# Patient Record
Sex: FEMALE | Race: WHITE | NOT HISPANIC OR LATINO | Employment: OTHER | RURAL
[De-identification: names, ages, dates, MRNs, and addresses within clinical notes are randomized per-mention and may not be internally consistent; named-entity substitution may affect disease eponyms.]

---

## 2020-07-31 ENCOUNTER — HISTORICAL (OUTPATIENT)
Dept: ADMINISTRATIVE | Facility: HOSPITAL | Age: 75
End: 2020-07-31

## 2021-05-13 ENCOUNTER — HOSPITAL ENCOUNTER (OUTPATIENT)
Dept: RADIOLOGY | Facility: HOSPITAL | Age: 76
Discharge: HOME | End: 2021-05-13
Attending: ORTHOPAEDIC SURGERY
Payer: MEDICARE

## 2021-05-13 DIAGNOSIS — M25.561 ACUTE PAIN OF BOTH KNEES: ICD-10-CM

## 2021-05-13 DIAGNOSIS — M25.562 ACUTE PAIN OF BOTH KNEES: ICD-10-CM

## 2021-05-13 PROCEDURE — 73564 X-RAY EXAM KNEE 4 OR MORE: CPT | Mod: TC,50

## 2021-05-20 ENCOUNTER — CLINICAL SUPPORT (OUTPATIENT)
Dept: CARDIOLOGY | Facility: CLINIC | Age: 76
End: 2021-05-20
Payer: MEDICARE

## 2021-05-20 ENCOUNTER — HOSPITAL ENCOUNTER (OUTPATIENT)
Dept: RADIOLOGY | Facility: HOSPITAL | Age: 76
Discharge: HOME | End: 2021-05-20
Attending: ORTHOPAEDIC SURGERY
Payer: MEDICARE

## 2021-05-20 DIAGNOSIS — Z01.810 PRE-OPERATIVE CARDIOVASCULAR EXAMINATION: ICD-10-CM

## 2021-05-20 DIAGNOSIS — Z01.811 PRE-OPERATIVE RESPIRATORY EXAMINATION: ICD-10-CM

## 2021-05-20 PROCEDURE — 71046 X-RAY EXAM CHEST 2 VIEWS: CPT | Mod: 26,,, | Performed by: RADIOLOGY

## 2021-05-20 PROCEDURE — 71046 X-RAY EXAM CHEST 2 VIEWS: CPT | Mod: TC

## 2021-05-20 PROCEDURE — 71046 XR CHEST PA AND LATERAL: ICD-10-PCS | Mod: 26,,, | Performed by: RADIOLOGY

## 2021-05-20 PROCEDURE — 99212 OFFICE O/P EST SF 10 MIN: CPT | Mod: PBBFAC,25

## 2021-05-20 PROCEDURE — 93005 ELECTROCARDIOGRAM TRACING: CPT | Mod: PBBFAC | Performed by: STUDENT IN AN ORGANIZED HEALTH CARE EDUCATION/TRAINING PROGRAM

## 2021-05-20 PROCEDURE — 93010 EKG 12-LEAD: ICD-10-PCS | Mod: S$PBB,,, | Performed by: STUDENT IN AN ORGANIZED HEALTH CARE EDUCATION/TRAINING PROGRAM

## 2021-05-20 PROCEDURE — 93010 ELECTROCARDIOGRAM REPORT: CPT | Mod: S$PBB,,, | Performed by: STUDENT IN AN ORGANIZED HEALTH CARE EDUCATION/TRAINING PROGRAM

## 2021-05-24 ENCOUNTER — ANESTHESIA EVENT (OUTPATIENT)
Dept: SURGERY | Facility: HOSPITAL | Age: 76
End: 2021-05-24
Payer: MEDICARE

## 2021-05-24 ENCOUNTER — ANESTHESIA (OUTPATIENT)
Dept: SURGERY | Facility: HOSPITAL | Age: 76
End: 2021-05-24
Payer: MEDICARE

## 2021-05-24 ENCOUNTER — HOSPITAL ENCOUNTER (OUTPATIENT)
Facility: HOSPITAL | Age: 76
Discharge: HOME | End: 2021-05-24
Attending: ORTHOPAEDIC SURGERY | Admitting: ORTHOPAEDIC SURGERY
Payer: MEDICARE

## 2021-05-24 VITALS
WEIGHT: 188 LBS | HEART RATE: 75 BPM | HEIGHT: 65 IN | RESPIRATION RATE: 16 BRPM | OXYGEN SATURATION: 94 % | TEMPERATURE: 98 F | BODY MASS INDEX: 31.32 KG/M2 | SYSTOLIC BLOOD PRESSURE: 107 MMHG | DIASTOLIC BLOOD PRESSURE: 65 MMHG

## 2021-05-24 DIAGNOSIS — S83.249A ACUTE MEDIAL MENISCAL TEAR: Primary | ICD-10-CM

## 2021-05-24 PROCEDURE — D9220A PRA ANESTHESIA: Mod: ANES,,, | Performed by: ANESTHESIOLOGY

## 2021-05-24 PROCEDURE — 25000003 PHARM REV CODE 250: Performed by: ANESTHESIOLOGY

## 2021-05-24 PROCEDURE — 37000009 HC ANESTHESIA EA ADD 15 MINS: Performed by: ORTHOPAEDIC SURGERY

## 2021-05-24 PROCEDURE — 71000016 HC POSTOP RECOV ADDL HR: Performed by: ORTHOPAEDIC SURGERY

## 2021-05-24 PROCEDURE — 27000260 *HC AIRWAY ORAL: Performed by: ANESTHESIOLOGY

## 2021-05-24 PROCEDURE — 27201423 OPTIME MED/SURG SUP & DEVICES STERILE SUPPLY: Performed by: ORTHOPAEDIC SURGERY

## 2021-05-24 PROCEDURE — 97161 PT EVAL LOW COMPLEX 20 MIN: CPT

## 2021-05-24 PROCEDURE — 71000033 HC RECOVERY, INTIAL HOUR: Performed by: ORTHOPAEDIC SURGERY

## 2021-05-24 PROCEDURE — 71000015 HC POSTOP RECOV 1ST HR: Performed by: ORTHOPAEDIC SURGERY

## 2021-05-24 PROCEDURE — 27000177 HC AIRWAY, LARYNGEAL MASK: Performed by: ANESTHESIOLOGY

## 2021-05-24 PROCEDURE — 63600175 PHARM REV CODE 636 W HCPCS: Performed by: NURSE ANESTHETIST, CERTIFIED REGISTERED

## 2021-05-24 PROCEDURE — 37000008 HC ANESTHESIA 1ST 15 MINUTES: Performed by: ORTHOPAEDIC SURGERY

## 2021-05-24 PROCEDURE — 36000710: Performed by: ORTHOPAEDIC SURGERY

## 2021-05-24 PROCEDURE — 25000003 PHARM REV CODE 250: Performed by: NURSE ANESTHETIST, CERTIFIED REGISTERED

## 2021-05-24 PROCEDURE — 25000003 PHARM REV CODE 250: Performed by: ORTHOPAEDIC SURGERY

## 2021-05-24 PROCEDURE — 27000510 HC BLANKET BAIR HUGGER ANY SIZE: Performed by: ANESTHESIOLOGY

## 2021-05-24 PROCEDURE — 27000655: Performed by: ANESTHESIOLOGY

## 2021-05-24 PROCEDURE — D9220A PRA ANESTHESIA: ICD-10-PCS | Mod: CRNA,,, | Performed by: NURSE ANESTHETIST, CERTIFIED REGISTERED

## 2021-05-24 PROCEDURE — 27100168 OPTIME MED/SURG SUP & DEVICES NON-STERILE SUPPLY: Performed by: ORTHOPAEDIC SURGERY

## 2021-05-24 PROCEDURE — D9220A PRA ANESTHESIA: ICD-10-PCS | Mod: ANES,,, | Performed by: ANESTHESIOLOGY

## 2021-05-24 PROCEDURE — 36000711: Performed by: ORTHOPAEDIC SURGERY

## 2021-05-24 PROCEDURE — D9220A PRA ANESTHESIA: Mod: CRNA,,, | Performed by: NURSE ANESTHETIST, CERTIFIED REGISTERED

## 2021-05-24 PROCEDURE — 27000716 HC OXISENSOR PROBE, ANY SIZE: Performed by: ANESTHESIOLOGY

## 2021-05-24 RX ORDER — LIDOCAINE HYDROCHLORIDE 10 MG/ML
1 INJECTION, SOLUTION EPIDURAL; INFILTRATION; INTRACAUDAL; PERINEURAL ONCE
Status: CANCELLED | OUTPATIENT
Start: 2021-05-24 | End: 2021-05-24

## 2021-05-24 RX ORDER — ONDANSETRON 2 MG/ML
4 INJECTION INTRAMUSCULAR; INTRAVENOUS DAILY PRN
Status: DISCONTINUED | OUTPATIENT
Start: 2021-05-24 | End: 2021-05-24 | Stop reason: HOSPADM

## 2021-05-24 RX ORDER — PROPOFOL 10 MG/ML
VIAL (ML) INTRAVENOUS
Status: DISCONTINUED | OUTPATIENT
Start: 2021-05-24 | End: 2021-05-24

## 2021-05-24 RX ORDER — SODIUM CHLORIDE, SODIUM LACTATE, POTASSIUM CHLORIDE, CALCIUM CHLORIDE 600; 310; 30; 20 MG/100ML; MG/100ML; MG/100ML; MG/100ML
INJECTION, SOLUTION INTRAVENOUS CONTINUOUS
Status: CANCELLED | OUTPATIENT
Start: 2021-05-24

## 2021-05-24 RX ORDER — ONDANSETRON 2 MG/ML
INJECTION INTRAMUSCULAR; INTRAVENOUS
Status: DISCONTINUED | OUTPATIENT
Start: 2021-05-24 | End: 2021-05-24

## 2021-05-24 RX ORDER — MIDAZOLAM HYDROCHLORIDE 1 MG/ML
INJECTION INTRAMUSCULAR; INTRAVENOUS
Status: DISCONTINUED | OUTPATIENT
Start: 2021-05-24 | End: 2021-05-24

## 2021-05-24 RX ORDER — LIDOCAINE HYDROCHLORIDE 20 MG/ML
INJECTION INTRAVENOUS
Status: DISCONTINUED | OUTPATIENT
Start: 2021-05-24 | End: 2021-05-24

## 2021-05-24 RX ORDER — CLINDAMYCIN PHOSPHATE 900 MG/50ML
900 INJECTION, SOLUTION INTRAVENOUS
Status: DISCONTINUED | OUTPATIENT
Start: 2021-05-24 | End: 2021-05-24 | Stop reason: HOSPADM

## 2021-05-24 RX ORDER — EPHEDRINE SULFATE 50 MG/ML
INJECTION, SOLUTION INTRAVENOUS
Status: DISCONTINUED | OUTPATIENT
Start: 2021-05-24 | End: 2021-05-24

## 2021-05-24 RX ORDER — MORPHINE SULFATE 10 MG/ML
4 INJECTION INTRAMUSCULAR; INTRAVENOUS; SUBCUTANEOUS EVERY 5 MIN PRN
Status: DISCONTINUED | OUTPATIENT
Start: 2021-05-24 | End: 2021-05-24 | Stop reason: HOSPADM

## 2021-05-24 RX ORDER — PROPRANOLOL HYDROCHLORIDE 40 MG/1
40 TABLET ORAL DAILY
COMMUNITY
End: 2022-04-11 | Stop reason: DRUGHIGH

## 2021-05-24 RX ORDER — SODIUM CHLORIDE 9 MG/ML
INJECTION, SOLUTION INTRAVENOUS CONTINUOUS
Status: DISCONTINUED | OUTPATIENT
Start: 2021-05-24 | End: 2021-05-24 | Stop reason: HOSPADM

## 2021-05-24 RX ORDER — CLINDAMYCIN PHOSPHATE 900 MG/50ML
INJECTION, SOLUTION INTRAVENOUS
Status: DISCONTINUED | OUTPATIENT
Start: 2021-05-24 | End: 2021-05-24

## 2021-05-24 RX ORDER — FLUOXETINE HYDROCHLORIDE 20 MG/1
20 CAPSULE ORAL DAILY
COMMUNITY
End: 2023-10-10

## 2021-05-24 RX ORDER — FENTANYL CITRATE 50 UG/ML
INJECTION, SOLUTION INTRAMUSCULAR; INTRAVENOUS
Status: DISCONTINUED | OUTPATIENT
Start: 2021-05-24 | End: 2021-05-24

## 2021-05-24 RX ORDER — DEXAMETHASONE SODIUM PHOSPHATE 100 MG/10ML
INJECTION INTRAMUSCULAR; INTRAVENOUS
Status: DISCONTINUED | OUTPATIENT
Start: 2021-05-24 | End: 2021-05-24

## 2021-05-24 RX ORDER — OXYCODONE HYDROCHLORIDE 5 MG/1
5 TABLET ORAL
Status: DISCONTINUED | OUTPATIENT
Start: 2021-05-24 | End: 2021-05-24 | Stop reason: HOSPADM

## 2021-05-24 RX ORDER — HYDROCODONE BITARTRATE AND ACETAMINOPHEN 7.5; 325 MG/1; MG/1
1 TABLET ORAL EVERY 6 HOURS PRN
Status: COMPLETED | OUTPATIENT
Start: 2021-05-24 | End: 2021-05-24

## 2021-05-24 RX ORDER — TRIAMTERENE/HYDROCHLOROTHIAZID 37.5-25 MG
1 TABLET ORAL DAILY
COMMUNITY

## 2021-05-24 RX ORDER — HYDROMORPHONE HYDROCHLORIDE 2 MG/ML
0.5 INJECTION, SOLUTION INTRAMUSCULAR; INTRAVENOUS; SUBCUTANEOUS EVERY 5 MIN PRN
Status: DISCONTINUED | OUTPATIENT
Start: 2021-05-24 | End: 2021-05-24 | Stop reason: HOSPADM

## 2021-05-24 RX ORDER — DIPHENHYDRAMINE HYDROCHLORIDE 50 MG/ML
25 INJECTION INTRAMUSCULAR; INTRAVENOUS EVERY 6 HOURS PRN
Status: DISCONTINUED | OUTPATIENT
Start: 2021-05-24 | End: 2021-05-24 | Stop reason: HOSPADM

## 2021-05-24 RX ORDER — BUPIVACAINE HYDROCHLORIDE 5 MG/ML
INJECTION, SOLUTION EPIDURAL; INTRACAUDAL
Status: DISCONTINUED | OUTPATIENT
Start: 2021-05-24 | End: 2021-05-24 | Stop reason: HOSPADM

## 2021-05-24 RX ORDER — HYDROCODONE BITARTRATE AND ACETAMINOPHEN 5; 325 MG/1; MG/1
1 TABLET ORAL EVERY 4 HOURS PRN
Qty: 20 TABLET | Refills: 0 | Status: SHIPPED | OUTPATIENT
Start: 2021-05-24 | End: 2022-06-29 | Stop reason: ALTCHOICE

## 2021-05-24 RX ORDER — MEPERIDINE HYDROCHLORIDE 25 MG/ML
25 INJECTION INTRAMUSCULAR; INTRAVENOUS; SUBCUTANEOUS EVERY 10 MIN PRN
Status: DISCONTINUED | OUTPATIENT
Start: 2021-05-24 | End: 2021-05-24 | Stop reason: HOSPADM

## 2021-05-24 RX ORDER — ONDANSETRON 2 MG/ML
4 INJECTION INTRAMUSCULAR; INTRAVENOUS EVERY 8 HOURS PRN
Status: DISCONTINUED | OUTPATIENT
Start: 2021-05-24 | End: 2021-05-24 | Stop reason: HOSPADM

## 2021-05-24 RX ADMIN — CLINDAMYCIN IN 5 PERCENT DEXTROSE 900 MG: 18 INJECTION, SOLUTION INTRAVENOUS at 11:05

## 2021-05-24 RX ADMIN — MIDAZOLAM HYDROCHLORIDE 2 MG: 1 INJECTION, SOLUTION INTRAMUSCULAR; INTRAVENOUS at 10:05

## 2021-05-24 RX ADMIN — PROPOFOL 100 MG: 10 INJECTION, EMULSION INTRAVENOUS at 10:05

## 2021-05-24 RX ADMIN — PROPOFOL 50 MG: 10 INJECTION, EMULSION INTRAVENOUS at 11:05

## 2021-05-24 RX ADMIN — LIDOCAINE HYDROCHLORIDE 75 MG: 20 INJECTION, SOLUTION INTRAVENOUS at 10:05

## 2021-05-24 RX ADMIN — SODIUM CHLORIDE: 9 INJECTION, SOLUTION INTRAVENOUS at 08:05

## 2021-05-24 RX ADMIN — HYDROCODONE BITARTRATE AND ACETAMINOPHEN 1 TABLET: 7.5; 325 TABLET ORAL at 01:05

## 2021-05-24 RX ADMIN — ONDANSETRON 4 MG: 2 INJECTION INTRAMUSCULAR; INTRAVENOUS at 11:05

## 2021-05-24 RX ADMIN — FENTANYL CITRATE 50 MCG: 50 INJECTION INTRAMUSCULAR; INTRAVENOUS at 11:05

## 2021-05-24 RX ADMIN — DEXAMETHASONE SODIUM PHOSPHATE 8 MG: 10 INJECTION INTRAMUSCULAR; INTRAVENOUS at 11:05

## 2021-05-24 RX ADMIN — EPHEDRINE SULFATE 25 MG: 50 INJECTION INTRAVENOUS at 11:05

## 2021-06-08 PROBLEM — Z98.890 S/P RIGHT KNEE ARTHROSCOPY: Status: ACTIVE | Noted: 2021-06-08

## 2021-06-08 PROBLEM — M17.0 ARTHRITIS OF BOTH KNEES: Status: ACTIVE | Noted: 2021-06-08

## 2021-06-08 PROBLEM — S83.249A ACUTE MEDIAL MENISCAL TEAR: Status: RESOLVED | Noted: 2021-05-24 | Resolved: 2021-06-08

## 2021-06-14 ENCOUNTER — HOSPITAL ENCOUNTER (EMERGENCY)
Facility: HOSPITAL | Age: 76
Discharge: HOME | End: 2021-06-14
Attending: EMERGENCY MEDICINE
Payer: MEDICARE

## 2021-06-14 VITALS
SYSTOLIC BLOOD PRESSURE: 133 MMHG | WEIGHT: 190 LBS | HEIGHT: 65 IN | DIASTOLIC BLOOD PRESSURE: 78 MMHG | OXYGEN SATURATION: 96 % | TEMPERATURE: 99 F | HEART RATE: 81 BPM | RESPIRATION RATE: 20 BRPM | BODY MASS INDEX: 31.65 KG/M2

## 2021-06-14 DIAGNOSIS — Z98.890 S/P RIGHT KNEE ARTHROSCOPY: ICD-10-CM

## 2021-06-14 DIAGNOSIS — M54.9 BACK PAIN, UNSPECIFIED BACK LOCATION, UNSPECIFIED BACK PAIN LATERALITY, UNSPECIFIED CHRONICITY: Primary | ICD-10-CM

## 2021-06-14 DIAGNOSIS — M17.0 ARTHRITIS OF BOTH KNEES: ICD-10-CM

## 2021-06-14 PROCEDURE — 96372 THER/PROPH/DIAG INJ SC/IM: CPT

## 2021-06-14 PROCEDURE — 63600175 PHARM REV CODE 636 W HCPCS: Performed by: NURSE PRACTITIONER

## 2021-06-14 PROCEDURE — 99283 EMERGENCY DEPT VISIT LOW MDM: CPT | Mod: ,,, | Performed by: NURSE PRACTITIONER

## 2021-06-14 PROCEDURE — 99284 EMERGENCY DEPT VISIT MOD MDM: CPT | Mod: 25

## 2021-06-14 PROCEDURE — 99283 PR EMERGENCY DEPT VISIT,LEVEL III: ICD-10-PCS | Mod: ,,, | Performed by: NURSE PRACTITIONER

## 2021-06-14 RX ORDER — ORPHENADRINE CITRATE 30 MG/ML
60 INJECTION INTRAMUSCULAR; INTRAVENOUS
Status: COMPLETED | OUTPATIENT
Start: 2021-06-14 | End: 2021-06-14

## 2021-06-14 RX ORDER — PREDNISONE 20 MG/1
60 TABLET ORAL DAILY
Qty: 10 TABLET | Refills: 0 | Status: SHIPPED | OUTPATIENT
Start: 2021-06-14 | End: 2021-06-17

## 2021-06-14 RX ORDER — CYCLOBENZAPRINE HCL 10 MG
10 TABLET ORAL 3 TIMES DAILY PRN
Qty: 15 TABLET | Refills: 0 | Status: SHIPPED | OUTPATIENT
Start: 2021-06-14 | End: 2021-06-19

## 2021-06-14 RX ORDER — NAPROXEN 375 MG/1
375 TABLET ORAL 2 TIMES DAILY WITH MEALS
Qty: 14 TABLET | Refills: 0 | Status: SHIPPED | OUTPATIENT
Start: 2021-06-14 | End: 2021-06-22 | Stop reason: SDUPTHER

## 2021-06-14 RX ADMIN — ORPHENADRINE CITRATE 60 MG: 60 INJECTION INTRAMUSCULAR; INTRAVENOUS at 09:06

## 2021-06-22 ENCOUNTER — OFFICE VISIT (OUTPATIENT)
Dept: SPINE | Facility: CLINIC | Age: 76
End: 2021-06-22
Payer: MEDICARE

## 2021-06-22 ENCOUNTER — HOSPITAL ENCOUNTER (OUTPATIENT)
Dept: RADIOLOGY | Facility: HOSPITAL | Age: 76
Discharge: HOME | End: 2021-06-22
Attending: NURSE PRACTITIONER
Payer: MEDICARE

## 2021-06-22 VITALS — BODY MASS INDEX: 30.99 KG/M2 | HEIGHT: 65 IN | WEIGHT: 186 LBS

## 2021-06-22 DIAGNOSIS — M54.9 DORSALGIA, UNSPECIFIED: ICD-10-CM

## 2021-06-22 DIAGNOSIS — M54.9 BACK PAIN, UNSPECIFIED BACK LOCATION, UNSPECIFIED BACK PAIN LATERALITY, UNSPECIFIED CHRONICITY: ICD-10-CM

## 2021-06-22 PROCEDURE — 72110 XR LUMBAR SPINE 5 VIEW WITH FLEX AND EXT: ICD-10-PCS | Mod: 26,,, | Performed by: RADIOLOGY

## 2021-06-22 PROCEDURE — 99204 PR OFFICE/OUTPT VISIT, NEW, LEVL IV, 45-59 MIN: ICD-10-PCS | Mod: S$PBB,,, | Performed by: NURSE PRACTITIONER

## 2021-06-22 PROCEDURE — 99204 OFFICE O/P NEW MOD 45 MIN: CPT | Mod: S$PBB,,, | Performed by: NURSE PRACTITIONER

## 2021-06-22 PROCEDURE — 72110 X-RAY EXAM L-2 SPINE 4/>VWS: CPT | Mod: TC

## 2021-06-22 PROCEDURE — 99213 OFFICE O/P EST LOW 20 MIN: CPT | Mod: PBBFAC,25 | Performed by: NURSE PRACTITIONER

## 2021-06-22 PROCEDURE — 72110 X-RAY EXAM L-2 SPINE 4/>VWS: CPT | Mod: 26,,, | Performed by: RADIOLOGY

## 2021-06-22 RX ORDER — CYCLOBENZAPRINE HCL 5 MG
5 TABLET ORAL 3 TIMES DAILY PRN
Qty: 45 TABLET | Refills: 2 | Status: SHIPPED | OUTPATIENT
Start: 2021-06-22 | End: 2021-07-02

## 2021-06-22 RX ORDER — NAPROXEN 375 MG/1
375 TABLET ORAL 2 TIMES DAILY WITH MEALS
Qty: 14 TABLET | Refills: 0 | Status: SHIPPED | OUTPATIENT
Start: 2021-06-22 | End: 2021-07-06

## 2021-08-09 ENCOUNTER — HOSPITAL ENCOUNTER (OUTPATIENT)
Dept: RADIOLOGY | Facility: HOSPITAL | Age: 76
Discharge: HOME | End: 2021-08-09
Payer: MEDICARE

## 2021-08-09 DIAGNOSIS — Z12.31 VISIT FOR SCREENING MAMMOGRAM: ICD-10-CM

## 2021-08-09 PROCEDURE — 77067 SCR MAMMO BI INCL CAD: CPT | Mod: TC

## 2021-08-19 DIAGNOSIS — Z86.010 HISTORY OF COLON POLYPS: Primary | ICD-10-CM

## 2021-09-30 DIAGNOSIS — Z01.818 PRE-OP TESTING: ICD-10-CM

## 2021-10-04 ENCOUNTER — ANESTHESIA EVENT (OUTPATIENT)
Dept: GASTROENTEROLOGY | Facility: HOSPITAL | Age: 76
End: 2021-10-04
Payer: MEDICARE

## 2021-10-04 ENCOUNTER — HOSPITAL ENCOUNTER (OUTPATIENT)
Dept: GASTROENTEROLOGY | Facility: HOSPITAL | Age: 76
Discharge: HOME | End: 2021-10-04
Attending: INTERNAL MEDICINE
Payer: MEDICARE

## 2021-10-04 ENCOUNTER — ANESTHESIA (OUTPATIENT)
Dept: GASTROENTEROLOGY | Facility: HOSPITAL | Age: 76
End: 2021-10-04
Payer: MEDICARE

## 2021-10-04 VITALS
SYSTOLIC BLOOD PRESSURE: 126 MMHG | HEIGHT: 65 IN | OXYGEN SATURATION: 95 % | HEART RATE: 65 BPM | DIASTOLIC BLOOD PRESSURE: 64 MMHG | BODY MASS INDEX: 30.66 KG/M2 | WEIGHT: 184 LBS | TEMPERATURE: 98 F | RESPIRATION RATE: 15 BRPM

## 2021-10-04 DIAGNOSIS — K57.30 DIVERTICULA, COLON: ICD-10-CM

## 2021-10-04 DIAGNOSIS — Z12.11 SCREENING FOR COLON CANCER: ICD-10-CM

## 2021-10-04 DIAGNOSIS — Z86.010 HISTORY OF COLON POLYPS: ICD-10-CM

## 2021-10-04 PROBLEM — Z86.0100 HISTORY OF COLON POLYPS: Status: ACTIVE | Noted: 2021-10-04

## 2021-10-04 PROCEDURE — 25000003 PHARM REV CODE 250: Performed by: NURSE ANESTHETIST, CERTIFIED REGISTERED

## 2021-10-04 PROCEDURE — D9220A PRA ANESTHESIA: Mod: ,,, | Performed by: NURSE ANESTHETIST, CERTIFIED REGISTERED

## 2021-10-04 PROCEDURE — 37000008 HC ANESTHESIA 1ST 15 MINUTES

## 2021-10-04 PROCEDURE — G0105 COLORECTAL SCRN; HI RISK IND: HCPCS

## 2021-10-04 PROCEDURE — 63600175 PHARM REV CODE 636 W HCPCS: Performed by: NURSE ANESTHETIST, CERTIFIED REGISTERED

## 2021-10-04 PROCEDURE — 37000009 HC ANESTHESIA EA ADD 15 MINS

## 2021-10-04 PROCEDURE — 27201423 OPTIME MED/SURG SUP & DEVICES STERILE SUPPLY

## 2021-10-04 PROCEDURE — D9220A PRA ANESTHESIA: ICD-10-PCS | Mod: ,,, | Performed by: NURSE ANESTHETIST, CERTIFIED REGISTERED

## 2021-10-04 RX ORDER — PHENYLEPHRINE HYDROCHLORIDE 10 MG/ML
INJECTION INTRAVENOUS
Status: DISCONTINUED | OUTPATIENT
Start: 2021-10-04 | End: 2021-10-04

## 2021-10-04 RX ORDER — SODIUM CHLORIDE 0.9 % (FLUSH) 0.9 %
10 SYRINGE (ML) INJECTION
Status: DISCONTINUED | OUTPATIENT
Start: 2021-10-04 | End: 2021-10-05 | Stop reason: HOSPADM

## 2021-10-04 RX ORDER — LIDOCAINE HYDROCHLORIDE 20 MG/ML
INJECTION, SOLUTION EPIDURAL; INFILTRATION; INTRACAUDAL; PERINEURAL
Status: DISCONTINUED | OUTPATIENT
Start: 2021-10-04 | End: 2021-10-04

## 2021-10-04 RX ORDER — PROPOFOL 10 MG/ML
VIAL (ML) INTRAVENOUS
Status: DISCONTINUED | OUTPATIENT
Start: 2021-10-04 | End: 2021-10-04

## 2021-10-04 RX ADMIN — PROPOFOL 50 MG: 10 INJECTION, EMULSION INTRAVENOUS at 09:10

## 2021-10-04 RX ADMIN — PHENYLEPHRINE HYDROCHLORIDE 200 MCG: 10 INJECTION INTRAVENOUS at 09:10

## 2021-10-04 RX ADMIN — LIDOCAINE HYDROCHLORIDE 100 MG: 20 INJECTION, SOLUTION INTRAVENOUS at 09:10

## 2021-10-04 RX ADMIN — PROPOFOL 100 MG: 10 INJECTION, EMULSION INTRAVENOUS at 09:10

## 2021-10-04 RX ADMIN — SODIUM CHLORIDE: 9 INJECTION, SOLUTION INTRAVENOUS at 09:10

## 2021-12-07 ENCOUNTER — TELEPHONE (OUTPATIENT)
Dept: GASTROENTEROLOGY | Facility: CLINIC | Age: 76
End: 2021-12-07
Payer: MEDICARE

## 2021-12-08 ENCOUNTER — TELEPHONE (OUTPATIENT)
Dept: GASTROENTEROLOGY | Facility: CLINIC | Age: 76
End: 2021-12-08
Payer: MEDICARE

## 2021-12-21 ENCOUNTER — OFFICE VISIT (OUTPATIENT)
Dept: GASTROENTEROLOGY | Facility: CLINIC | Age: 76
End: 2021-12-21
Payer: MEDICARE

## 2021-12-21 VITALS
DIASTOLIC BLOOD PRESSURE: 84 MMHG | OXYGEN SATURATION: 96 % | HEIGHT: 65 IN | WEIGHT: 189 LBS | HEART RATE: 62 BPM | BODY MASS INDEX: 31.49 KG/M2 | SYSTOLIC BLOOD PRESSURE: 131 MMHG

## 2021-12-21 DIAGNOSIS — R19.7 DIARRHEA, UNSPECIFIED TYPE: Primary | ICD-10-CM

## 2021-12-21 DIAGNOSIS — R10.31 RIGHT LOWER QUADRANT PAIN: ICD-10-CM

## 2021-12-21 DIAGNOSIS — K57.30 DIVERTICULA, COLON: ICD-10-CM

## 2021-12-21 PROCEDURE — 99214 PR OFFICE/OUTPT VISIT, EST, LEVL IV, 30-39 MIN: ICD-10-PCS | Mod: ,,, | Performed by: NURSE PRACTITIONER

## 2021-12-21 PROCEDURE — 99214 OFFICE O/P EST MOD 30 MIN: CPT | Mod: ,,, | Performed by: NURSE PRACTITIONER

## 2021-12-28 ENCOUNTER — TELEPHONE (OUTPATIENT)
Dept: GASTROENTEROLOGY | Facility: CLINIC | Age: 76
End: 2021-12-28
Payer: MEDICARE

## 2021-12-28 LAB
C COLI+JEJ+UPSA DNA STL QL NAA+NON-PROBE: POSITIVE
E COLI SXT1 STL QL IA: NEGATIVE
E COLI SXT2 STL QL IA: NEGATIVE
FATTY ACIDS: NORMAL /HPF
FECAL LEUKOCYTES: NORMAL /HPF
GIARDIA ANTIGEN: NEGATIVE
NEUTRAL FATS: NORMAL /HPF
NOROVIRUS GI+II RNA STL QL NAA+NON-PROBE: NEGATIVE
OCCULT BLOOD: NEGATIVE
RVA RNA STL QL NAA+NON-PROBE: NEGATIVE
S ENT+BONG DNA STL QL NAA+NON-PROBE: NEGATIVE
SHIGELLA SPECIES NAT: NEGATIVE
V CHOL+PARA+VUL DNA STL QL NAA+NON-PROBE: NEGATIVE
Y ENTEROCOL DNA STL QL NAA+NON-PROBE: NEGATIVE

## 2021-12-28 PROCEDURE — 82272 OCCULT BLD FECES 1-3 TESTS: CPT | Mod: ,,, | Performed by: CLINICAL MEDICAL LABORATORY

## 2021-12-28 PROCEDURE — 89055 LEUKOCYTE ASSESSMENT FECAL: CPT | Mod: ,,, | Performed by: CLINICAL MEDICAL LABORATORY

## 2021-12-28 PROCEDURE — 89055 FECAL LEUKOCYTES: ICD-10-PCS | Mod: ,,, | Performed by: CLINICAL MEDICAL LABORATORY

## 2021-12-28 PROCEDURE — 87329 GIARDIA AG IA: CPT | Mod: 59,,, | Performed by: CLINICAL MEDICAL LABORATORY

## 2021-12-28 PROCEDURE — 82272 CHG BLOOD OCCULT,BY PEROXID, FECES, 1-3 SIMULT,  2N CA SCREEN: ICD-10-PCS | Mod: ,,, | Performed by: CLINICAL MEDICAL LABORATORY

## 2021-12-28 PROCEDURE — 87506 IADNA-DNA/RNA PROBE TQ 6-11: CPT | Mod: ,,, | Performed by: CLINICAL MEDICAL LABORATORY

## 2021-12-28 PROCEDURE — 82705 FATS/LIPIDS FECES QUAL: CPT | Mod: ,,, | Performed by: CLINICAL MEDICAL LABORATORY

## 2021-12-28 PROCEDURE — 87329 GIARDIA ANTIGEN: ICD-10-PCS | Mod: 59,,, | Performed by: CLINICAL MEDICAL LABORATORY

## 2021-12-28 PROCEDURE — 82705 FECAL FAT, QUALITATIVE: ICD-10-PCS | Mod: ,,, | Performed by: CLINICAL MEDICAL LABORATORY

## 2021-12-28 PROCEDURE — 82271 OCCULT BLOOD OTHER SOURCES: CPT | Mod: ,,, | Performed by: CLINICAL MEDICAL LABORATORY

## 2021-12-28 PROCEDURE — 87506 ENTERIC PATHOGEN PANEL: ICD-10-PCS | Mod: ,,, | Performed by: CLINICAL MEDICAL LABORATORY

## 2021-12-28 PROCEDURE — 82271 OCCULT BLOOD X 1, STOOL: ICD-10-PCS | Mod: ,,, | Performed by: CLINICAL MEDICAL LABORATORY

## 2022-01-03 ENCOUNTER — TELEPHONE (OUTPATIENT)
Dept: GASTROENTEROLOGY | Facility: CLINIC | Age: 77
End: 2022-01-03
Payer: MEDICARE

## 2022-01-03 ENCOUNTER — HOSPITAL ENCOUNTER (OUTPATIENT)
Dept: RADIOLOGY | Facility: HOSPITAL | Age: 77
Discharge: HOME | End: 2022-01-03
Attending: NURSE PRACTITIONER
Payer: MEDICARE

## 2022-01-03 DIAGNOSIS — R10.31 RIGHT LOWER QUADRANT PAIN: ICD-10-CM

## 2022-01-03 PROCEDURE — 25500020 PHARM REV CODE 255: Performed by: NURSE PRACTITIONER

## 2022-01-03 PROCEDURE — 74177 CT ABD & PELVIS W/CONTRAST: CPT | Mod: TC

## 2022-01-03 PROCEDURE — 74177 CT ABD & PELVIS W/CONTRAST: CPT | Mod: 26,,, | Performed by: STUDENT IN AN ORGANIZED HEALTH CARE EDUCATION/TRAINING PROGRAM

## 2022-01-03 PROCEDURE — 74177 CT ABDOMEN PELVIS WITH CONTRAST: ICD-10-PCS | Mod: 26,,, | Performed by: STUDENT IN AN ORGANIZED HEALTH CARE EDUCATION/TRAINING PROGRAM

## 2022-01-03 RX ADMIN — IOPAMIDOL 100 ML: 755 INJECTION, SOLUTION INTRAVENOUS at 08:01

## 2022-01-03 NOTE — TELEPHONE ENCOUNTER
Called CT scan results. Patient verbalized understanding.      ----- Message from ZANA Redmond sent at 1/3/2022  4:02 PM CST -----  No acute process. Does have fatty liver.

## 2022-01-18 ENCOUNTER — OFFICE VISIT (OUTPATIENT)
Dept: GASTROENTEROLOGY | Facility: CLINIC | Age: 77
End: 2022-01-18
Payer: MEDICARE

## 2022-01-18 VITALS
RESPIRATION RATE: 12 BRPM | WEIGHT: 191.63 LBS | OXYGEN SATURATION: 95 % | HEART RATE: 66 BPM | BODY MASS INDEX: 31.93 KG/M2 | DIASTOLIC BLOOD PRESSURE: 81 MMHG | HEIGHT: 65 IN | SYSTOLIC BLOOD PRESSURE: 120 MMHG

## 2022-01-18 DIAGNOSIS — R13.10 DYSPHAGIA, UNSPECIFIED TYPE: ICD-10-CM

## 2022-01-18 DIAGNOSIS — Z11.59 SCREENING FOR VIRAL DISEASE: ICD-10-CM

## 2022-01-18 DIAGNOSIS — K76.0 FATTY LIVER: ICD-10-CM

## 2022-01-18 DIAGNOSIS — R10.13 EPIGASTRIC PAIN: Primary | ICD-10-CM

## 2022-01-18 DIAGNOSIS — A09 DIARRHEA OF INFECTIOUS ORIGIN: ICD-10-CM

## 2022-01-18 PROCEDURE — 99214 PR OFFICE/OUTPT VISIT, EST, LEVL IV, 30-39 MIN: ICD-10-PCS | Mod: ,,, | Performed by: NURSE PRACTITIONER

## 2022-01-18 PROCEDURE — 99214 OFFICE O/P EST MOD 30 MIN: CPT | Mod: ,,, | Performed by: NURSE PRACTITIONER

## 2022-01-18 RX ORDER — LANSOPRAZOLE 30 MG/1
30 CAPSULE, DELAYED RELEASE ORAL DAILY
Qty: 30 CAPSULE | Refills: 2 | Status: SHIPPED | OUTPATIENT
Start: 2022-01-18 | End: 2022-04-13 | Stop reason: SDUPTHER

## 2022-01-18 RX ORDER — ALENDRONATE SODIUM 70 MG/1
70 TABLET ORAL
COMMUNITY

## 2022-01-18 NOTE — PROGRESS NOTES
Kailee Malone is a 76 y.o. female here for No chief complaint on file.        PCP: Kenny Valenzuela III  Referring Provider: No referring provider defined for this encounter.     HPI:  Presents for follow up diarrhea. Stool studies were completed and she did have campylobacter that resolved without treatment. Diarrhea is resolved. Today she is reports epigastric discomfort worse after eating. No nausea or vomiting. She describes a gnawing or empty discomfort within 1-2 hours after eating. She does have dysphagia. Worse with solids. No hematochezia or melena. She is also taking Naproxen and Fosamax. CT abdomen and pelvis 12/21/21 no acute process. Colonoscopy 10/4/21, diverticulosis. She does have fatty liver. Also is reporting vaginal odor. No pelvic exam or gyn evaluation in many years. No dysuria.        ROS:  Review of Systems   Constitutional: Negative for appetite change, fatigue, fever and unexpected weight change.   HENT: Positive for trouble swallowing.    Respiratory: Negative for shortness of breath and wheezing.    Cardiovascular: Negative for chest pain.   Gastrointestinal: Positive for abdominal pain. Negative for blood in stool, change in bowel habit, constipation, diarrhea (resolved), nausea, vomiting, reflux and change in bowel habit. Anal bleeding: epigastric.   Genitourinary: Negative for dysuria, frequency, urgency, vaginal discharge (vaginal odor) and vaginal pain.   Musculoskeletal: Negative for back pain, gait problem and joint swelling.   Integumentary:  Negative for pallor and rash.   Allergic/Immunologic: Negative for food allergies.   Neurological: Negative for dizziness, weakness and light-headedness.   Hematological: Does not bruise/bleed easily.   Psychiatric/Behavioral: The patient is not nervous/anxious.           PMHX:  has a past medical history of Diverticula, colon (10/4/2021), History of colon polyps (10/4/2021), Hypertension, and Screening for colon cancer  "(10/4/2021).    PSHX:  has a past surgical history that includes RT WRIST; Hysterectomy; Breast surgery; Arthroscopy of knee (Right, 5/24/2021); Knee arthroscopy w/ meniscectomy (Right, 5/24/2021); and Breast biopsy.    PFHX: family history includes Breast cancer in her maternal aunt and mother.    PSlHX:  reports that she has never smoked. She has never used smokeless tobacco. She reports previous alcohol use. She reports previous drug use.        Review of patient's allergies indicates:   Allergen Reactions    Betadine prepstick [povidone-iodine-ethyl alcohol]     Penicillins        Medication List with Changes/Refills   Current Medications    ALENDRONATE (FOSAMAX) 70 MG TABLET    Take 70 mg by mouth every 7 days.    FLUOXETINE 20 MG CAPSULE    Take 20 mg by mouth once daily.    HYDROCODONE-ACETAMINOPHEN (NORCO) 5-325 MG PER TABLET    Take 1 tablet by mouth every 4 (four) hours as needed for Pain.    KETOCONAZOLE (NIZORAL) 2 % CREAM    APPLY TO AFFECTED AREAS TWICE DAILY UNTIL CLEAR    NAPROXEN (NAPROSYN) 500 MG TABLET    Take 1 tablet (500 mg total) by mouth 2 (two) times daily with meals.    PROPRANOLOL (INDERAL) 40 MG TABLET    Take 40 mg by mouth once daily.    TRIAMTERENE-HYDROCHLOROTHIAZIDE 37.5-25 MG (MAXZIDE-25) 37.5-25 MG PER TABLET    Take 1 tablet by mouth once daily.        Objective Findings:  Vital Signs:  /81   Pulse 66   Resp 12   Ht 5' 5" (1.651 m)   Wt 86.9 kg (191 lb 9.6 oz)   SpO2 95%   BMI 31.88 kg/m²  Body mass index is 31.88 kg/m².    Physical Exam:  Physical Exam  Vitals and nursing note reviewed.   Constitutional:       General: She is not in acute distress.     Appearance: Normal appearance.   HENT:      Mouth/Throat:      Mouth: Mucous membranes are moist.   Eyes:      General: No scleral icterus.  Cardiovascular:      Rate and Rhythm: Normal rate and regular rhythm.      Heart sounds: No murmur heard.      Pulmonary:      Breath sounds: No wheezing, rhonchi or rales. "   Abdominal:      General: Bowel sounds are normal. There is no distension.      Palpations: Abdomen is soft. There is no mass.      Tenderness: There is no abdominal tenderness. There is no guarding or rebound.      Hernia: No hernia is present.   Musculoskeletal:      Right lower leg: No edema.      Left lower leg: No edema.   Skin:     General: Skin is warm and dry.      Coloration: Skin is not jaundiced or pale.      Findings: No bruising or rash.   Neurological:      Mental Status: She is alert and oriented to person, place, and time.   Psychiatric:         Mood and Affect: Mood normal.          Labs:  Lab Results   Component Value Date    WBC 11.67 (H) 12/21/2021    HGB 12.3 12/21/2021    HCT 37.9 (L) 12/21/2021    MCV 93.1 12/21/2021    RDW 13.2 12/21/2021     12/21/2021    LYMPH 14.6 (L) 12/21/2021    LYMPH 1.70 12/21/2021    MONO 10.2 (H) 12/21/2021    EOS 0.22 12/21/2021    BASO 0.05 12/21/2021     Lab Results   Component Value Date     (L) 12/21/2021    K 4.4 12/21/2021     12/21/2021    CO2 27 12/21/2021    GLU 83 12/21/2021    BUN 19 (H) 12/21/2021    CREATININE 0.82 12/21/2021    CALCIUM 9.0 12/21/2021    PROT 7.5 12/21/2021    ALBUMIN 3.9 12/21/2021    BILITOT 0.4 12/21/2021    ALKPHOS 30 (L) 12/21/2021    AST 20 12/21/2021    ALT 33 12/21/2021         Imaging: CT Abdomen Pelvis With Contrast    Result Date: 1/3/2022  EXAMINATION: CT ABDOMEN PELVIS WITH CONTRAST CLINICAL HISTORY: RLQ abdominal pain (Age >= 14y); COMPARISON: None. TECHNIQUE: CT ABDOMEN PELVIS WITH CONTRAST FINDINGS: Lower lobes: Clear. Cardiac: No effusion. Abdomen: Hepatobiliary/gallbladder: Diffuse hypoattenuation of the liver Spleen: Normal Pancreas: Normal Adrenal/Genitourinary system: Normal Bowel and Mesentery: There is no evidence for bowel obstruction. Peritoneum: Normal. Retroperitoneum: No enlarged lymph nodes. Vasculature: Normal. Lymph nodes: No enlarged lymph nodes. Abdominal wall: Normal. Osseous  structures: Normal.     1. No evidence to suggest acute pathology within the abdomen or pelvis. Probable diffuse hepatic steatosis Electronically signed by: Jake Laureano Date:    01/03/2022 Time:    09:22        Assessment:  Kailee Malone is a 76 y.o. female here with:  1. Epigastric pain    2. Dysphagia, unspecified type    3. Fatty liver    4. Screening for viral disease    5. Diarrhea of infectious origin          Recommendations:  1.  Prevacid 30 mg daily (Unable to use Prilosec or Protonix due to betadine allergy  2. Stop Naproxen if possible. Do not take other NSAID's  3. Do not lay down after taking Fosamax  4. Avoid spicy, and greasy foods. Do not lay down within 3 hours of eating  5. Schedule EGD/Dilation  6. Liver ultrasound in 6 months with  CBC, CMP  7. Keep appointment with ARDEN Davalos as scheduled for vaginal complaint    Follow up in about 6 months (around 7/18/2022).      Order summary:  Orders Placed This Encounter    US Abdomen Limited    COVID-19 Routine Screening    EGD w Dilation       Thank you for allowing me to participate in the care of Kailee Malone.      ABDIRAHMAN Mercado

## 2022-01-18 NOTE — PATIENT INSTRUCTIONS
Scheduled for U/S Abdomen on Jan 27th at 9 am. NPO after midnight. Pt verbalized good understanding.

## 2022-01-21 ENCOUNTER — OFFICE VISIT (OUTPATIENT)
Dept: OBSTETRICS AND GYNECOLOGY | Facility: CLINIC | Age: 77
End: 2022-01-21
Payer: MEDICARE

## 2022-01-21 VITALS
HEIGHT: 65 IN | HEART RATE: 68 BPM | WEIGHT: 189.63 LBS | SYSTOLIC BLOOD PRESSURE: 135 MMHG | DIASTOLIC BLOOD PRESSURE: 77 MMHG | TEMPERATURE: 96 F | OXYGEN SATURATION: 99 % | BODY MASS INDEX: 31.59 KG/M2 | RESPIRATION RATE: 17 BRPM

## 2022-01-21 DIAGNOSIS — N95.2 VAGINAL ATROPHY: ICD-10-CM

## 2022-01-21 DIAGNOSIS — R30.0 DYSURIA: Primary | ICD-10-CM

## 2022-01-21 DIAGNOSIS — R82.90 ABNORMAL URINE: ICD-10-CM

## 2022-01-21 DIAGNOSIS — Z78.0 POSTMENOPAUSAL: ICD-10-CM

## 2022-01-21 LAB
BILIRUB SERPL-MCNC: ABNORMAL MG/DL
BLOOD URINE, POC: ABNORMAL
COLOR, POC UA: YELLOW
GLUCOSE UR QL STRIP: ABNORMAL
KETONES UR QL STRIP: ABNORMAL
LEUKOCYTE ESTERASE URINE, POC: ABNORMAL
NITRITE, POC UA: ABNORMAL
PH, POC UA: 7
PROTEIN, POC: ABNORMAL
SPECIFIC GRAVITY, POC UA: 1.02
UROBILINOGEN, POC UA: 0.2

## 2022-01-21 PROCEDURE — 87086 URINE CULTURE/COLONY COUNT: CPT | Mod: ,,, | Performed by: CLINICAL MEDICAL LABORATORY

## 2022-01-21 PROCEDURE — 81003 URINALYSIS AUTO W/O SCOPE: CPT | Mod: RHCUB | Performed by: ADVANCED PRACTICE MIDWIFE

## 2022-01-21 PROCEDURE — 99203 OFFICE O/P NEW LOW 30 MIN: CPT | Mod: ,,, | Performed by: ADVANCED PRACTICE MIDWIFE

## 2022-01-21 PROCEDURE — 87186 CULTURE, URINE: ICD-10-PCS | Mod: ,,, | Performed by: CLINICAL MEDICAL LABORATORY

## 2022-01-21 PROCEDURE — 87077 CULTURE, URINE: ICD-10-PCS | Mod: ,,, | Performed by: CLINICAL MEDICAL LABORATORY

## 2022-01-21 PROCEDURE — 87086 CULTURE, URINE: ICD-10-PCS | Mod: ,,, | Performed by: CLINICAL MEDICAL LABORATORY

## 2022-01-21 PROCEDURE — 99203 PR OFFICE/OUTPT VISIT, NEW, LEVL III, 30-44 MIN: ICD-10-PCS | Mod: ,,, | Performed by: ADVANCED PRACTICE MIDWIFE

## 2022-01-21 PROCEDURE — 87077 CULTURE AEROBIC IDENTIFY: CPT | Mod: ,,, | Performed by: CLINICAL MEDICAL LABORATORY

## 2022-01-21 PROCEDURE — 87186 SC STD MICRODIL/AGAR DIL: CPT | Mod: ,,, | Performed by: CLINICAL MEDICAL LABORATORY

## 2022-01-21 RX ORDER — ESTRADIOL 0.1 MG/G
1 CREAM VAGINAL DAILY
Qty: 42.5 G | Refills: 1 | Status: SHIPPED | OUTPATIENT
Start: 2022-01-21 | End: 2022-11-15 | Stop reason: SDUPTHER

## 2022-01-21 RX ORDER — SULFAMETHOXAZOLE AND TRIMETHOPRIM 400; 80 MG/1; MG/1
1 TABLET ORAL DAILY
Qty: 10 TABLET | Refills: 0 | Status: SHIPPED | OUTPATIENT
Start: 2022-01-21 | End: 2022-01-31

## 2022-01-21 NOTE — PATIENT INSTRUCTIONS
Patient Education       Urinary Tract Infection Discharge Instructions, Adult   About this topic   A urinary tract infection is a UTI. It is caused by germs getting into the urinary tract. The urinary tract is made up of the kidneys, ureters, bladder, and urethra. The urethra is a tube at the bottom of the bladder. Urine flows out of this tube. The germs enter the urethra and then spread in the bladder. The ureters are small tubes that join the bladder and the kidneys. Most UTIs are infections in either your bladder or your kidneys. Bladder infections are more common and may also be called cystitis. Kidney infections are more serious and may also be called pyelonephritis.         What care is needed at home?   · Ask your doctor what you need to do when you go home. Make sure you ask questions if you do not understand what the doctor says. This way you will know what you need to do.  · Take your drugs as ordered by your doctor.  · Unless your doctor has told you otherwise, drink at least 8 to 10 glasses of water or water-based drinks each day. Do not include drinks with caffeine, like coffee or tea.  · Do not hold back your urine. Go to the bathroom every 2 to 3 hours.  What follow-up care is needed?   Your doctor may ask you to make visits to the office to check on your progress. Be sure to keep these visits.  What drugs may be needed?   The doctor may order drugs to:  · Fight an infection  · Help with pain  · Numb your bladder  · Help you pass your urine more easily  Be sure to talk to your doctor about all of your drugs if you are pregnant.  Will physical activity be limited?   Physical activities will not be limited. You may have to pass urine more often.  What changes to diet are needed?   · Do not drink beer, wine, and mixed drinks (alcohol) or caffeine. These can bother the bladder.  · Talk to your doctor about drinking cranberry juice.  What can be done to prevent this health problem?   · Gently cleanse your  genital area each day. Wipe from front to back to keep germs from going in your body.  · If your penis is uncircumcised, retract the foreskin and gently clean around the head of the penis each day.  · Consider other methods of birth control instead of a spermicide.  · Empty your bladder after having sex.  · Empty your bladder before going to sleep.  When do I need to call the doctor?   · Signs of infection. These include a fever of 100.4°F (38°C) or higher, chills, back pain, nausea, throwing up, or bloody urine.  · Signs come back after treatment ends  · You notice more blood in your urine.  · Your signs get worse or do not improve within 24 hours of starting treatment.  · You are not able to urinate for more than 8 hours.  · Your signs come back after treatment has stopped.  Teach Back: Helping You Understand   The Teach Back Method helps you understand the information we are giving you. After you talk with the staff, tell them in your own words what you learned. This helps to make sure the staff has described each thing clearly. It also helps to explain things that may have been confusing. Before going home, make sure you can do these things:  · I can tell you about my condition.  · I can tell you how to prevent this problem from coming back.  · I can tell you what I will do if my signs do not get better after 24 hours of treatment or come back after I have finished treatment.  Where can I learn more?   American Academy of [employer]icians  https://familydoctor.org/condition/urinary-tract-infections/   NHS Choices  https://www.nhs.uk/conditions/urinary-tract-infections-utis/   Last Reviewed Date   2021-06-03  Consumer Information Use and Disclaimer   This information is not specific medical advice and does not replace information you receive from your health care provider. This is only a brief summary of general information. It does NOT include all information about conditions, illnesses, injuries, tests,  procedures, treatments, therapies, discharge instructions or life-style choices that may apply to you. You must talk with your health care provider for complete information about your health and treatment options. This information should not be used to decide whether or not to accept your health care providers advice, instructions or recommendations. Only your health care provider has the knowledge and training to provide advice that is right for you.  Copyright   Copyright © 2021 Up[employer], Inc. and its affiliates and/or licensors. All rights reserved.  Patient Education       Urinary Tract Infection Discharge Instructions, Adult   About this topic   A urinary tract infection is a UTI. It is caused by germs getting into the urinary tract. The urinary tract is made up of the kidneys, ureters, bladder, and urethra. The urethra is a tube at the bottom of the bladder. Urine flows out of this tube. The germs enter the urethra and then spread in the bladder. The ureters are small tubes that join the bladder and the kidneys. Most UTIs are infections in either your bladder or your kidneys. Bladder infections are more common and may also be called cystitis. Kidney infections are more serious and may also be called pyelonephritis.         What care is needed at home?   · Ask your doctor what you need to do when you go home. Make sure you ask questions if you do not understand what the doctor says. This way you will know what you need to do.  · Take your drugs as ordered by your doctor.  · Unless your doctor has told you otherwise, drink at least 8 to 10 glasses of water or water-based drinks each day. Do not include drinks with caffeine, like coffee or tea.  · Do not hold back your urine. Go to the bathroom every 2 to 3 hours.  What follow-up care is needed?   Your doctor may ask you to make visits to the office to check on your progress. Be sure to keep these visits.  What drugs may be needed?   The doctor may order drugs  to:  · Fight an infection  · Help with pain  · Numb your bladder  · Help you pass your urine more easily  Be sure to talk to your doctor about all of your drugs if you are pregnant.  Will physical activity be limited?   Physical activities will not be limited. You may have to pass urine more often.  What changes to diet are needed?   · Do not drink beer, wine, and mixed drinks (alcohol) or caffeine. These can bother the bladder.  · Talk to your doctor about drinking cranberry juice.  What can be done to prevent this health problem?   · Gently cleanse your genital area each day. Wipe from front to back to keep germs from going in your body.  · If your penis is uncircumcised, retract the foreskin and gently clean around the head of the penis each day.  · Consider other methods of birth control instead of a spermicide.  · Empty your bladder after having sex.  · Empty your bladder before going to sleep.  When do I need to call the doctor?   · Signs of infection. These include a fever of 100.4°F (38°C) or higher, chills, back pain, nausea, throwing up, or bloody urine.  · Signs come back after treatment ends  · You notice more blood in your urine.  · Your signs get worse or do not improve within 24 hours of starting treatment.  · You are not able to urinate for more than 8 hours.  · Your signs come back after treatment has stopped.  Teach Back: Helping You Understand   The Teach Back Method helps you understand the information we are giving you. After you talk with the staff, tell them in your own words what you learned. This helps to make sure the staff has described each thing clearly. It also helps to explain things that may have been confusing. Before going home, make sure you can do these things:  · I can tell you about my condition.  · I can tell you how to prevent this problem from coming back.  · I can tell you what I will do if my signs do not get better after 24 hours of treatment or come back after I have  finished treatment.  Where can I learn more?   American Academy of Family Physicians  https://familydoctor.org/condition/urinary-tract-infections/   NHS Choices  https://www.nhs.uk/conditions/urinary-tract-infections-utis/   Last Reviewed Date   2021-06-03  Consumer Information Use and Disclaimer   This information is not specific medical advice and does not replace information you receive from your health care provider. This is only a brief summary of general information. It does NOT include all information about conditions, illnesses, injuries, tests, procedures, treatments, therapies, discharge instructions or life-style choices that may apply to you. You must talk with your health care provider for complete information about your health and treatment options. This information should not be used to decide whether or not to accept your health care providers advice, instructions or recommendations. Only your health care provider has the knowledge and training to provide advice that is right for you.  Copyright   Copyright © 2021 UpToDate, Inc. and its affiliates and/or licensors. All rights reserved.

## 2022-01-21 NOTE — PROGRESS NOTES
Subjective:       Patient ID: Kailee Malone is a 76 y.o. female.    Chief Complaint: Burn while urination (Odor to urine)    HPI  Ms Malone c/o frequent urination at night, odor to urine and some burning with urination.  She denies incontinence.   She does have some vaginal dryness.  She is not sexually active.  Review of Systems   Constitutional: Negative.    HENT: Negative.    Respiratory: Negative.    Cardiovascular: Negative.    Gastrointestinal: Negative.    Genitourinary: Positive for dysuria, nocturia and vaginal dryness. Negative for frequency.   Neurological: Negative.    Psychiatric/Behavioral: Negative.          Objective:      Physical Exam  Constitutional:       Appearance: She is normal weight.   HENT:      Head: Normocephalic.      Nose: Nose normal.   Eyes:      Extraocular Movements: Extraocular movements intact.   Cardiovascular:      Rate and Rhythm: Normal rate.   Pulmonary:      Effort: Pulmonary effort is normal.   Abdominal:      General: Abdomen is flat.      Palpations: Abdomen is soft.      Tenderness: There is no right CVA tenderness or left CVA tenderness.   Skin:     General: Skin is warm and dry.   Neurological:      Mental Status: She is alert and oriented to person, place, and time.   Psychiatric:         Mood and Affect: Mood normal.         Behavior: Behavior normal.         Assessment:       Problem List Items Addressed This Visit    None     Visit Diagnoses     Dysuria    -  Primary    Relevant Medications    sulfamethoxazole-trimethoprim 400-80mg (BACTRIM,SEPTRA) 400-80 mg per tablet    Other Relevant Orders    POCT URINALYSIS W/O SCOPE (Completed)    Abnormal urine        Relevant Orders    Urine culture    Vaginal atrophy        Postmenopausal        Relevant Medications    estradioL (ESTRACE) 0.01 % (0.1 mg/gram) vaginal cream          Plan:     Rx for Bactrium DS    Estrace vaginal cream    Increase water to 8 cups per day    F/u in 2 month on vaginal atrophy .

## 2022-01-23 LAB — UA COMPLETE W REFLEX CULTURE PNL UR: ABNORMAL

## 2022-01-25 ENCOUNTER — TELEPHONE (OUTPATIENT)
Dept: OBSTETRICS AND GYNECOLOGY | Facility: CLINIC | Age: 77
End: 2022-01-25
Payer: MEDICARE

## 2022-01-25 NOTE — TELEPHONE ENCOUNTER
----- Message from Danni Espinoza CNM sent at 1/24/2022 11:45 AM CST -----  Review with pt.as urine culture showed Klebsiella pneumoniae and the Bactrim DS she was prescribed will treat this.

## 2022-02-01 ENCOUNTER — HOSPITAL ENCOUNTER (OUTPATIENT)
Dept: RADIOLOGY | Facility: HOSPITAL | Age: 77
Discharge: HOME | End: 2022-02-01
Attending: NURSE PRACTITIONER
Payer: MEDICARE

## 2022-02-01 ENCOUNTER — TELEPHONE (OUTPATIENT)
Dept: GASTROENTEROLOGY | Facility: CLINIC | Age: 77
End: 2022-02-01
Payer: MEDICARE

## 2022-02-01 DIAGNOSIS — K76.0 FATTY LIVER: ICD-10-CM

## 2022-02-01 DIAGNOSIS — K76.0 FATTY LIVER: Primary | ICD-10-CM

## 2022-02-01 PROCEDURE — 76705 ECHO EXAM OF ABDOMEN: CPT | Mod: TC

## 2022-02-01 PROCEDURE — 76705 US ABDOMEN LIMITED: ICD-10-PCS | Mod: 26,,,

## 2022-02-01 PROCEDURE — 76705 ECHO EXAM OF ABDOMEN: CPT | Mod: 26,,,

## 2022-02-01 NOTE — TELEPHONE ENCOUNTER
Called ultrasound results and recommendations. Scheduled repeat ultrasound and office visit in 6 months. Reminded patient of EGD on 02/03/2022. Patient verbalized good understanding.      ----- Message from ZANA Redmond sent at 2/1/2022  1:10 PM CST -----  Fatty liver. This was needed in 6 months

## 2022-02-01 NOTE — TELEPHONE ENCOUNTER
Called ultrasound results. Instructed that we will see her back in 6 months with another ultrasound. Patient verbalized good understanding.    ----- Message from ZANA Redmond sent at 2/1/2022  1:10 PM CST -----  Fatty liver. This was needed in 6 months

## 2022-02-03 ENCOUNTER — HOSPITAL ENCOUNTER (OUTPATIENT)
Dept: GASTROENTEROLOGY | Facility: HOSPITAL | Age: 77
Discharge: HOME | End: 2022-02-03
Attending: NURSE PRACTITIONER
Payer: MEDICARE

## 2022-02-03 ENCOUNTER — ANESTHESIA EVENT (OUTPATIENT)
Dept: GASTROENTEROLOGY | Facility: HOSPITAL | Age: 77
End: 2022-02-03
Payer: MEDICARE

## 2022-02-03 ENCOUNTER — ANESTHESIA (OUTPATIENT)
Dept: GASTROENTEROLOGY | Facility: HOSPITAL | Age: 77
End: 2022-02-03
Payer: MEDICARE

## 2022-02-03 VITALS
HEART RATE: 78 BPM | HEIGHT: 65 IN | RESPIRATION RATE: 18 BRPM | WEIGHT: 185 LBS | SYSTOLIC BLOOD PRESSURE: 108 MMHG | DIASTOLIC BLOOD PRESSURE: 72 MMHG | OXYGEN SATURATION: 97 % | BODY MASS INDEX: 30.82 KG/M2 | TEMPERATURE: 97 F

## 2022-02-03 DIAGNOSIS — R10.13 EPIGASTRIC PAIN: ICD-10-CM

## 2022-02-03 DIAGNOSIS — R13.19 ESOPHAGEAL DYSPHAGIA: ICD-10-CM

## 2022-02-03 DIAGNOSIS — K44.9 HH (HIATUS HERNIA): ICD-10-CM

## 2022-02-03 DIAGNOSIS — R13.10 DYSPHAGIA, UNSPECIFIED TYPE: ICD-10-CM

## 2022-02-03 DIAGNOSIS — K29.00 ACUTE SUPERFICIAL GASTRITIS WITHOUT HEMORRHAGE: ICD-10-CM

## 2022-02-03 DIAGNOSIS — K25.3 ACUTE GASTRIC ULCER WITHOUT HEMORRHAGE OR PERFORATION: ICD-10-CM

## 2022-02-03 PROCEDURE — D9220A PRA ANESTHESIA: ICD-10-PCS | Mod: ,,, | Performed by: NURSE ANESTHETIST, CERTIFIED REGISTERED

## 2022-02-03 PROCEDURE — 27201423 OPTIME MED/SURG SUP & DEVICES STERILE SUPPLY

## 2022-02-03 PROCEDURE — 43239 EGD BIOPSY SINGLE/MULTIPLE: CPT

## 2022-02-03 PROCEDURE — 88341 IMHCHEM/IMCYTCHM EA ADD ANTB: CPT | Mod: 26,,, | Performed by: PATHOLOGY

## 2022-02-03 PROCEDURE — 63600175 PHARM REV CODE 636 W HCPCS: Performed by: NURSE ANESTHETIST, CERTIFIED REGISTERED

## 2022-02-03 PROCEDURE — 27000284 HC CANNULA NASAL

## 2022-02-03 PROCEDURE — 37000008 HC ANESTHESIA 1ST 15 MINUTES

## 2022-02-03 PROCEDURE — 37000009 HC ANESTHESIA EA ADD 15 MINS

## 2022-02-03 PROCEDURE — 88305 TISSUE EXAM BY PATHOLOGIST: CPT | Mod: SUR | Performed by: INTERNAL MEDICINE

## 2022-02-03 PROCEDURE — 88305 TISSUE EXAM BY PATHOLOGIST: CPT | Mod: 26,,, | Performed by: PATHOLOGY

## 2022-02-03 PROCEDURE — 88342 SURGICAL PATHOLOGY: ICD-10-PCS | Mod: 26,,, | Performed by: PATHOLOGY

## 2022-02-03 PROCEDURE — 88305 SURGICAL PATHOLOGY: ICD-10-PCS | Mod: 26,XU,, | Performed by: PATHOLOGY

## 2022-02-03 PROCEDURE — 25000003 PHARM REV CODE 250: Performed by: NURSE ANESTHETIST, CERTIFIED REGISTERED

## 2022-02-03 PROCEDURE — 88341 SURGICAL PATHOLOGY: ICD-10-PCS | Mod: 26,,, | Performed by: PATHOLOGY

## 2022-02-03 PROCEDURE — C1889 IMPLANT/INSERT DEVICE, NOC: HCPCS

## 2022-02-03 PROCEDURE — 25000003 PHARM REV CODE 250

## 2022-02-03 PROCEDURE — D9220A PRA ANESTHESIA: Mod: ,,, | Performed by: NURSE ANESTHETIST, CERTIFIED REGISTERED

## 2022-02-03 PROCEDURE — 88342 IMHCHEM/IMCYTCHM 1ST ANTB: CPT | Mod: 26,,, | Performed by: PATHOLOGY

## 2022-02-03 PROCEDURE — 43450 DILATE ESOPHAGUS 1/MULT PASS: CPT

## 2022-02-03 RX ORDER — PROPOFOL 10 MG/ML
VIAL (ML) INTRAVENOUS
Status: DISCONTINUED | OUTPATIENT
Start: 2022-02-03 | End: 2022-02-03

## 2022-02-03 RX ORDER — SODIUM CHLORIDE 9 MG/ML
INJECTION, SOLUTION INTRAVENOUS CONTINUOUS PRN
Status: DISCONTINUED | OUTPATIENT
Start: 2022-02-03 | End: 2022-02-03

## 2022-02-03 RX ORDER — SODIUM CHLORIDE 0.9 % (FLUSH) 0.9 %
10 SYRINGE (ML) INJECTION
Status: DISCONTINUED | OUTPATIENT
Start: 2022-02-03 | End: 2022-02-04 | Stop reason: HOSPADM

## 2022-02-03 RX ORDER — LIDOCAINE HYDROCHLORIDE 20 MG/ML
INJECTION, SOLUTION EPIDURAL; INFILTRATION; INTRACAUDAL; PERINEURAL
Status: DISCONTINUED | OUTPATIENT
Start: 2022-02-03 | End: 2022-02-03

## 2022-02-03 RX ADMIN — PROPOFOL 50 MG: 10 INJECTION, EMULSION INTRAVENOUS at 12:02

## 2022-02-03 RX ADMIN — PROPOFOL 100 MG: 10 INJECTION, EMULSION INTRAVENOUS at 12:02

## 2022-02-03 RX ADMIN — SODIUM CHLORIDE: 9 INJECTION, SOLUTION INTRAVENOUS at 12:02

## 2022-02-03 RX ADMIN — LIDOCAINE HYDROCHLORIDE 50 MG: 20 INJECTION, SOLUTION EPIDURAL; INFILTRATION; INTRACAUDAL; PERINEURAL at 12:02

## 2022-02-03 NOTE — H&P
[facility] - Endoscopy  Gastroenterology  H&P    Patient Name: Kailee Malone  MRN: [id number]  Admission Date: 2/3/2022  Code Status: Full Code    Attending Provider: Javier Carmona MD  Primary Care Physician: Kenny Valenzuela III, MD  Principal Problem:<principal problem not specified>    Subjective:     History of Present Illness: Pt c/o strangling and esophageal dysphagia; for egd with dilation.    Past Medical History:   Diagnosis Date    Diverticula, colon 10/4/2021    History of colon polyps 10/4/2021    Hypertension     Screening for colon cancer 10/4/2021       Past Surgical History:   Procedure Laterality Date    ARTHROSCOPY OF KNEE Right 5/24/2021    Procedure: ARTHROSCOPY, KNEE;  Surgeon: Kenny Valenzuela III, MD;  Location: [facility];  Service: Orthopedics;  Laterality: Right;    BREAST BIOPSY      BREAST SURGERY      lumpectomy    HYSTERECTOMY      KNEE ARTHROSCOPY W/ MENISCECTOMY Right 5/24/2021    Procedure: ARTHROSCOPY, KNEE, WITH MENISCECTOMY;  Surgeon: Kenny Valenzuela III, MD;  Location: [facility];  Service: Orthopedics;  Laterality: Right;  medial menisectomy    RT WRIST         Review of patient's allergies indicates:   Allergen Reactions    Betadine prepstick [povidone-iodine-ethyl alcohol]     Penicillins      Family History     Problem Relation (Age of Onset)    Breast cancer Mother, Maternal Aunt        Tobacco Use    Smoking status: Never Smoker    Smokeless tobacco: Never Used   Substance and Sexual Activity    Alcohol use: Not Currently    Drug use: Not Currently    Sexual activity: Not Currently     Review of Systems   Respiratory: Negative.    Cardiovascular: Negative.    Gastrointestinal: Negative.      Objective:     Vital Signs (Most Recent):  Temp: 97.7 °F (36.5 °C) (02/03/22 1203)  Pulse: 78 (02/03/22 1203)  Resp: 16 (02/03/22 1203)  BP: 126/64 (02/03/22 1203)  SpO2: 95 % (02/03/22 1203) Vital Signs (24h Range):  Temp:  [97.7 °F (36.5 °C)] 97.7 °F  (36.5 °C)  Pulse:  [78] 78  Resp:  [16] 16  SpO2:  [95 %] 95 %  BP: (126)/(64) 126/64     Weight: 83.9 kg (185 lb) (02/03/22 1203)  Body mass index is 30.79 kg/m².    No intake or output data in the 24 hours ending 02/03/22 1248    Lines/Drains/Airways     Peripheral Intravenous Line                 Peripheral IV - Single Lumen 02/03/22 1203 22 G Posterior;Right Hand <1 day                Physical Exam  Vitals reviewed.   Constitutional:       General: She is not in acute distress.     Appearance: Normal appearance. She is well-developed. She is not ill-appearing.   HENT:      Head: Normocephalic and atraumatic.      Nose: Nose normal.   Eyes:      Pupils: Pupils are equal, round, and reactive to light.   Cardiovascular:      Rate and Rhythm: Normal rate and regular rhythm.   Pulmonary:      Effort: Pulmonary effort is normal.      Breath sounds: Normal breath sounds. No wheezing.   Abdominal:      General: Abdomen is flat. Bowel sounds are normal. There is no distension.      Palpations: Abdomen is soft.      Tenderness: There is no abdominal tenderness. There is no guarding.   Skin:     General: Skin is warm and dry.      Coloration: Skin is not jaundiced.   Neurological:      Mental Status: She is alert.   Psychiatric:         Attention and Perception: Attention normal.         Mood and Affect: Affect normal.         Speech: Speech normal.         Behavior: Behavior is cooperative.      Comments: Pt was calm while speaking.         Significant Labs:  CBC: No results for input(s): WBC, HGB, HCT, PLT in the last 48 hours.  CMP: No results for input(s): GLU, CALCIUM, ALBUMIN, PROT, NA, K, CO2, CL, BUN, CREATININE, ALKPHOS, ALT, AST, BILITOT in the last 48 hours.    Significant Imaging:  Imaging results within the past 24 hours have been reviewed.    Assessment/Plan:     There are no hospital problems to display for this patient.        Imp: esophageal dysphagia  Plan: egd with dilation    Javier Carmona,  MD  Gastroenterology  [facility] - Endoscopy

## 2022-02-03 NOTE — ANESTHESIA POSTPROCEDURE EVALUATION
Anesthesia Post Evaluation    Patient: Kailee Malone    Procedure(s) Performed: * No procedures listed *    Final Anesthesia Type: general      Patient location during evaluation: GI PACU  Patient participation: Yes- Able to Participate  Level of consciousness: awake and alert  Post-procedure vital signs: reviewed and stable  Pain management: adequate  Airway patency: patent    PONV status at discharge: No PONV  Anesthetic complications: no      Cardiovascular status: blood pressure returned to baseline and hemodynamically stable  Respiratory status: spontaneous ventilation  Hydration status: euvolemic  Follow-up not needed.  Comments: Pt voices appreciation for care          Vitals Value Taken Time   /75 02/03/22 1316   Temp 97.3 02/03/22 1321   Pulse 85 02/03/22 1320   Resp 19 02/03/22 1320   SpO2 96 % 02/03/22 1320   Vitals shown include unvalidated device data.      No case tracking events are documented in the log.      Pain/Bri Score: Bri Score: 10 (2/3/2022  1:08 PM)

## 2022-02-03 NOTE — TRANSFER OF CARE
"Anesthesia Transfer of Care Note    Patient: Kailee Malone    Procedure(s) Performed: * No procedures listed *    Patient location: GI    Anesthesia Type: general    Transport from OR: Continuous SpO2 monitoring in transport. Continuous ECG monitoring in transport. Transported from OR on 2-3 L/min O2 by NC with adequate spontaneous ventilation    Post pain: adequate analgesia    Post assessment: no apparent anesthetic complications    Post vital signs: stable    Level of consciousness: sedated and responds to stimulation    Nausea/Vomiting: no nausea/vomiting    Complications: none    Transfer of care protocol was followedComments: Good SV continue, NAD, VSS, RTRN      Last vitals:   Visit Vitals  BP (!) 102/50   Pulse 83   Temp 36.3 °C (97.4 °F)   Resp 16   Ht 5' 5" (1.651 m)   Wt 83.9 kg (185 lb)   SpO2 96%   Breastfeeding No   BMI 30.79 kg/m²     "

## 2022-02-03 NOTE — DISCHARGE INSTRUCTIONS
Procedure Date  2/3/22     Impression  Overall Impression: Mucosa of the esophagus is normal. Distal esophageal biopsies were obtained and the esophagus was dilated with a 48F Macedo. The stomach has gastritis and a 3cm hiatus hernia. A non-bleeding pre-pyloric ulcer was noted and biopsies were obtained from the ulcer, stomach. The duodenum had normal mucosa.     Recommendation  Await pathology results; avoid nsaids; ppi 1/AM; repeat dilation prn.     No driving today, no operating heavy machinery, no signing any legal documents until tomorrow.    Drink lots of fluids, resume regular diet.  Take your normal medications.

## 2022-02-03 NOTE — ANESTHESIA PREPROCEDURE EVALUATION
02/03/2022  Kailee Malone is a 76 y.o., female.    Anesthesia Evaluation    I have reviewed the Patient Summary Reports.    I have reviewed the Nursing Notes. I have reviewed the NPO Status.   I have reviewed the Medications.     Review of Systems  Anesthesia Hx:  No problems with previous Anesthesia    Social:  Non-Smoker, No Alcohol Use    Hematology/Oncology:  Hematology Normal   Oncology Normal     EENT/Dental:EENT/Dental Normal   Cardiovascular:   Hypertension    Pulmonary:  Pulmonary Normal    Renal/:  Renal/ Normal     Hepatic/GI:   Liver Disease,    Musculoskeletal:   Arthritis     Neurological:  Neurology Normal    Endocrine:  Endocrine Normal    Dermatological:  Skin Normal    Psych:  Psychiatric Normal           Physical Exam  General:  Well nourished, Obesity    Airway/Jaw/Neck:  Airway Findings: Mouth Opening: Normal Tongue: Normal  General Airway Assessment: Adult  Mallampati: II  TM Distance: Normal, at least 6 cm  Jaw/Neck Findings:     Eyes/Ears/Nose:  Eyes/Ears/Nose Findings:    Dental:  Dental Findings: In tact   Chest/Lungs:  Chest/Lungs Findings: Clear to auscultation, Normal Respiratory Rate     Heart/Vascular:  Heart Findings: Rate: Normal  Rhythm: Regular Rhythm  Sounds: Normal     Abdomen:  Abdomen Findings:  Normal, Soft, Nontender     Musculoskeletal:  Musculoskeletal Findings:     Mental Status:  Mental Status Findings:  Cooperative, Alert and Oriented         Anesthesia Plan  Type of Anesthesia, risks & benefits discussed:  Anesthesia Type:  general    Patient's Preference:   Plan Factors:          Intra-op Monitoring Plan: standard ASA monitors  Intra-op Monitoring Plan Comments:   Post Op Pain Control Plan: multimodal analgesia  Post Op Pain Control Plan Comments:     Induction:   IV  Beta Blocker:  Patient is on a Beta-Blocker and has received one dose within the past  24 hours (No further documentation required).       Informed Consent: Patient understands risks and agrees with Anesthesia plan.  Questions answered. Anesthesia consent signed with patient.  ASA Score: 2     Day of Surgery Review of History & Physical: I have interviewed and examined the patient. I have reviewed the patient's H&P dated:  There are no significant changes.          Ready For Surgery From Anesthesia Perspective.

## 2022-02-04 PROBLEM — K22.70 BARRETT'S ESOPHAGUS WITHOUT DYSPLASIA: Status: ACTIVE | Noted: 2022-02-04

## 2022-02-04 LAB
DHEA SERPL-MCNC: NORMAL
ESTROGEN SERPL-MCNC: NORMAL PG/ML
LAB AP GROSS DESCRIPTION: NORMAL
LAB AP LABORATORY NOTES: NORMAL
T3RU NFR SERPL: NORMAL %

## 2022-02-10 ENCOUNTER — TELEPHONE (OUTPATIENT)
Dept: GASTROENTEROLOGY | Facility: CLINIC | Age: 77
End: 2022-02-10
Payer: MEDICARE

## 2022-02-10 NOTE — TELEPHONE ENCOUNTER
Called EGD path results and recommendations. Patient states she has some wheezing and has been evaluated by her PCP and has x-ray and everything was clear. Patient is concerned it is caused by reflux. Scheduled patient follow up appointment on 02/22/2022 at 1030 am. Patient verbalized good understanding.      ----- Message from Javier Carmona MD sent at 2/4/2022  3:32 PM CST -----  Place pt on list for egd in 3 years. Biopsies show gastritis with ulcer, reflux esophagitis with Kirkpatrick esophagus.

## 2022-02-22 ENCOUNTER — OFFICE VISIT (OUTPATIENT)
Dept: GASTROENTEROLOGY | Facility: CLINIC | Age: 77
End: 2022-02-22
Payer: MEDICARE

## 2022-02-22 VITALS
HEART RATE: 71 BPM | SYSTOLIC BLOOD PRESSURE: 113 MMHG | WEIGHT: 184 LBS | DIASTOLIC BLOOD PRESSURE: 75 MMHG | HEIGHT: 65 IN | OXYGEN SATURATION: 96 % | BODY MASS INDEX: 30.66 KG/M2

## 2022-02-22 DIAGNOSIS — R25.1 TREMORS OF NERVOUS SYSTEM: ICD-10-CM

## 2022-02-22 DIAGNOSIS — K76.0 FATTY LIVER: ICD-10-CM

## 2022-02-22 DIAGNOSIS — K22.70 BARRETT'S ESOPHAGUS WITHOUT DYSPLASIA: Primary | ICD-10-CM

## 2022-02-22 DIAGNOSIS — R10.13 EPIGASTRIC PAIN: ICD-10-CM

## 2022-02-22 PROCEDURE — 99214 PR OFFICE/OUTPT VISIT, EST, LEVL IV, 30-39 MIN: ICD-10-PCS | Mod: ,,, | Performed by: NURSE PRACTITIONER

## 2022-02-22 PROCEDURE — 99214 OFFICE O/P EST MOD 30 MIN: CPT | Mod: ,,, | Performed by: NURSE PRACTITIONER

## 2022-02-22 NOTE — PROGRESS NOTES
Kailee Malone is a 76 y.o. female here for Follow-up        PCP: Caden Bradford  Referring Provider: No referring provider defined for this encounter.     HPI:  Presents for follow up after EGD 2/3/22. She did have a non-bleeding pre prepyloric ulcer. Also, positive for Kirkpatrick's esophagus. Expressed importance in follow up with repeat EDG in 3 years. States that she has stopped Naproxen. Is taking Prevacid. Reflux is reported as better. Occasional dysphagia. Advised to sit up for 30 minutes after taking Fosamax. Does have fatty liver. Liver ultrasound 2/1/22 reviewed. She did see ARDEN Davalos and was treated for UTI. Symptoms are now resolved. Reports frequent tremors. States that tremors have increased and at times she is not able to hold a glass to drink. She is in the process of changing PCM's. She is requesting a referral to neurology.        ROS:  Review of Systems   Constitutional: Negative for appetite change, fatigue, fever and unexpected weight change.   HENT: Negative for trouble swallowing.    Respiratory: Negative for shortness of breath and wheezing.    Cardiovascular: Negative for chest pain and palpitations.   Gastrointestinal: Positive for abdominal pain and reflux. Negative for blood in stool, change in bowel habit, constipation, diarrhea, nausea, vomiting, fecal incontinence and change in bowel habit.   Genitourinary: Negative for dysuria and frequency.   Musculoskeletal: Negative for back pain, gait problem and joint swelling.   Integumentary:  Negative for pallor.   Allergic/Immunologic: Negative for food allergies and immunocompromised state.   Neurological: Positive for tremors. Negative for dizziness, weakness and light-headedness.   Hematological: Does not bruise/bleed easily.   Psychiatric/Behavioral: The patient is nervous/anxious.           PMHX:  has a past medical history of Acute gastric ulcer without hemorrhage or perforation (2/3/2022), Acute superficial gastritis without  "hemorrhage (2/3/2022), Kirkpatrick's esophagus without dysplasia (2/4/2022), Diverticula, colon (10/4/2021), HH (hiatus hernia) (2/3/2022), History of colon polyps (10/4/2021), Hypertension, and Screening for colon cancer (10/4/2021).    PSHX:  has a past surgical history that includes RT WRIST; Hysterectomy; Breast surgery; Arthroscopy of knee (Right, 5/24/2021); Knee arthroscopy w/ meniscectomy (Right, 5/24/2021); and Breast biopsy.    PFHX: family history includes Breast cancer in her maternal aunt and mother.    PSlHX:  reports that she has never smoked. She has never used smokeless tobacco. She reports that she does not drink alcohol and does not use drugs.        Review of patient's allergies indicates:   Allergen Reactions    Betadine prepstick [povidone-iodine-ethyl alcohol]     Penicillins        Medication List with Changes/Refills   Current Medications    ALENDRONATE (FOSAMAX) 70 MG TABLET    Take 70 mg by mouth every 7 days.    ESTRADIOL (ESTRACE) 0.01 % (0.1 MG/GRAM) VAGINAL CREAM    Place 1 g vaginally once daily.    FLUOXETINE 20 MG CAPSULE    Take 20 mg by mouth once daily.    HYDROCODONE-ACETAMINOPHEN (NORCO) 5-325 MG PER TABLET    Take 1 tablet by mouth every 4 (four) hours as needed for Pain.    KETOCONAZOLE (NIZORAL) 2 % CREAM    APPLY TO AFFECTED AREAS TWICE DAILY UNTIL CLEAR    LANSOPRAZOLE (PREVACID) 30 MG CAPSULE    Take 1 capsule (30 mg total) by mouth once daily.    PROPRANOLOL (INDERAL) 40 MG TABLET    Take 40 mg by mouth once daily.    TRIAMTERENE-HYDROCHLOROTHIAZIDE 37.5-25 MG (MAXZIDE-25) 37.5-25 MG PER TABLET    Take 1 tablet by mouth once daily.        Objective Findings:  Vital Signs:  /75   Pulse 71   Ht 5' 5" (1.651 m)   Wt 83.5 kg (184 lb)   SpO2 96%   BMI 30.62 kg/m²  Body mass index is 30.62 kg/m².    Physical Exam:  Physical Exam  Vitals and nursing note reviewed.   Constitutional:       General: She is not in acute distress.     Appearance: Normal appearance.   HENT: "      Mouth/Throat:      Mouth: Mucous membranes are moist.   Eyes:      General: No scleral icterus.  Cardiovascular:      Rate and Rhythm: Normal rate and regular rhythm.      Heart sounds: No murmur heard.  Pulmonary:      Breath sounds: No wheezing, rhonchi or rales.   Abdominal:      General: Bowel sounds are normal. There is no distension.      Palpations: Abdomen is soft. There is no mass.      Tenderness: There is no abdominal tenderness. There is no guarding or rebound.      Hernia: No hernia is present.   Musculoskeletal:      Right lower leg: No edema.      Left lower leg: No edema.   Skin:     General: Skin is warm and dry.      Coloration: Skin is not jaundiced or pale.      Findings: No bruising or rash.   Neurological:      Mental Status: She is alert and oriented to person, place, and time.   Psychiatric:         Mood and Affect: Mood normal.          Labs:  Lab Results   Component Value Date    WBC 11.67 (H) 12/21/2021    HGB 12.3 12/21/2021    HCT 37.9 (L) 12/21/2021    MCV 93.1 12/21/2021    RDW 13.2 12/21/2021     12/21/2021    LYMPH 14.6 (L) 12/21/2021    LYMPH 1.70 12/21/2021    MONO 10.2 (H) 12/21/2021    EOS 0.22 12/21/2021    BASO 0.05 12/21/2021     Lab Results   Component Value Date     (L) 12/21/2021    K 4.4 12/21/2021     12/21/2021    CO2 27 12/21/2021    GLU 83 12/21/2021    BUN 19 (H) 12/21/2021    CREATININE 0.82 12/21/2021    CALCIUM 9.0 12/21/2021    PROT 7.5 12/21/2021    ALBUMIN 3.9 12/21/2021    BILITOT 0.4 12/21/2021    ALKPHOS 30 (L) 12/21/2021    AST 20 12/21/2021    ALT 33 12/21/2021         Imaging: EGD w Dilation    Result Date: 2/3/2022  Procedure Date 2/3/22 Impression Overall      Mucosa of the esophagus is normal. Distal esophageal biopsies were obtained and the esophagus was dilated with a 48F Macedo. The stomach has gastritis and a 3cm hiatus hernia. A non-bleeding pre-pyloric ulcer was noted and biopsies were obtained from the ulcer, stomach. The  duodenum had normal mucosa. Recommendation Await pathology results; avoid nsaids; ppi 1/AM; repeat dilation prn. Indication Epigastric pain, Dysphagia, unspecified type Providers Attending: Javier Carmona MD Fellow:  Other Staff: Lucy Hoover, BHARAT, Aric Brandon RN, ST Caro, Joni Edmondson, CRNA Medications Moderate sedation administered by anesthesia staff - See anesthesia record. Preprocedure A history and physical has been performed, and patient medication allergies have been reviewed. The patient's tolerance of previous anesthesia has been reviewed. The risks and benefits of the procedure and the sedation options and risks were discussed with the patient. All questions were answered and informed consent obtained. ASA Score: ASA 2 - Patient with mild systemic disease with no functional limitations Mallampati Airway Score: II (hard and soft palate, upper portion of tonsils anduvula visible) Details of the Procedure The patient underwent monitored anesthesia care, which was administered by an anesthesia professional. The patient's blood pressure, heart rate, level of consciousness, oxygen, respirations, ECG and ETCO2 were monitored throughout the procedure. The scope was introduced through the mouth and advanced to the third part of the duodenum. Retroflexion was performed in the cardia. Prior to the procedure, the patient's H. Pylori status was unknown. The patient's estimated blood loss was minimal (<5 mL). The procedure was not difficult. The patient tolerated the procedure well. There were no apparent complications. Scope: Gastroscope Scope Serial: [id number] Events Procedure Events Event Event Time Procedure Events Event Event Time SCOPE IN 2/3/2022 12:52 PM SCOPE OUT 2/3/2022 12:56 PM Findings Erythematous mucosa in the fundus of the stomach and antrum; performed cold forceps biopsy Small ulcer in the antrum with clean base (Don III); performed cold forceps biopsy 3 cm hiatal  hernia Dilated in the esophagus with Macedo dilator     US Abdomen Limited    Result Date: 2/1/2022  EXAMINATION: US ABDOMEN LIMITED CLINICAL HISTORY: Fatty (change of) liver, not elsewhere classified FINDINGS: Liver is 19.1 cm in length with increased echogenicity and mild attenuation of the ultrasound beam No gallstones or biliary ductal dilatation seen No right renal hydronephrosis identified Most of the pancreas aorta and IVC obscured by bowel gas     1. Midline retroperitoneal structures are obscured 2. Fatty infiltration of the liver Ultrasound images captured and stored Electronically signed by: Cherelle Enamorado Date:    02/01/2022 Time:    12:46        Assessment:  Kailee Malone is a 76 y.o. female here with:  1. Kirkpatrick's esophagus without dysplasia    2. Epigastric pain    3. Fatty liver    4. Tremors of nervous system          Recommendations:  1. Continue Prevacid. Avoid NSAID's. Sit up for 30 minutes after taking Fosamax. Avoid spicy foods. No citric acids or tomato based foods  2. Liver ultrasound is already scheduled for 6 months  3. Refer to neurology, patient request due to tremors    No follow-ups on file.      Order summary:  Orders Placed This Encounter    Ambulatory referral/consult to Neurology       Thank you for allowing me to participate in the care of Kailee Malone.      ABDIRAHMAN Mercado

## 2022-03-21 ENCOUNTER — OFFICE VISIT (OUTPATIENT)
Dept: OBSTETRICS AND GYNECOLOGY | Facility: CLINIC | Age: 77
End: 2022-03-21
Payer: MEDICARE

## 2022-03-21 VITALS
TEMPERATURE: 97 F | HEART RATE: 71 BPM | WEIGHT: 189.19 LBS | BODY MASS INDEX: 31.52 KG/M2 | SYSTOLIC BLOOD PRESSURE: 113 MMHG | HEIGHT: 65 IN | OXYGEN SATURATION: 96 % | DIASTOLIC BLOOD PRESSURE: 63 MMHG

## 2022-03-21 DIAGNOSIS — Z87.440 H/O URINARY INFECTION: ICD-10-CM

## 2022-03-21 DIAGNOSIS — N95.2 VAGINAL ATROPHY: ICD-10-CM

## 2022-03-21 DIAGNOSIS — Z09 FOLLOW UP: Primary | ICD-10-CM

## 2022-03-21 LAB
BILIRUB SERPL-MCNC: NORMAL MG/DL
BLOOD URINE, POC: NORMAL
COLOR, POC UA: YELLOW
GLUCOSE UR QL STRIP: NORMAL
KETONES UR QL STRIP: NORMAL
LEUKOCYTE ESTERASE URINE, POC: NORMAL
NITRITE, POC UA: NORMAL
PH, POC UA: 6.5
PROTEIN, POC: NORMAL
SPECIFIC GRAVITY, POC UA: 1.02
UROBILINOGEN, POC UA: 0.2

## 2022-03-21 PROCEDURE — 99212 PR OFFICE/OUTPT VISIT, EST, LEVL II, 10-19 MIN: ICD-10-PCS | Mod: ,,, | Performed by: ADVANCED PRACTICE MIDWIFE

## 2022-03-21 PROCEDURE — 99212 OFFICE O/P EST SF 10 MIN: CPT | Mod: ,,, | Performed by: ADVANCED PRACTICE MIDWIFE

## 2022-03-21 PROCEDURE — 81003 URINALYSIS AUTO W/O SCOPE: CPT | Mod: RHCUB | Performed by: ADVANCED PRACTICE MIDWIFE

## 2022-03-21 NOTE — PROGRESS NOTES
"Patient ID:  Kailee Malone is a 77 y.o. female      Chief Complaint:   Chief Complaint   Patient presents with    Follow up        HPI:  Mrs. Dugan is in for follow up medication surveillance and ho UTI. She was started on Estrace cream for vag atrophy 2 month ago due to vag dryness and irritation. She reports using twice weekly as prescribed. Has not noticed much improvement but desires to continue. UTI s/s noted at previous appt resolved. Desires recheck of urine. Recently dx with stomach ulcer and Fatty liver disease.   LMP: Postmenopausal  Sexually active:  no      Past Medical History:   Diagnosis Date    Acute gastric ulcer without hemorrhage or perforation 2/3/2022    Acute superficial gastritis without hemorrhage 2/3/2022    Kirkpatrick's esophagus without dysplasia 2/4/2022    Diverticula, colon 10/4/2021    HH (hiatus hernia) 2/3/2022    History of colon polyps 10/4/2021    Hypertension     Screening for colon cancer 10/4/2021     Past Surgical History:   Procedure Laterality Date    ARTHROSCOPY OF KNEE Right 5/24/2021    Procedure: ARTHROSCOPY, KNEE;  Surgeon: Kenny Valenzuela III, MD;  Location: [facility];  Service: Orthopedics;  Laterality: Right;    BREAST BIOPSY      BREAST SURGERY      lumpectomy    HYSTERECTOMY      KNEE ARTHROSCOPY W/ MENISCECTOMY Right 5/24/2021    Procedure: ARTHROSCOPY, KNEE, WITH MENISCECTOMY;  Surgeon: Kenny Valenzuela III, MD;  Location: [facility];  Service: Orthopedics;  Laterality: Right;  medial menisectomy    RT WRIST         OB History    No obstetric history on file.         /63   Pulse 71   Temp 97.1 °F (36.2 °C)   Ht 5' 5" (1.651 m)   Wt 85.8 kg (189 lb 3.2 oz)   SpO2 96%   BMI 31.48 kg/m²   Wt Readings from Last 3 Encounters:   03/21/22 85.8 kg (189 lb 3.2 oz)   02/22/22 83.5 kg (184 lb)   02/03/22 83.9 kg (185 lb)          ROS:  Review of Systems   Constitutional: Negative.    Eyes: Negative.    Respiratory: Negative.  "   Cardiovascular: Negative.    Gastrointestinal: Negative.    Genitourinary: Negative.  Positive for vaginal dryness.   Musculoskeletal: Negative.    Neurological: Negative.    Psychiatric/Behavioral: Negative.           PHYSICAL EXAM:  Physical Exam  Constitutional:       Appearance: Normal appearance.   Eyes:      Extraocular Movements: Extraocular movements intact.   Cardiovascular:      Rate and Rhythm: Normal rate.      Pulses: Normal pulses.   Pulmonary:      Effort: Pulmonary effort is normal.   Abdominal:      Palpations: Abdomen is soft.   Musculoskeletal:         General: Normal range of motion.      Cervical back: Normal range of motion.   Skin:     General: Skin is warm and dry.   Neurological:      Mental Status: She is alert and oriented to person, place, and time.   Psychiatric:         Mood and Affect: Mood normal.         Behavior: Behavior normal.         Thought Content: Thought content normal.         Judgment: Judgment normal.          Assessment:  Follow up    H/O urinary infection  -     POCT URINALYSIS W/O SCOPE    Vaginal atrophy          ICD-10-CM ICD-9-CM    1. Follow up  Z09 V67.9    2. H/O urinary infection  Z87.440 V13.02 POCT URINALYSIS W/O SCOPE   3. Vaginal atrophy  N95.2 627.3        Plan:  U/A collected, will call with results  Encouraged continued use of vag cream for vag atrophy    Follow up if symptoms worsen or fail to improve.

## 2022-03-23 ENCOUNTER — TELEPHONE (OUTPATIENT)
Dept: OBSTETRICS AND GYNECOLOGY | Facility: CLINIC | Age: 77
End: 2022-03-23
Payer: MEDICARE

## 2022-03-23 NOTE — TELEPHONE ENCOUNTER
----- Message from Zabrina Samuel CNM sent at 3/23/2022  4:55 PM CDT -----  Advise her urinalysis was negative. Continue using estrace cream twice weekly. Follow up as needed.

## 2022-03-23 NOTE — PROGRESS NOTES
Advise her urinalysis was negative. Continue using estrace cream twice weekly. Follow up as needed.

## 2022-04-06 NOTE — PROGRESS NOTES
Subjective:       Patient ID: Kailee Malone is a 77 y.o. female.    Chief Complaint:  No chief complaint on file.      History of Present Illness  This pleasant 77 year old right handed  female presents to the clinic with  as a new patient referral from [facility] for tremors. Patient states tremors started 20 years ago and have become worse in the last 4 to 5 months. She states she can drink from a cup, eat with a fork but sometimes does better with a spoon, she can sometimes use a screw , she can put on her make up and reports decreased hand . She states her hand witting is horrible. She denies diabetes but has anxiety and depression that is treated with prozac. She states she was tested for ROMINA two years ago and test results were negative. She states tremors are worse at rest and better with activity. She denies social embarrassment or functional disability from the tremors. PMH includes tremors, cervix cancer, HTN, RLS, GERDs, and Kirkpatrick's Esophagus. Family history includes father with tremors, DM, CHF and cancer. She denies smoking or drinking alcohol. She states she had a total hysterectomy 25 years ago. She is currently on Propranolol 40 mg daily and is tolerating this medication without side effects. Discussed the plan in detail with Neurologist Dr. MOY Domínguez, the patient and  and they are in agreement with the plan. All their questions were answered at today's visit.      Review of Systems  Review of Systems   Constitutional: Negative for activity change, diaphoresis, fever and unexpected weight change.   HENT: Negative for congestion, ear pain, facial swelling, hearing loss, tinnitus, trouble swallowing and voice change.    Eyes: Negative for photophobia, pain and visual disturbance.   Respiratory: Negative for chest tightness, shortness of breath and wheezing.    Cardiovascular: Negative for chest pain, palpitations and leg swelling.   Gastrointestinal: Negative for  constipation, diarrhea, nausea and vomiting.   Genitourinary: Negative for difficulty urinating.   Musculoskeletal: Negative for back pain, gait problem, neck pain and neck stiffness.   Skin: Negative for color change, pallor, rash and wound.   Neurological: Positive for tremors. Negative for dizziness, seizures, syncope, facial asymmetry, speech difficulty, weakness, light-headedness, numbness and headaches.   Psychiatric/Behavioral: Negative for agitation, behavioral problems, confusion, decreased concentration and hallucinations. The patient is not nervous/anxious and is not hyperactive.        Objective:      Neurologic Exam     Mental Status   Oriented to person, place, and time.   Oriented to person.   Oriented to place.   Oriented to time.   Attention: normal. Concentration: normal.   Speech: speech is normal   Level of consciousness: alert  Knowledge: good.     Cranial Nerves     CN II   Visual fields full to confrontation.     CN III, IV, VI   Pupils are equal, round, and reactive to light.  Extraocular motions are normal.   Right pupil: Size: 3 mm. Shape: regular. Reactivity: brisk.   Left pupil: Size: 3 mm. Shape: regular. Reactivity: brisk.   CN III: no CN III palsy  CN VI: no CN VI palsy    CN V   Facial sensation intact.     CN VII   Facial expression full, symmetric.     CN VIII   CN VIII normal.   Hearing: intact    CN IX, X   CN IX normal.   CN X normal.     CN XI   CN XI normal.     CN XII   CN XII normal.     Motor Exam   Muscle bulk: normal  Overall muscle tone: normal  Right arm pronator drift: absent  Left arm pronator drift: absent    Strength   Right deltoid: 5/5  Left deltoid: 5/5  Right biceps: 5/5  Left biceps: 5/5  Right triceps: 5/5  Left triceps: 5/5  Right quadriceps: 5/5  Left quadriceps: 5/5  Right hamstrin/5  Left hamstrin/5    Sensory Exam   Light touch normal.   Vibration normal.   Proprioception normal.   Pinprick normal.     Gait, Coordination, and Reflexes      Gait  Gait: normal    Coordination   Romberg: positive  Finger to nose coordination: normal    Tremor   Resting tremor: present  Intention tremor: absent  Action tremor: left arm and right arm    Reflexes   Right brachioradialis: 2+  Left brachioradialis: 2+  Right biceps: 2+  Left biceps: 2+  Right triceps: 2+  Left triceps: 2+  Right patellar: 2+  Left patellar: 2+  Right achilles: 2+  Left achilles: 2+  Right : 4+  Left : 4+  Right Leung: absent  Left Leung: absent      Physical Exam  Constitutional:       General: She is not in acute distress.  HENT:      Head: Normocephalic.      Nose: Nose normal.      Mouth/Throat:      Mouth: Mucous membranes are moist.   Eyes:      Extraocular Movements: Extraocular movements intact and EOM normal.      Pupils: Pupils are equal, round, and reactive to light.   Cardiovascular:      Rate and Rhythm: Normal rate and regular rhythm.      Heart sounds: Normal heart sounds. No murmur heard.  Pulmonary:      Effort: Pulmonary effort is normal. No respiratory distress.      Breath sounds: Normal breath sounds. No wheezing, rhonchi or rales.   Musculoskeletal:         General: No swelling, tenderness, deformity or signs of injury. Normal range of motion.      Cervical back: Normal range of motion. No rigidity or tenderness.      Right lower leg: No edema.      Left lower leg: No edema.   Skin:     General: Skin is warm and dry.      Capillary Refill: Capillary refill takes less than 2 seconds.      Coloration: Skin is not jaundiced or pale.      Findings: No bruising, erythema, lesion or rash.   Neurological:      Mental Status: She is alert and oriented to person, place, and time.      Coordination: Romberg Test abnormal. Finger-Nose-Finger Test normal.      Gait: Gait is intact.      Deep Tendon Reflexes:      Reflex Scores:       Tricep reflexes are 2+ on the right side and 2+ on the left side.       Bicep reflexes are 2+ on the right side and 2+ on the left  side.       Brachioradialis reflexes are 2+ on the right side and 2+ on the left side.       Patellar reflexes are 2+ on the right side and 2+ on the left side.       Achilles reflexes are 2+ on the right side and 2+ on the left side.  Psychiatric:         Mood and Affect: Mood normal.         Speech: Speech normal.         Behavior: Behavior normal.           Assessment:     Problem List Items Addressed This Visit        Neuro    Tremors of nervous system - Primary    Relevant Medications    propranoloL (INDERAL) 60 MG tablet      Other Visit Diagnoses     On long term drug therapy        Relevant Orders    CBC Auto Differential (Completed)    Comprehensive Metabolic Panel    Vitamin B12 & Folate    TSH    T4, Free    Vitamin D            Plan:       1. MRI of the brain with and without contrast to evaluate tremors  2. Labs: cbc, cmp, b12, folate, tsh, free t , vit d  3. Renew and increase propranolol 60 mg one tablet daily, monitor heart rate and blood pressure before taking   4. Consider ROLAND scan in the future if needed  5. Follow up with Neurology in 3 months or sooner if needed

## 2022-04-11 ENCOUNTER — OFFICE VISIT (OUTPATIENT)
Dept: NEUROLOGY | Facility: CLINIC | Age: 77
End: 2022-04-11
Payer: MEDICARE

## 2022-04-11 VITALS
SYSTOLIC BLOOD PRESSURE: 118 MMHG | HEART RATE: 69 BPM | BODY MASS INDEX: 31.32 KG/M2 | OXYGEN SATURATION: 94 % | WEIGHT: 188 LBS | DIASTOLIC BLOOD PRESSURE: 70 MMHG | HEIGHT: 65 IN

## 2022-04-11 DIAGNOSIS — Z79.899 ON LONG TERM DRUG THERAPY: ICD-10-CM

## 2022-04-11 DIAGNOSIS — R25.1 TREMOR: Primary | ICD-10-CM

## 2022-04-11 PROBLEM — G47.61 PERIODIC LIMB MOVEMENT DISORDER: Status: ACTIVE | Noted: 2022-04-11

## 2022-04-11 PROBLEM — G47.30 SLEEP APNEA: Status: ACTIVE | Noted: 2022-04-11

## 2022-04-11 PROBLEM — F32.A DEPRESSION: Status: ACTIVE | Noted: 2022-04-11

## 2022-04-11 PROBLEM — E83.10 DISORDER OF IRON METABOLISM: Status: ACTIVE | Noted: 2022-04-11

## 2022-04-11 PROBLEM — E66.3 OVERWEIGHT: Status: ACTIVE | Noted: 2022-04-11

## 2022-04-11 PROBLEM — M81.0 OSTEOPOROSIS: Status: ACTIVE | Noted: 2022-04-11

## 2022-04-11 PROBLEM — I10 HYPERTENSION: Status: ACTIVE | Noted: 2022-04-11

## 2022-04-11 PROBLEM — Z85.41 HISTORY OF MALIGNANT NEOPLASM OF CERVIX: Status: ACTIVE | Noted: 2022-04-11

## 2022-04-11 PROCEDURE — 99215 OFFICE O/P EST HI 40 MIN: CPT | Mod: PBBFAC | Performed by: NURSE PRACTITIONER

## 2022-04-11 PROCEDURE — 99214 PR OFFICE/OUTPT VISIT, EST, LEVL IV, 30-39 MIN: ICD-10-PCS | Mod: S$PBB,,, | Performed by: NURSE PRACTITIONER

## 2022-04-11 PROCEDURE — 99214 OFFICE O/P EST MOD 30 MIN: CPT | Mod: S$PBB,,, | Performed by: NURSE PRACTITIONER

## 2022-04-11 RX ORDER — PROPRANOLOL HYDROCHLORIDE 60 MG/1
TABLET ORAL
Qty: 90 TABLET | Refills: 3 | Status: SHIPPED | OUTPATIENT
Start: 2022-04-11 | End: 2022-07-25

## 2022-04-11 RX ORDER — CYCLOBENZAPRINE HCL 5 MG
5 TABLET ORAL 3 TIMES DAILY PRN
COMMUNITY
Start: 2022-02-15 | End: 2023-01-18

## 2022-04-11 NOTE — PATIENT INSTRUCTIONS
MRI of the brain with and without contrast to evaluate tremors  Labs: cbc, cmp, b12, folate, tsh, free t , vit d  Renew and increase propranolol 60 mg one tablet daily, monitor heart rate and blood pressure before taking   Consider ROLAND scan in the future if needed  Follow up with Neurology in 3 months or sooner if needed

## 2022-04-12 ENCOUNTER — HOSPITAL ENCOUNTER (OUTPATIENT)
Dept: RADIOLOGY | Facility: HOSPITAL | Age: 77
Discharge: HOME | End: 2022-04-12
Attending: NURSE PRACTITIONER
Payer: MEDICARE

## 2022-04-12 DIAGNOSIS — R25.1 TREMOR: ICD-10-CM

## 2022-04-12 DIAGNOSIS — E55.9 VITAMIN D DEFICIENCY: Primary | ICD-10-CM

## 2022-04-12 PROCEDURE — 70553 MRI BRAIN STEM W/O & W/DYE: CPT | Mod: TC

## 2022-04-12 PROCEDURE — 25500020 PHARM REV CODE 255: Performed by: NURSE PRACTITIONER

## 2022-04-12 PROCEDURE — 70553 MRI BRAIN W WO CONTRAST: ICD-10-PCS | Mod: 26,,, | Performed by: STUDENT IN AN ORGANIZED HEALTH CARE EDUCATION/TRAINING PROGRAM

## 2022-04-12 PROCEDURE — 70553 MRI BRAIN STEM W/O & W/DYE: CPT | Mod: 26,,, | Performed by: STUDENT IN AN ORGANIZED HEALTH CARE EDUCATION/TRAINING PROGRAM

## 2022-04-12 PROCEDURE — A9575 INJ GADOTERATE MEGLUMI 0.1ML: HCPCS | Performed by: NURSE PRACTITIONER

## 2022-04-12 RX ORDER — GADOTERATE MEGLUMINE 376.9 MG/ML
17 INJECTION INTRAVENOUS
Status: COMPLETED | OUTPATIENT
Start: 2022-04-12 | End: 2022-04-12

## 2022-04-12 RX ORDER — ERGOCALCIFEROL 1.25 MG/1
CAPSULE ORAL
Qty: 12 CAPSULE | Refills: 1 | Status: SHIPPED | OUTPATIENT
Start: 2022-04-12 | End: 2023-02-23 | Stop reason: SDUPTHER

## 2022-04-12 RX ADMIN — GADOTERATE MEGLUMINE 17 ML: 376.9 INJECTION, SOLUTION INTRAVENOUS at 10:04

## 2022-04-13 ENCOUNTER — TELEPHONE (OUTPATIENT)
Dept: NEUROLOGY | Facility: CLINIC | Age: 77
End: 2022-04-13
Payer: MEDICARE

## 2022-04-13 DIAGNOSIS — R10.13 EPIGASTRIC PAIN: ICD-10-CM

## 2022-04-13 RX ORDER — LANSOPRAZOLE 30 MG/1
30 CAPSULE, DELAYED RELEASE ORAL DAILY
Qty: 90 CAPSULE | Refills: 3 | Status: SHIPPED | OUTPATIENT
Start: 2022-04-13 | End: 2023-01-18 | Stop reason: SDUPTHER

## 2022-04-13 NOTE — TELEPHONE ENCOUNTER
Pt voiced understanding and wants lab results sent to pcp Dr Angelito Mayorga  ----- Message from Ferny Melendez NP sent at 4/12/2022  8:31 AM CDT -----  Please let patient know her sodium is slightly low, BUN is elevated, vit d is low, wbc slightly elevated. I have sent her in some vit d to her pharmacy with instructions on the bottle. Have her follow up with PCP for the other abnormal labs, thanks

## 2022-04-13 NOTE — TELEPHONE ENCOUNTER
Patient called stating she needs refills of her PPI. Patient states she is at pharmacy now. Last EGD was 02/03/2022

## 2022-06-29 ENCOUNTER — OFFICE VISIT (OUTPATIENT)
Dept: FAMILY MEDICINE | Facility: CLINIC | Age: 77
End: 2022-06-29
Payer: MEDICARE

## 2022-06-29 VITALS
SYSTOLIC BLOOD PRESSURE: 110 MMHG | OXYGEN SATURATION: 96 % | TEMPERATURE: 97 F | DIASTOLIC BLOOD PRESSURE: 72 MMHG | HEIGHT: 65 IN | WEIGHT: 190 LBS | HEART RATE: 66 BPM | RESPIRATION RATE: 18 BRPM | BODY MASS INDEX: 31.65 KG/M2

## 2022-06-29 DIAGNOSIS — J02.9 SORE THROAT: ICD-10-CM

## 2022-06-29 DIAGNOSIS — J04.0 LARYNGITIS: ICD-10-CM

## 2022-06-29 DIAGNOSIS — R05.9 COUGH: Primary | ICD-10-CM

## 2022-06-29 LAB
CTP QC/QA: YES
FLUAV AG NPH QL: NEGATIVE
FLUBV AG NPH QL: NEGATIVE
SARS-COV-2 AG RESP QL IA.RAPID: NEGATIVE

## 2022-06-29 PROCEDURE — 96372 PR INJECTION,THERAP/PROPH/DIAG2ST, IM OR SUBCUT: ICD-10-PCS | Mod: ,,, | Performed by: FAMILY MEDICINE

## 2022-06-29 PROCEDURE — 99213 PR OFFICE/OUTPT VISIT, EST, LEVL III, 20-29 MIN: ICD-10-PCS | Mod: ,,, | Performed by: FAMILY MEDICINE

## 2022-06-29 PROCEDURE — 96372 THER/PROPH/DIAG INJ SC/IM: CPT | Mod: ,,, | Performed by: FAMILY MEDICINE

## 2022-06-29 PROCEDURE — 87428 SARSCOV & INF VIR A&B AG IA: CPT | Mod: RHCUB | Performed by: FAMILY MEDICINE

## 2022-06-29 PROCEDURE — 99213 OFFICE O/P EST LOW 20 MIN: CPT | Mod: ,,, | Performed by: FAMILY MEDICINE

## 2022-06-29 RX ORDER — AZITHROMYCIN 250 MG/1
TABLET, FILM COATED ORAL
Qty: 6 TABLET | Refills: 0 | Status: SHIPPED | OUTPATIENT
Start: 2022-06-29 | End: 2022-07-04

## 2022-06-29 RX ORDER — METHYLPREDNISOLONE ACETATE 40 MG/ML
40 INJECTION, SUSPENSION INTRA-ARTICULAR; INTRALESIONAL; INTRAMUSCULAR; SOFT TISSUE
Status: COMPLETED | OUTPATIENT
Start: 2022-06-29 | End: 2022-06-29

## 2022-06-29 RX ORDER — LINCOMYCIN HYDROCHLORIDE 300 MG/ML
600 INJECTION, SOLUTION INTRAMUSCULAR; INTRAVENOUS; SUBCONJUNCTIVAL
Status: COMPLETED | OUTPATIENT
Start: 2022-06-29 | End: 2022-06-29

## 2022-06-29 RX ADMIN — METHYLPREDNISOLONE ACETATE 40 MG: 40 INJECTION, SUSPENSION INTRA-ARTICULAR; INTRALESIONAL; INTRAMUSCULAR; SOFT TISSUE at 02:06

## 2022-06-29 RX ADMIN — LINCOMYCIN HYDROCHLORIDE 600 MG: 300 INJECTION, SOLUTION INTRAMUSCULAR; INTRAVENOUS; SUBCONJUNCTIVAL at 02:06

## 2022-06-29 NOTE — ASSESSMENT & PLAN NOTE
Patient has acute laryngitis.  Will treat her with the Lincocin as well as Depo-Medrol IM.  Will also give her Zithromax Dosepak.  She is to use Mucinex DM for cough.  Tylenol for pain or fever.  Follow-up on a p.r.n. basis.  Patient's strep screen COVID test and flu test were all negative

## 2022-06-29 NOTE — PROGRESS NOTES
Jac Crowley DO   [address]  [place]  [address]      PATIENT NAME: Kailee Malone  : 1945  DATE: 22  MRN: [id number]      Billing Provider: Jac Crowley DO  Level of Service:   Patient PCP Information     Provider PCP Type    Caden Bradford MD General          Reason for Visit / Chief Complaint: Cough (Pt states symptoms started last night ), Nasal Congestion (Pt state when laying down has drainage that starts her cough. ), and Sore Throat (Pt states sore throat started last night. )       Update PCP  Update Chief Complaint         History of Present Illness / Problem Focused Workflow     Kailee Malone presents to the clinic with Cough (Pt states symptoms started last night ), Nasal Congestion (Pt state when laying down has drainage that starts her cough. ), and Sore Throat (Pt states sore throat started last night. )     Patient reports 2 days of cough with the sore throat as her  has been sick for 4 days.  She also has some hoarseness associated with this.  She denies any loss of taste or smell.  Her immunizations are current.    Cough  Associated symptoms include a sore throat. Pertinent negatives include no chest pain, chills, ear pain, eye redness, fever, headaches, myalgias, postnasal drip, rash, shortness of breath or wheezing. There is no history of environmental allergies.   Sore Throat   Associated symptoms include coughing. Pertinent negatives include no abdominal pain, congestion, diarrhea, ear discharge, ear pain, headaches, neck pain, shortness of breath or vomiting.       Review of Systems     Review of Systems   Constitutional: Negative for activity change, appetite change, chills, fatigue and fever.   HENT: Positive for sore throat. Negative for nasal congestion, ear discharge, ear pain, mouth dryness, mouth sores, postnasal drip, sinus pressure/congestion and voice change.    Eyes: Negative for pain, discharge, redness,  itching and visual disturbance.   Respiratory: Positive for cough. Negative for apnea, chest tightness, shortness of breath and wheezing.    Cardiovascular: Negative for chest pain, palpitations and leg swelling.   Gastrointestinal: Negative for abdominal distention, abdominal pain, anal bleeding, blood in stool, change in bowel habit, constipation, diarrhea, nausea, vomiting, reflux and change in bowel habit.   Endocrine: Negative for cold intolerance, heat intolerance, polydipsia, polyphagia and polyuria.   Genitourinary: Negative for difficulty urinating, enuresis, frequency, genital sores, hematuria, hot flashes, menstrual irregularity, urgency and vaginal dryness.   Musculoskeletal: Negative for arthralgias, back pain, gait problem, leg pain, myalgias and neck pain.   Integumentary:  Negative for rash, mole/lesion, breast mass, breast discharge and breast tenderness.   Allergic/Immunologic: Negative for environmental allergies and food allergies.   Neurological: Negative for dizziness, vertigo, tremors, seizures, syncope, facial asymmetry, speech difficulty, weakness, light-headedness, numbness, headaches, disturbances in coordination, memory loss and coordination difficulties.   Hematological: Negative for adenopathy. Does not bruise/bleed easily.   Psychiatric/Behavioral: Negative for agitation, behavioral problems, confusion, decreased concentration, dysphoric mood, hallucinations, self-injury, sleep disturbance and suicidal ideas. The patient is not nervous/anxious and is not hyperactive.    Breast: Negative for mass and tenderness      Medical / Social / Family History     Past Medical History:   Diagnosis Date    Acute gastric ulcer without hemorrhage or perforation 2/3/2022    Acute superficial gastritis without hemorrhage 2/3/2022    Kirkpatrick's esophagus without dysplasia 2/4/2022    Diverticula, colon 10/4/2021    HH (hiatus hernia) 2/3/2022    History of colon polyps 10/4/2021    Hypertension      Screening for colon cancer 10/4/2021       Past Surgical History:   Procedure Laterality Date    ARTHROSCOPY OF KNEE Right 5/24/2021    Procedure: ARTHROSCOPY, KNEE;  Surgeon: Kenny Valenzuela III, MD;  Location: [facility];  Service: Orthopedics;  Laterality: Right;    BREAST BIOPSY      BREAST SURGERY      lumpectomy    HYSTERECTOMY      KNEE ARTHROSCOPY W/ MENISCECTOMY Right 5/24/2021    Procedure: ARTHROSCOPY, KNEE, WITH MENISCECTOMY;  Surgeon: Kenny Valenzuela III, MD;  Location: [facility];  Service: Orthopedics;  Laterality: Right;  medial menisectomy    RT WRIST         Social History  Ms.  reports that she has never smoked. She has never used smokeless tobacco. She reports that she does not drink alcohol and does not use drugs.    Family History  Ms.'s family history includes Breast cancer in her maternal aunt and mother.    Medications and Allergies     Medications  Outpatient Medications Marked as Taking for the 6/29/22 encounter (Office Visit) with Jac Crowley DO   Medication Sig Dispense Refill    alendronate (FOSAMAX) 70 MG tablet Take 70 mg by mouth every 7 days.      cyclobenzaprine (FLEXERIL) 5 MG tablet Take 5 mg by mouth 3 (three) times daily as needed.      ergocalciferol (ERGOCALCIFEROL) 50,000 unit Cap Take one capsule weekly for 12 weeks then reduce to one capsule monthly 12 capsule 1    estradioL (ESTRACE) 0.01 % (0.1 mg/gram) vaginal cream Place 1 g vaginally once daily. 42.5 g 1    FLUoxetine 20 MG capsule Take 20 mg by mouth once daily.      lansoprazole (PREVACID) 30 MG capsule Take 1 capsule (30 mg total) by mouth once daily. 90 capsule 3    propranoloL (INDERAL) 60 MG tablet Take one tablet daily 90 tablet 3     Current Facility-Administered Medications for the 6/29/22 encounter (Office Visit) with Jac Crowley DO   Medication Dose Route Frequency Provider Last Rate Last Admin    [COMPLETED] lincomycin injection 600 mg  600 mg Intramuscular 1  time in Clinic/HOD Jac Crowley, DO   600 mg at 06/29/22 1430    [COMPLETED] methylPREDNISolone acetate injection 40 mg  40 mg Intramuscular 1 time in Clinic/HOD Jac Crowley, DO   40 mg at 06/29/22 1430       Allergies  Review of patient's allergies indicates:   Allergen Reactions    Betadine prepstick [povidone-iodine-ethyl alcohol]     Penicillins        Physical Examination     Vitals:    06/29/22 1320   BP: 110/72   Pulse: 66   Resp: 18   Temp: 97.1 °F (36.2 °C)     Physical Exam  Vitals reviewed.   Constitutional:       General: She is not in acute distress.     Appearance: Normal appearance. She is obese.   HENT:      Head: Normocephalic and atraumatic.      Right Ear: Tympanic membrane normal.      Left Ear: Tympanic membrane normal.      Nose: Congestion present.      Mouth/Throat:      Mouth: Mucous membranes are moist.      Pharynx: Oropharynx is clear. No oropharyngeal exudate or posterior oropharyngeal erythema.   Eyes:      General: No scleral icterus.     Extraocular Movements: Extraocular movements intact.      Conjunctiva/sclera: Conjunctivae normal.      Pupils: Pupils are equal, round, and reactive to light.   Neck:      Vascular: No carotid bruit.   Cardiovascular:      Rate and Rhythm: Normal rate and regular rhythm.      Pulses: Normal pulses.      Heart sounds: Normal heart sounds. No murmur heard.    No gallop.   Pulmonary:      Effort: Pulmonary effort is normal. No respiratory distress.      Breath sounds: Normal breath sounds. No wheezing.   Abdominal:      General: Abdomen is flat. Bowel sounds are normal.      Palpations: Abdomen is soft.   Musculoskeletal:         General: Normal range of motion.      Cervical back: Normal range of motion and neck supple.      Right lower leg: No edema.      Left lower leg: No edema.   Lymphadenopathy:      Cervical: No cervical adenopathy.   Skin:     General: Skin is warm and dry.      Capillary Refill: Capillary refill takes less than 2  seconds.      Coloration: Skin is not jaundiced.      Findings: No lesion or rash.   Neurological:      General: No focal deficit present.      Mental Status: She is alert and oriented to person, place, and time. Mental status is at baseline.      Cranial Nerves: No cranial nerve deficit.      Sensory: No sensory deficit.      Motor: No weakness.      Coordination: Coordination normal.      Gait: Gait normal.      Deep Tendon Reflexes: Reflexes normal.   Psychiatric:         Mood and Affect: Mood normal.         Behavior: Behavior normal.         Thought Content: Thought content normal.         Judgment: Judgment normal.               Lab Results   Component Value Date    WBC 11.96 (H) 04/11/2022    HGB 13.0 04/11/2022    HCT 40.9 04/11/2022    MCV 93.4 04/11/2022     04/11/2022          Sodium   Date Value Ref Range Status   04/11/2022 134 (L) 136 - 145 mmol/L Final     Potassium   Date Value Ref Range Status   04/11/2022 4.2 3.5 - 5.1 mmol/L Final     Chloride   Date Value Ref Range Status   04/11/2022 97 (L) 98 - 107 mmol/L Final     CO2   Date Value Ref Range Status   04/11/2022 29 21 - 32 mmol/L Final     Glucose   Date Value Ref Range Status   04/11/2022 87 74 - 106 mg/dL Final     BUN   Date Value Ref Range Status   04/11/2022 20 (H) 7 - 18 mg/dL Final     Creatinine   Date Value Ref Range Status   04/11/2022 0.71 0.55 - 1.02 mg/dL Final     Calcium   Date Value Ref Range Status   04/11/2022 9.6 8.5 - 10.1 mg/dL Final     Total Protein   Date Value Ref Range Status   04/11/2022 7.6 6.4 - 8.2 g/dL Final     Albumin   Date Value Ref Range Status   04/11/2022 3.9 3.5 - 5.0 g/dL Final     Bilirubin, Total   Date Value Ref Range Status   04/11/2022 0.4 0.0 - 1.2 mg/dL Final     Alk Phos   Date Value Ref Range Status   04/11/2022 33 (L) 55 - 142 U/L Final     AST   Date Value Ref Range Status   04/11/2022 30 15 - 37 U/L Final     ALT   Date Value Ref Range Status   04/11/2022 50 13 - 56 U/L Final     Anion  Gap   Date Value Ref Range Status   04/11/2022 12 7 - 16 mmol/L Final     eGFR   Date Value Ref Range Status   04/11/2022 85 >=60 mL/min/1.73m² Final      MRI Brain W WO Contrast  Narrative: EXAMINATION:  MRI BRAIN W WO CONTRAST    CLINICAL HISTORY:  tremor;.  Tremor, unspecified    TECHNIQUE:  Multiplanar multisequence MR imaging of the brain was performed before and after the administration of intravenous contrast.    COMPARISON:  2018    FINDINGS:  Intracranial compartment:    Ventricles and sulci are normal in size for age without evidence of hydrocephalus. No extra-axial blood or fluid collections.    Nonspecific T2/T2 FLAIR hyperintensities, probably chronic small-vessel image changes.  No mass lesion, acute hemorrhage, edema or acute infarct. No abnormal enhancement.    Intracranial vasculature is patent.    Skull/extracranial contents (limited evaluation): Bone marrow signal intensity is normal.  Impression: No acute abnormality    Electronically signed by: Jake Laureano  Date:    04/12/2022  Time:    10:25     Procedures   Assessment and Plan (including Health Maintenance)      Problem List  Smart Sets  Document Outside HM   :    Plan:         Health Maintenance Due   Topic Date Due    Hepatitis C Screening  Never done    Lipid Panel  Never done    TETANUS VACCINE  Never done    DEXA Scan  Never done    Shingles Vaccine (1 of 2) Never done    Pneumococcal Vaccines (Age 65+) (1 - PCV) Never done    COVID-19 Vaccine (2 - Moderna series) 03/11/2021       Problem List Items Addressed This Visit        ENT    Laryngitis    Current Assessment & Plan     Patient has acute laryngitis.  Will treat her with the Lincocin as well as Depo-Medrol IM.  Will also give her Zithromax Dosepak.  She is to use Mucinex DM for cough.  Tylenol for pain or fever.  Follow-up on a p.r.n. basis.  Patient's strep screen COVID test and flu test were all negative           Relevant Medications    methylPREDNISolone acetate  injection 40 mg (Completed)    lincomycin injection 600 mg (Completed)    azithromycin (Z-RASHEEDA) 250 MG tablet      Other Visit Diagnoses     Cough    -  Primary    Relevant Orders    POCT SARS-COV2 (COVID) with Flu Antigen (Completed)    Sore throat        Relevant Orders    POCT SARS-COV2 (COVID) with Flu Antigen (Completed)          Health Maintenance Topics with due status: Not Due       Topic Last Completion Date    Colonoscopy 10/04/2021    Influenza Vaccine Not Due       Future Appointments   Date Time Provider Department Center   7/18/2022  9:30 AM [facility] US1 OB USIC [facility]   7/18/2022 10:30 AM [facility]   7/25/2022 10:30 AM Ferny Melendez NP OB NEURO [facility]   8/15/2022  9:00 AM [facility] [id number] [facility]        Follow up in about 3 months (around 9/29/2022), or if symptoms worsen or fail to improve.     Signature:  DO Ceferino Albrecht [address] [place]  [address]    Date of encounter: 6/29/22

## 2022-07-18 ENCOUNTER — HOSPITAL ENCOUNTER (OUTPATIENT)
Dept: RADIOLOGY | Facility: HOSPITAL | Age: 77
Discharge: HOME | End: 2022-07-18
Attending: NURSE PRACTITIONER
Payer: MEDICARE

## 2022-07-18 ENCOUNTER — OFFICE VISIT (OUTPATIENT)
Dept: GASTROENTEROLOGY | Facility: CLINIC | Age: 77
End: 2022-07-18
Payer: MEDICARE

## 2022-07-18 VITALS
HEIGHT: 65 IN | WEIGHT: 183.81 LBS | BODY MASS INDEX: 30.62 KG/M2 | DIASTOLIC BLOOD PRESSURE: 88 MMHG | SYSTOLIC BLOOD PRESSURE: 127 MMHG | HEART RATE: 80 BPM | OXYGEN SATURATION: 97 %

## 2022-07-18 DIAGNOSIS — K22.70 BARRETT'S ESOPHAGUS WITHOUT DYSPLASIA: ICD-10-CM

## 2022-07-18 DIAGNOSIS — K44.9 HH (HIATUS HERNIA): ICD-10-CM

## 2022-07-18 DIAGNOSIS — K76.0 FATTY LIVER: Primary | ICD-10-CM

## 2022-07-18 DIAGNOSIS — K76.0 FATTY LIVER: ICD-10-CM

## 2022-07-18 PROCEDURE — 76705 ECHO EXAM OF ABDOMEN: CPT | Mod: TC

## 2022-07-18 PROCEDURE — 76705 US ABDOMEN LIMITED: ICD-10-PCS | Mod: 26,,, | Performed by: RADIOLOGY

## 2022-07-18 PROCEDURE — 76705 ECHO EXAM OF ABDOMEN: CPT | Mod: 26,,, | Performed by: RADIOLOGY

## 2022-07-18 PROCEDURE — 99214 OFFICE O/P EST MOD 30 MIN: CPT | Mod: ,,, | Performed by: NURSE PRACTITIONER

## 2022-07-18 PROCEDURE — 99214 PR OFFICE/OUTPT VISIT, EST, LEVL IV, 30-39 MIN: ICD-10-PCS | Mod: ,,, | Performed by: NURSE PRACTITIONER

## 2022-07-18 RX ORDER — PSEUDOEPHEDRINE HYDROCHLORIDE 60 MG/1
60 TABLET ORAL 3 TIMES DAILY PRN
COMMUNITY
Start: 2022-07-16 | End: 2023-01-18

## 2022-07-18 RX ORDER — DOXYCYCLINE 100 MG/1
100 CAPSULE ORAL 2 TIMES DAILY
COMMUNITY
Start: 2022-07-16 | End: 2023-01-18

## 2022-07-18 NOTE — PROGRESS NOTES
Kailee Malone is a 77 y.o. female here for Follow-up        PCP: Caden Bradford  Referring Provider: No referring provider defined for this encounter.     HPI:  Presents for follow up appointment due to fatty liver. Liver ultrasound this am reviewed. Fatty liver and also mild gallbladder sludge is noted. Reports that recent liver enzymes at [facility] were normal. Records are not available. States that she has had multiple recent medical problems including walking pneumonia, sinusitis and a visit to the ER at [facility] for chest discomfort that radiated to her back. [facility] records are not available.EGD 2/3/22. She did have a non-bleeding pre prepyloric ulcer. Also, positive for Kirkpatrick's esophagus. She is taking Prevacid. Reports lack of desire for food. No hematochezia or melena. Is taking antibiotics at this time. Also reports that she has recently had COVID. Patient has a fatty lipoma on her abdomen and also on her right lower leg. Discussed referral to a surgeon for evaluation. She declines at this time.        ROS:  Review of Systems   Constitutional: Positive for appetite change. Negative for fatigue, fever and unexpected weight change.   HENT: Negative for trouble swallowing.    Respiratory: Negative for shortness of breath.    Cardiovascular: Negative for chest pain.   Gastrointestinal: Positive for reflux. Negative for abdominal pain, blood in stool, change in bowel habit, constipation, diarrhea, nausea, vomiting and change in bowel habit.   Musculoskeletal: Negative for back pain, gait problem and joint swelling.   Integumentary:  Negative for pallor.   Neurological: Negative for dizziness and light-headedness.   Hematological: Does not bruise/bleed easily.   Psychiatric/Behavioral: The patient is not nervous/anxious.           PMHX:  has a past medical history of Acute gastric ulcer without hemorrhage or perforation (2/3/2022), Acute superficial gastritis without hemorrhage (2/3/2022), Kirkpatrick's  "esophagus without dysplasia (2/4/2022), Diverticula, colon (10/4/2021), HH (hiatus hernia) (2/3/2022), History of colon polyps (10/4/2021), Hypertension, and Screening for colon cancer (10/4/2021).    PSHX:  has a past surgical history that includes RT WRIST; Hysterectomy; Breast surgery; Arthroscopy of knee (Right, 5/24/2021); Knee arthroscopy w/ meniscectomy (Right, 5/24/2021); and Breast biopsy.    PFHX: family history includes Breast cancer in her maternal aunt and mother.    PSlHX:  reports that she has never smoked. She has never used smokeless tobacco. She reports that she does not drink alcohol and does not use drugs.        Review of patient's allergies indicates:   Allergen Reactions    Betadine prepstick [povidone-iodine-ethyl alcohol]     Penicillins        Medication List with Changes/Refills   Current Medications    ALENDRONATE (FOSAMAX) 70 MG TABLET    Take 70 mg by mouth every 7 days.    CYCLOBENZAPRINE (FLEXERIL) 5 MG TABLET    Take 5 mg by mouth 3 (three) times daily as needed.    DOXYCYCLINE (VIBRAMYCIN) 100 MG CAP    Take 100 mg by mouth 2 (two) times daily.    ERGOCALCIFEROL (ERGOCALCIFEROL) 50,000 UNIT CAP    Take one capsule weekly for 12 weeks then reduce to one capsule monthly    ESTRADIOL (ESTRACE) 0.01 % (0.1 MG/GRAM) VAGINAL CREAM    Place 1 g vaginally once daily.    FLUOXETINE 20 MG CAPSULE    Take 20 mg by mouth once daily.    KETOCONAZOLE (NIZORAL) 2 % CREAM    APPLY TO AFFECTED AREAS TWICE DAILY UNTIL CLEAR    LANSOPRAZOLE (PREVACID) 30 MG CAPSULE    Take 1 capsule (30 mg total) by mouth once daily.    PROPRANOLOL (INDERAL) 60 MG TABLET    Take one tablet daily    SUDOGEST 60 MG TABLET    Take 60 mg by mouth 3 (three) times daily as needed.    TRIAMTERENE-HYDROCHLOROTHIAZIDE 37.5-25 MG (MAXZIDE-25) 37.5-25 MG PER TABLET    Take 1 tablet by mouth once daily.        Objective Findings:  Vital Signs:  /88   Pulse 80   Ht 5' 5" (1.651 m)   Wt 83.4 kg (183 lb 12.8 oz)   SpO2 " 97%   BMI 30.59 kg/m²  Body mass index is 30.59 kg/m².    Physical Exam:  Physical Exam  Vitals and nursing note reviewed.   Constitutional:       General: She is not in acute distress.     Appearance: Normal appearance. She is not ill-appearing.   HENT:      Mouth/Throat:      Mouth: Mucous membranes are moist.   Cardiovascular:      Rate and Rhythm: Normal rate.   Pulmonary:      Breath sounds: No wheezing, rhonchi or rales.   Abdominal:      General: Bowel sounds are normal. There is no distension.      Palpations: Abdomen is soft. There is no mass.      Tenderness: There is no abdominal tenderness. There is no guarding or rebound.      Hernia: No hernia is present.   Skin:     General: Skin is warm and dry.      Coloration: Skin is not jaundiced or pale.   Neurological:      Mental Status: She is alert and oriented to person, place, and time.   Psychiatric:         Mood and Affect: Mood normal.          Labs:  Lab Results   Component Value Date    WBC 11.96 (H) 04/11/2022    HGB 13.0 04/11/2022    HCT 40.9 04/11/2022    MCV 93.4 04/11/2022    RDW 12.9 04/11/2022     04/11/2022    LYMPH 29.8 04/11/2022    LYMPH 3.56 04/11/2022    MONO 9.2 (H) 04/11/2022    EOS 0.31 04/11/2022    BASO 0.06 04/11/2022     Lab Results   Component Value Date     (L) 04/11/2022    K 4.2 04/11/2022    CL 97 (L) 04/11/2022    CO2 29 04/11/2022    GLU 87 04/11/2022    BUN 20 (H) 04/11/2022    CREATININE 0.71 04/11/2022    CALCIUM 9.6 04/11/2022    PROT 7.6 04/11/2022    ALBUMIN 3.9 04/11/2022    BILITOT 0.4 04/11/2022    ALKPHOS 33 (L) 04/11/2022    AST 30 04/11/2022    ALT 50 04/11/2022         Imaging: US Abdomen Limited    Result Date: 7/18/2022  EXAMINATION: Abdominal ultrasound limited to the right upper quadrant CLINICAL HISTORY: .  Fatty (change of) liver, not elsewhere classified COMPARISON: February 1, 2022 ultrasound TECHNIQUE: Real-time ultrasound images are captured and archived. FINDINGS: Liver is upper normal  in size at 17.6 cm length.  There are some minimally increased echoes throughout the hepatic parenchyma as can be seen with mild hepatic steatosis.  There is no focal hepatic mass. There is no abnormal biliary dilatation for this patient's age.  Common bile duct measures 5.7 mm diameter. There is a small amount of medium echogenic sludge in the dependent portion of the gallbladder lumen.  There is no classic shadowing gallstone.  There is no gallbladder wall thickening. Partially visualized pancreas is unremarkable.  Portions of the body and tail of the pancreas are obscured by bowel gas. Right kidney measures 11.0 cm length. There is no aneurysm of the proximal abdominal aorta.  IVC is patent where seen     Mild gallbladder sludge.  No classic shadowing gallstone Suspect mild hepatic steatosis Electronically signed by: Raf Prince Date:    07/18/2022 Time:    10:00        Assessment:  Kailee Malone is a 77 y.o. female here with:  1. Fatty liver    2. HH (hiatus hernia)    3. Kirkpatrick's esophagus without dysplasia          Recommendations:  1. Request records from [facility]  2. Repeat liver ultrasound in 6 months  3. Continue Prevacid  4. Avoid high fat, greasy and spicy foods  5. Consider HIDA scan pending review of records  6. Will need repeat EGD in 3 years due to Kirkpatrick's esophagus    Follow up in about 6 months (around 1/18/2023).      Order summary:       Thank you for allowing me to participate in the care of Kailee Malone.      ABDIRAHMAN Mercado

## 2022-07-25 ENCOUNTER — OFFICE VISIT (OUTPATIENT)
Dept: NEUROLOGY | Facility: CLINIC | Age: 77
End: 2022-07-25
Payer: MEDICARE

## 2022-07-25 VITALS
OXYGEN SATURATION: 96 % | HEIGHT: 65 IN | DIASTOLIC BLOOD PRESSURE: 66 MMHG | SYSTOLIC BLOOD PRESSURE: 112 MMHG | WEIGHT: 183.81 LBS | BODY MASS INDEX: 30.62 KG/M2 | HEART RATE: 66 BPM

## 2022-07-25 DIAGNOSIS — Z79.899 ON LONG TERM DRUG THERAPY: ICD-10-CM

## 2022-07-25 DIAGNOSIS — G47.30 SLEEP APNEA, UNSPECIFIED TYPE: ICD-10-CM

## 2022-07-25 DIAGNOSIS — R25.1 TREMORS OF NERVOUS SYSTEM: Primary | ICD-10-CM

## 2022-07-25 PROCEDURE — 99213 PR OFFICE/OUTPT VISIT, EST, LEVL III, 20-29 MIN: ICD-10-PCS | Mod: S$PBB,,, | Performed by: NURSE PRACTITIONER

## 2022-07-25 PROCEDURE — 99213 OFFICE O/P EST LOW 20 MIN: CPT | Mod: S$PBB,,, | Performed by: NURSE PRACTITIONER

## 2022-07-25 PROCEDURE — 99214 OFFICE O/P EST MOD 30 MIN: CPT | Mod: PBBFAC | Performed by: NURSE PRACTITIONER

## 2022-07-25 RX ORDER — PROPRANOLOL HYDROCHLORIDE 80 MG/1
CAPSULE, EXTENDED RELEASE ORAL
Qty: 30 CAPSULE | Refills: 3 | Status: SHIPPED | OUTPATIENT
Start: 2022-07-25 | End: 2022-11-22 | Stop reason: DRUGHIGH

## 2022-07-25 NOTE — PATIENT INSTRUCTIONS
Consider sleep study in the future when patient is ready  Regular follow up with PCP for medical management and lab work   Renew and increase propranolol 80 LA mg one tablet daily, monitor heart rate and blood pressure before taking   Consider ROLAND scan in the future if needed  Follow up with Neurology in 3 months or sooner if needed

## 2022-08-15 ENCOUNTER — HOSPITAL ENCOUNTER (OUTPATIENT)
Dept: RADIOLOGY | Facility: HOSPITAL | Age: 77
Discharge: HOME | End: 2022-08-15
Attending: FAMILY MEDICINE
Payer: MEDICARE

## 2022-08-15 DIAGNOSIS — Z12.31 ENCOUNTER FOR SCREENING MAMMOGRAM FOR MALIGNANT NEOPLASM OF BREAST: ICD-10-CM

## 2022-08-15 PROCEDURE — 77067 SCR MAMMO BI INCL CAD: CPT | Mod: TC

## 2022-11-15 ENCOUNTER — OFFICE VISIT (OUTPATIENT)
Dept: OBSTETRICS AND GYNECOLOGY | Facility: CLINIC | Age: 77
End: 2022-11-15
Payer: MEDICARE

## 2022-11-15 ENCOUNTER — TELEPHONE (OUTPATIENT)
Dept: OBSTETRICS AND GYNECOLOGY | Facility: CLINIC | Age: 77
End: 2022-11-15
Payer: MEDICARE

## 2022-11-15 VITALS
OXYGEN SATURATION: 96 % | RESPIRATION RATE: 20 BRPM | DIASTOLIC BLOOD PRESSURE: 75 MMHG | SYSTOLIC BLOOD PRESSURE: 115 MMHG | HEART RATE: 68 BPM

## 2022-11-15 DIAGNOSIS — N89.8 VAGINAL ITCHING: ICD-10-CM

## 2022-11-15 DIAGNOSIS — Z78.0 POSTMENOPAUSAL: ICD-10-CM

## 2022-11-15 DIAGNOSIS — K64.9 HEMORRHOIDS, UNSPECIFIED HEMORRHOID TYPE: ICD-10-CM

## 2022-11-15 DIAGNOSIS — R82.90 ABNORMAL URINE: ICD-10-CM

## 2022-11-15 DIAGNOSIS — R30.0 DYSURIA: Primary | ICD-10-CM

## 2022-11-15 LAB
BILIRUB SERPL-MCNC: NEGATIVE MG/DL
BLOOD URINE, POC: NEGATIVE
CANDIDA SPECIES: NEGATIVE
COLOR, POC UA: YELLOW
GARDNERELLA: NEGATIVE
GLUCOSE UR QL STRIP: NEGATIVE
KETONES UR QL STRIP: NEGATIVE
LEUKOCYTE ESTERASE URINE, POC: ABNORMAL
NITRITE, POC UA: NEGATIVE
PH, POC UA: 7.5
PROTEIN, POC: NEGATIVE
SPECIFIC GRAVITY, POC UA: 1.02
TRICHOMONAS: NEGATIVE
UROBILINOGEN, POC UA: 1

## 2022-11-15 PROCEDURE — 87086 URINE CULTURE/COLONY COUNT: CPT | Mod: ,,, | Performed by: CLINICAL MEDICAL LABORATORY

## 2022-11-15 PROCEDURE — 87086 CULTURE, URINE: ICD-10-PCS | Mod: ,,, | Performed by: CLINICAL MEDICAL LABORATORY

## 2022-11-15 PROCEDURE — 99213 PR OFFICE/OUTPT VISIT, EST, LEVL III, 20-29 MIN: ICD-10-PCS | Mod: ,,, | Performed by: ADVANCED PRACTICE MIDWIFE

## 2022-11-15 PROCEDURE — 87510 BACTERIAL VAGINOSIS: ICD-10-PCS | Mod: ,,, | Performed by: CLINICAL MEDICAL LABORATORY

## 2022-11-15 PROCEDURE — 81003 URINALYSIS AUTO W/O SCOPE: CPT | Mod: RHCUB | Performed by: ADVANCED PRACTICE MIDWIFE

## 2022-11-15 PROCEDURE — 87480 BACTERIAL VAGINOSIS: ICD-10-PCS | Mod: ,,, | Performed by: CLINICAL MEDICAL LABORATORY

## 2022-11-15 PROCEDURE — 87510 GARDNER VAG DNA DIR PROBE: CPT | Mod: ,,, | Performed by: CLINICAL MEDICAL LABORATORY

## 2022-11-15 PROCEDURE — 99213 OFFICE O/P EST LOW 20 MIN: CPT | Mod: ,,, | Performed by: ADVANCED PRACTICE MIDWIFE

## 2022-11-15 PROCEDURE — 87480 CANDIDA DNA DIR PROBE: CPT | Mod: ,,, | Performed by: CLINICAL MEDICAL LABORATORY

## 2022-11-15 PROCEDURE — 87660 TRICHOMONAS VAGIN DIR PROBE: CPT | Mod: ,,, | Performed by: CLINICAL MEDICAL LABORATORY

## 2022-11-15 PROCEDURE — 87660 BACTERIAL VAGINOSIS: ICD-10-PCS | Mod: ,,, | Performed by: CLINICAL MEDICAL LABORATORY

## 2022-11-15 RX ORDER — ESTRADIOL 0.1 MG/G
1 CREAM VAGINAL DAILY
Qty: 42.5 G | Refills: 6 | Status: SHIPPED | OUTPATIENT
Start: 2022-11-15 | End: 2023-11-15

## 2022-11-15 RX ORDER — SULFAMETHOXAZOLE AND TRIMETHOPRIM 800; 160 MG/1; MG/1
1 TABLET ORAL 2 TIMES DAILY
Qty: 10 TABLET | Refills: 0 | Status: SHIPPED | OUTPATIENT
Start: 2022-11-15 | End: 2023-01-18

## 2022-11-15 RX ORDER — HYDROCORTISONE ACETATE 25 MG/1
25 SUPPOSITORY RECTAL 2 TIMES DAILY
Qty: 20 SUPPOSITORY | Refills: 0 | Status: SHIPPED | OUTPATIENT
Start: 2022-11-15 | End: 2022-11-25

## 2022-11-15 RX ORDER — FLUCONAZOLE 100 MG/1
100 TABLET ORAL DAILY
Qty: 2 TABLET | Refills: 0 | Status: SHIPPED | OUTPATIENT
Start: 2022-11-15 | End: 2022-11-17

## 2022-11-15 NOTE — TELEPHONE ENCOUNTER
----- Message from Danni Espinoza CNM sent at 11/15/2022  4:33 PM CST -----  Review with pt.as neg.

## 2022-11-15 NOTE — PROGRESS NOTES
Subjective:       Patient ID: Kailee Malone is a 77 y.o. female.    Chief Complaint: Dysuria (Room 3// Dysuria the last few weeks.)    Ms Malone is c/o burning when she urinate,for over a month,  vaginal itching and odor, hemorrhoid flareup.   She had diarrhea last week.  She stopped using the Estrace vaginal cream when she ran out of refill about 2 months ago.  Review of Systems   Constitutional: Negative.    HENT: Negative.     Respiratory: Negative.     Cardiovascular: Negative.    Gastrointestinal:  Positive for rectal pain.   Genitourinary:  Positive for dysuria and vaginal discharge.   Musculoskeletal:  Positive for arthralgias.   Integumentary:  Negative.   Neurological: Negative.    Hematological: Negative.    Psychiatric/Behavioral: Negative.         Objective:      Physical Exam  Constitutional:       Appearance: She is normal weight.   HENT:      Head: Normocephalic.      Nose: Nose normal.   Eyes:      Extraocular Movements: Extraocular movements intact.   Cardiovascular:      Rate and Rhythm: Normal rate.   Pulmonary:      Effort: Pulmonary effort is normal.   Abdominal:      General: Abdomen is flat.      Palpations: Abdomen is soft.   Genitourinary:     Comments: Affirm self collected.  Skin:     General: Skin is warm and dry.   Neurological:      Mental Status: She is alert and oriented to person, place, and time.   Psychiatric:         Mood and Affect: Mood normal.         Behavior: Behavior normal.       Assessment:       Problem List Items Addressed This Visit    None  Visit Diagnoses       Dysuria    -  Primary    Relevant Medications    sulfamethoxazole-trimethoprim 800-160mg (BACTRIM DS) 800-160 mg Tab    Abnormal urine        Relevant Orders    Urine culture    Postmenopausal        Relevant Medications    estradioL (ESTRACE) 0.01 % (0.1 mg/gram) vaginal cream    Vaginal itching        Relevant Medications    fluconazole (DIFLUCAN) 100 MG tablet    Other Relevant Orders    Bacterial  Vaginosis    Hemorrhoids, unspecified hemorrhoid type        Relevant Medications    hydrocortisone (ANUSOL-HC) 25 mg suppository              Plan:     Urine culture due to WBC's    Refill Estrace vag. Cream    Rx for Bactrim DS, Diflucan    Rx for Anusol HC.

## 2022-11-17 ENCOUNTER — TELEPHONE (OUTPATIENT)
Dept: OBSTETRICS AND GYNECOLOGY | Facility: CLINIC | Age: 77
End: 2022-11-17
Payer: MEDICARE

## 2022-11-17 LAB — UA COMPLETE W REFLEX CULTURE PNL UR: NORMAL

## 2022-11-17 NOTE — PROGRESS NOTES
Subjective:       Patient ID: Kailee Malone is a 77 y.o. female.    Chief Complaint:  No chief complaint on file.      History of Present Illness  This pleasant 77 year old right handed  female presents to the clinic with  for follow up of tremors. Patient states the tremors are a little better today but had gotten worse after a knee injection about one month ago.  Patient states tremors started 20 years ago. She states she can drink from a cup, eat with a fork but sometimes does better with a spoon, she can sometimes use a screw , she can put on her make up and reports decreased hand . She states her hand witting is horrible but was a little better today as she could sign her name. She denies diabetes but has anxiety and depression that is treated with prozac and atarax. She states the anxiety makes her tremors worse. Discussed adding mysoline 25 mg at bedtime and the side effects to include the risk for falls. They desire to try the mysoline and continue the propranolol at 60 mg daily. She states she was tested for ROMINA two years ago and test results were negative. She desires not to have the sleep study at this time but will consider it in the future. She states tremors are worse at rest and better with activity. There was no resting tremor, voice tremor or head tremor noted at today's visit. There was a slight hand tremor with outstretched arms. She denies social embarrassment or functional disability from the tremors. PMH includes tremors, cervix cancer, HTN, RLS, GERDs, and Kirkpatrick's Esophagus. Family history includes father with tremors, DM, CHF and cancer. She denies smoking or drinking alcohol. She states she had a total hysterectomy 25 years ago. She is currently on Propranolol 60 mg daily and is tolerating this medication without side effects. We had increased the propranolol to 80 mg daily but she states it dropped her heart rate into the 50's so she went back to the  propranolol 60 mg daily. She states she monitors her heart rate and blood pressure prior to taking this medication and states her heart rate runs mid 60's to low 70's. She reports today that she has had balance difficulty for over 2 years. She reports 1 or 2 falls per year that occurs when she is going up stairs. She states her last fall was in July 2022. Discussed fall precautions in detail and physical therapy. She desires to wait on the physical therapy until after the first of the year. Discussed the plan in detail with Neurologist Dr. MOY Domínguez, the patient and  and they are in agreement with the plan. All their questions were answered at today's visit.       MRI of the brain with and without contrast done on April 12, 2022 showed no acute abnormality.       Review of Systems  Review of Systems   Constitutional:  Negative for activity change, diaphoresis, fever and unexpected weight change.   HENT:  Negative for congestion, ear pain, facial swelling, hearing loss, tinnitus, trouble swallowing and voice change.    Eyes:  Negative for photophobia, pain and visual disturbance.   Respiratory:  Negative for chest tightness, shortness of breath and wheezing.    Cardiovascular:  Negative for chest pain, palpitations and leg swelling.   Gastrointestinal:  Negative for constipation, diarrhea, nausea and vomiting.   Genitourinary:  Negative for difficulty urinating.   Musculoskeletal:  Positive for gait problem. Negative for back pain, neck pain and neck stiffness.   Skin:  Negative for color change, pallor, rash and wound.   Neurological:  Positive for tremors. Negative for dizziness, seizures, syncope, facial asymmetry, speech difficulty, weakness, light-headedness, numbness and headaches.   Psychiatric/Behavioral:  Negative for agitation, behavioral problems, confusion, decreased concentration and hallucinations. The patient is not nervous/anxious and is not hyperactive.      Objective:      Neurologic Exam      Mental Status   Oriented to person, place, and time.   Oriented to person.   Oriented to place.   Oriented to time.   Attention: normal. Concentration: normal.   Speech: speech is normal   Level of consciousness: alert  Knowledge: good.     Cranial Nerves     CN II   Visual fields full to confrontation.     CN III, IV, VI   Pupils are equal, round, and reactive to light.  Extraocular motions are normal.   Right pupil: Size: 3 mm. Shape: regular. Reactivity: brisk.   Left pupil: Size: 3 mm. Shape: regular. Reactivity: brisk.   CN III: no CN III palsy  CN VI: no CN VI palsy    CN V   Facial sensation intact.     CN VII   Facial expression full, symmetric.     CN VIII   CN VIII normal.   Hearing: intact    CN IX, X   CN IX normal.   CN X normal.     CN XI   CN XI normal.     CN XII   CN XII normal.     Motor Exam   Muscle bulk: normal  Overall muscle tone: normal  Right arm pronator drift: absent  Left arm pronator drift: absent    Strength   Right deltoid: 5/5  Left deltoid: 5/5  Right biceps: 5/5  Left biceps: 5/5  Right triceps: 5/5  Left triceps: 5/5  Right quadriceps: 5/5  Left quadriceps: 5/5  Right hamstrin/5  Left hamstrin/5    Sensory Exam   Light touch normal.     Gait, Coordination, and Reflexes     Gait  Gait: normal    Coordination   Romberg: positive  Finger to nose coordination: normal    Tremor   Resting tremor: absent  Intention tremor: absent  Action tremor: left arm and right arm    Reflexes   Right brachioradialis: 2+  Left brachioradialis: 2+  Right biceps: 2+  Left biceps: 2+  Right triceps: 2+  Left triceps: 2+  Right patellar: 2+  Left patellar: 2+  Right achilles: 2+  Left achilles: 2+  Right : 4+  Left : 4+    Physical Exam  Constitutional:       General: She is not in acute distress.  HENT:      Head: Normocephalic.      Nose: Nose normal.      Mouth/Throat:      Mouth: Mucous membranes are moist.   Eyes:      Extraocular Movements: Extraocular movements intact and EOM  normal.      Pupils: Pupils are equal, round, and reactive to light.   Cardiovascular:      Rate and Rhythm: Normal rate and regular rhythm.      Heart sounds: Normal heart sounds. No murmur heard.  Pulmonary:      Effort: Pulmonary effort is normal. No respiratory distress.      Breath sounds: Normal breath sounds. No wheezing, rhonchi or rales.   Musculoskeletal:         General: No swelling, tenderness, deformity or signs of injury. Normal range of motion.      Cervical back: Normal range of motion. No rigidity or tenderness.      Right lower leg: No edema.      Left lower leg: No edema.   Skin:     General: Skin is warm and dry.      Capillary Refill: Capillary refill takes less than 2 seconds.      Coloration: Skin is not jaundiced or pale.      Findings: No bruising, erythema, lesion or rash.   Neurological:      Mental Status: She is alert and oriented to person, place, and time.      Coordination: Romberg Test abnormal. Finger-Nose-Finger Test normal.      Gait: Gait is intact.      Deep Tendon Reflexes:      Reflex Scores:       Tricep reflexes are 2+ on the right side and 2+ on the left side.       Bicep reflexes are 2+ on the right side and 2+ on the left side.       Brachioradialis reflexes are 2+ on the right side and 2+ on the left side.       Patellar reflexes are 2+ on the right side and 2+ on the left side.       Achilles reflexes are 2+ on the right side and 2+ on the left side.  Psychiatric:         Mood and Affect: Mood normal.         Speech: Speech normal.         Behavior: Behavior normal.         Assessment:     Problem List Items Addressed This Visit          Neuro    Tremors of nervous system - Primary    Relevant Medications    propranoloL (INDERAL) 60 MG tablet    primidone (MYSOLINE) 50 MG Tab       Palliative Care    On long term drug therapy       Other    Sleep apnea    Balance problems         Plan:     Consider sleep study in the future when patient is ready  Regular follow up with  PCP for medical management and lab work   Renew and continue propranolol 60 mg one tablet daily, monitor heart rate and blood pressure before taking   Rx mysoline 50 mg take half a tablet at bedtime, discussed side effects  Fall precautions   Consider physical therapy in the future when patient is ready for balance training  Consider ROLAND scan in the future if needed  Follow up with Neurology in 3 months or sooner if needed

## 2022-11-17 NOTE — TELEPHONE ENCOUNTER
----- Message from Danni Espinoza CNM sent at 11/17/2022  2:31 PM CST -----  Review with pt.as infection found in the urine culture.   I think the estrogen cream and drinking more water will help.

## 2022-11-22 ENCOUNTER — OFFICE VISIT (OUTPATIENT)
Dept: NEUROLOGY | Facility: CLINIC | Age: 77
End: 2022-11-22
Payer: MEDICARE

## 2022-11-22 VITALS
SYSTOLIC BLOOD PRESSURE: 116 MMHG | HEIGHT: 65 IN | HEART RATE: 67 BPM | OXYGEN SATURATION: 96 % | WEIGHT: 181 LBS | DIASTOLIC BLOOD PRESSURE: 74 MMHG | BODY MASS INDEX: 30.16 KG/M2

## 2022-11-22 DIAGNOSIS — Z79.899 ON LONG TERM DRUG THERAPY: ICD-10-CM

## 2022-11-22 DIAGNOSIS — G47.30 SLEEP APNEA, UNSPECIFIED TYPE: ICD-10-CM

## 2022-11-22 DIAGNOSIS — R25.1 TREMORS OF NERVOUS SYSTEM: Primary | ICD-10-CM

## 2022-11-22 DIAGNOSIS — R26.89 BALANCE PROBLEMS: ICD-10-CM

## 2022-11-22 PROBLEM — K76.0 NONALCOHOLIC FATTY LIVER DISEASE: Status: ACTIVE | Noted: 2022-11-22

## 2022-11-22 PROBLEM — G47.00 INSOMNIA: Status: ACTIVE | Noted: 2022-11-22

## 2022-11-22 PROBLEM — M19.90 OSTEOARTHRITIS: Status: ACTIVE | Noted: 2022-11-22

## 2022-11-22 PROCEDURE — 99213 PR OFFICE/OUTPT VISIT, EST, LEVL III, 20-29 MIN: ICD-10-PCS | Mod: S$PBB,,, | Performed by: NURSE PRACTITIONER

## 2022-11-22 PROCEDURE — 99213 OFFICE O/P EST LOW 20 MIN: CPT | Mod: S$PBB,,, | Performed by: NURSE PRACTITIONER

## 2022-11-22 PROCEDURE — 99215 OFFICE O/P EST HI 40 MIN: CPT | Mod: PBBFAC | Performed by: NURSE PRACTITIONER

## 2022-11-22 RX ORDER — HYDROXYZINE HYDROCHLORIDE 25 MG/1
TABLET, FILM COATED ORAL
COMMUNITY
Start: 2022-11-21 | End: 2023-01-18

## 2022-11-22 RX ORDER — PROPRANOLOL HYDROCHLORIDE 60 MG/1
TABLET ORAL
Qty: 90 TABLET | Refills: 3 | Status: SHIPPED | OUTPATIENT
Start: 2022-11-22 | End: 2023-02-23 | Stop reason: SDUPTHER

## 2022-11-22 RX ORDER — PRIMIDONE 50 MG/1
TABLET ORAL
Qty: 15 TABLET | Refills: 3 | Status: SHIPPED | OUTPATIENT
Start: 2022-11-22 | End: 2023-02-23 | Stop reason: SDUPTHER

## 2022-11-22 NOTE — PATIENT INSTRUCTIONS
Consider sleep study in the future when patient is ready  Regular follow up with PCP for medical management and lab work   Renew and continue propranolol 60 mg one tablet daily, monitor heart rate and blood pressure before taking   Rx mysoline 50 mg take half a tablet at bedtime, discussed side effects  Fall precautions   Consider physical therapy in the future when patient is ready for balance training  Consider ROLAND scan in the future if needed  Follow up with Neurology in 3 months or sooner if needed

## 2023-01-18 ENCOUNTER — OFFICE VISIT (OUTPATIENT)
Dept: GASTROENTEROLOGY | Facility: CLINIC | Age: 78
End: 2023-01-18
Payer: MEDICARE

## 2023-01-18 VITALS
HEART RATE: 70 BPM | WEIGHT: 185.81 LBS | DIASTOLIC BLOOD PRESSURE: 76 MMHG | BODY MASS INDEX: 30.96 KG/M2 | OXYGEN SATURATION: 97 % | SYSTOLIC BLOOD PRESSURE: 126 MMHG | HEIGHT: 65 IN

## 2023-01-18 DIAGNOSIS — R10.13 EPIGASTRIC PAIN: ICD-10-CM

## 2023-01-18 DIAGNOSIS — K76.0 FATTY LIVER: Primary | ICD-10-CM

## 2023-01-18 DIAGNOSIS — K22.70 BARRETT'S ESOPHAGUS WITHOUT DYSPLASIA: ICD-10-CM

## 2023-01-18 PROCEDURE — 99214 PR OFFICE/OUTPT VISIT, EST, LEVL IV, 30-39 MIN: ICD-10-PCS | Mod: S$PBB,,, | Performed by: NURSE PRACTITIONER

## 2023-01-18 PROCEDURE — 99214 OFFICE O/P EST MOD 30 MIN: CPT | Mod: S$PBB,,, | Performed by: NURSE PRACTITIONER

## 2023-01-18 PROCEDURE — 99214 OFFICE O/P EST MOD 30 MIN: CPT | Mod: PBBFAC | Performed by: NURSE PRACTITIONER

## 2023-01-18 RX ORDER — LANSOPRAZOLE 30 MG/1
30 CAPSULE, DELAYED RELEASE ORAL DAILY
Qty: 90 CAPSULE | Refills: 3 | Status: SHIPPED | OUTPATIENT
Start: 2023-01-18 | End: 2023-04-12 | Stop reason: SDUPTHER

## 2023-01-18 RX ORDER — KETOCONAZOLE 20 MG/ML
SHAMPOO, SUSPENSION TOPICAL
COMMUNITY
Start: 2022-10-31

## 2023-01-18 NOTE — PROGRESS NOTES
Kailee Malone is a 77 y.o. female here for Follow-up (6 month)        PCP: Caden Bradford  Referring Provider: No referring provider defined for this encounter.     HPI:  Presents for 6 month follow up fatty liver. States that had a recent abdominal ultrasound at [facility]. Report is not available for review. Last EGD 2/3/22, non-bleeding prepyloric ulcer. No abdominal pain. Reports diarrhea for several weeks that ended on Sunday. No hematochezia or melena. No nocturnal diarrhea. Last colonoscopy 2021, recommendation to repeat in 5 years. Does have hemorrhoid cream. Was prescribed Anusol suppositories and is now using OTC medication. She did have COVID last week. States that symptoms have improved.         ROS:  Review of Systems   Constitutional:  Negative for appetite change and unexpected weight change.   HENT:  Negative for trouble swallowing.    Respiratory:  Negative for shortness of breath.    Gastrointestinal:  Positive for diarrhea, rectal pain (itching) and reflux. Negative for blood in stool and constipation.   Musculoskeletal:  Negative for gait problem.   Integumentary:  Negative for pallor.   Neurological:  Positive for tremors. Negative for dizziness.   Psychiatric/Behavioral:  The patient is not nervous/anxious.         PMHX:  has a past medical history of Acute gastric ulcer without hemorrhage or perforation (02/03/2022), Acute superficial gastritis without hemorrhage (02/03/2022), Kirkpatrick's esophagus without dysplasia (02/04/2022), Diverticula, colon (10/04/2021), HH (hiatus hernia) (02/03/2022), History of colon polyps (10/04/2021), Hypertension, and Screening for colon cancer (10/04/2021).    PSHX:  has a past surgical history that includes RT WRIST; Hysterectomy; Breast surgery; Arthroscopy of knee (Right, 05/24/2021); Knee arthroscopy w/ meniscectomy (Right, 05/24/2021); Breast biopsy; and Oophorectomy.    PFHX: family history includes Breast cancer in her maternal aunt and  mother.    PSlHX:  reports that she has never smoked. She has never used smokeless tobacco. She reports that she does not drink alcohol and does not use drugs.        Review of patient's allergies indicates:   Allergen Reactions    Betadine prepstick [povidone-iodine-ethyl alcohol]     Penicillins        Medication List with Changes/Refills   Current Medications    ALENDRONATE (FOSAMAX) 70 MG TABLET    Take 70 mg by mouth every 7 days.    ERGOCALCIFEROL (ERGOCALCIFEROL) 50,000 UNIT CAP    Take one capsule weekly for 12 weeks then reduce to one capsule monthly    ESTRADIOL (ESTRACE) 0.01 % (0.1 MG/GRAM) VAGINAL CREAM    Place 1 g vaginally once daily.    FLUOXETINE 20 MG CAPSULE    Take 20 mg by mouth once daily.    KETOCONAZOLE (NIZORAL) 2 % CREAM    APPLY TO AFFECTED AREAS TWICE DAILY UNTIL CLEAR    KETOCONAZOLE (NIZORAL) 2 % SHAMPOO    APPLY ON BACK LEAVE ON FOR 5 MINUTES THEN RINSE, 2 TO 3 TIMES WEEKLY    PRIMIDONE (MYSOLINE) 50 MG TAB    Take half a tablet at bedtime    PROPRANOLOL (INDERAL) 60 MG TABLET    Take one tablet daily, monitor heart rate and blood pressure before taking    TRIAMTERENE-HYDROCHLOROTHIAZIDE 37.5-25 MG (MAXZIDE-25) 37.5-25 MG PER TABLET    Take 1 tablet by mouth once daily.   Changed and/or Refilled Medications    Modified Medication Previous Medication    LANSOPRAZOLE (PREVACID) 30 MG CAPSULE lansoprazole (PREVACID) 30 MG capsule       Take 1 capsule (30 mg total) by mouth once daily.    Take 1 capsule (30 mg total) by mouth once daily.   Discontinued Medications    CYCLOBENZAPRINE (FLEXERIL) 5 MG TABLET    Take 5 mg by mouth 3 (three) times daily as needed.    DOXYCYCLINE (VIBRAMYCIN) 100 MG CAP    Take 100 mg by mouth 2 (two) times daily.    HYDROXYZINE HCL (ATARAX) 25 MG TABLET        SUDOGEST 60 MG TABLET    Take 60 mg by mouth 3 (three) times daily as needed.    SULFAMETHOXAZOLE-TRIMETHOPRIM 800-160MG (BACTRIM DS) 800-160 MG TAB    Take 1 tablet by mouth 2 (two) times daily.     "    Objective Findings:  Vital Signs:  /76   Pulse 70   Ht 5' 5" (1.651 m)   Wt 84.3 kg (185 lb 12.8 oz)   SpO2 97%   BMI 30.92 kg/m²  Body mass index is 30.92 kg/m².    Physical Exam:  Physical Exam  Vitals and nursing note reviewed.   Constitutional:       General: She is not in acute distress.     Appearance: Normal appearance.   HENT:      Mouth/Throat:      Mouth: Mucous membranes are moist.   Cardiovascular:      Rate and Rhythm: Normal rate.   Pulmonary:      Effort: Pulmonary effort is normal.      Breath sounds: No wheezing, rhonchi or rales.   Abdominal:      General: Bowel sounds are normal. There is no distension.      Palpations: Abdomen is soft. There is no mass.      Tenderness: There is no abdominal tenderness.   Skin:     General: Skin is warm and dry.      Coloration: Skin is not jaundiced or pale.      Findings: No bruising.   Neurological:      Mental Status: She is alert and oriented to person, place, and time.   Psychiatric:         Mood and Affect: Mood normal.        Labs:  Lab Results   Component Value Date    WBC 11.96 (H) 04/11/2022    HGB 13.0 04/11/2022    HCT 40.9 04/11/2022    MCV 93.4 04/11/2022    RDW 12.9 04/11/2022     04/11/2022    LYMPH 29.8 04/11/2022    LYMPH 3.56 04/11/2022    MONO 9.2 (H) 04/11/2022    EOS 0.31 04/11/2022    BASO 0.06 04/11/2022     Lab Results   Component Value Date     (L) 04/11/2022    K 4.2 04/11/2022    CL 97 (L) 04/11/2022    CO2 29 04/11/2022    GLU 87 04/11/2022    BUN 20 (H) 04/11/2022    CREATININE 0.71 04/11/2022    CALCIUM 9.6 04/11/2022    PROT 7.6 04/11/2022    ALBUMIN 3.9 04/11/2022    BILITOT 0.4 04/11/2022    ALKPHOS 33 (L) 04/11/2022    AST 30 04/11/2022    ALT 50 04/11/2022         Imaging: No results found.      Assessment:  Kailee Malone is a 77 y.o. female here with:  1. Fatty liver    2. Kirkpatrick's esophagus without dysplasia    3. Epigastric pain          Recommendations:  1. Request ultrasound from " [facility]  CBC, CMP today  Exercise 150 minutes per week  Weight loss of 7-10%. Weight loss should be gradual  Diet low in saturated fats and carbohydrates  Good glucose and cholesterol control  2. Continue Prevacid  3. Call back if diarrhea returns, will get stool specimen  4. Continue OTC hemorrhoid cream  Follow up in about 6 months (around 7/18/2023).      Order summary:  Orders Placed This Encounter    CBC Auto Differential    Comprehensive Metabolic Panel    lansoprazole (PREVACID) 30 MG capsule       Thank you for allowing me to participate in the care of Kailee Malone.      Geri Sevilla, ZANA-C

## 2023-01-18 NOTE — PATIENT INSTRUCTIONS
Avoid spicy, greasy foods  Avoid caffeine, citric acid, chocolate, peppermint, and carbonated drinks  Do not lay down within 3 hours of eating  Increase fluid to 64 ounces daily  Avoid antiinflammatory medications such as motrin, advil, aleve, ibuprofen, and BC powder    Exercise 150 minutes per week  Weight loss of 7-10%. Weight loss should be gradual  Diet low in saturated fats and carbohydrates  Good glucose and cholesterol control

## 2023-01-24 ENCOUNTER — TELEPHONE (OUTPATIENT)
Dept: GASTROENTEROLOGY | Facility: CLINIC | Age: 78
End: 2023-01-24
Payer: MEDICARE

## 2023-01-24 DIAGNOSIS — R19.7 DIARRHEA, UNSPECIFIED TYPE: Primary | ICD-10-CM

## 2023-01-24 DIAGNOSIS — R19.7 DIARRHEA, UNSPECIFIED TYPE: ICD-10-CM

## 2023-01-24 NOTE — TELEPHONE ENCOUNTER
Resutls called to patient. Verbalized understanding. States she is having diarrhea again and was told to let Geri Sevilla NP know. Informed that I would notify Geri Sevilla NP.

## 2023-01-24 NOTE — TELEPHONE ENCOUNTER
Returned call to patient to notify that after speaking with Geri Sevilla NP that she said she would order some stool tests, and for her to drop off a specimen to the lab. Verbalized understanding.

## 2023-01-25 LAB
C COLI+JEJ+UPSA DNA STL QL NAA+NON-PROBE: NEGATIVE
E COLI SXT1 STL QL IA: NEGATIVE
E COLI SXT2 STL QL IA: NEGATIVE
FATTY ACIDS: NORMAL /HPF
FECAL LEUKOCYTES: NORMAL /HPF
GIARDIA ANTIGEN: NEGATIVE
NEUTRAL FATS: NORMAL /HPF
NOROVIRUS GI+II RNA STL QL NAA+NON-PROBE: NEGATIVE
OCCULT BLOOD: NEGATIVE
RVA RNA STL QL NAA+NON-PROBE: NEGATIVE
S ENT+BONG DNA STL QL NAA+NON-PROBE: NEGATIVE
SHIGELLA SPECIES NAT: NEGATIVE
V CHOL+PARA+VUL DNA STL QL NAA+NON-PROBE: NEGATIVE
Y ENTEROCOL DNA STL QL NAA+NON-PROBE: NEGATIVE

## 2023-01-25 PROCEDURE — 83993 CALPROTECTIN, STOOL: ICD-10-PCS | Mod: 90,,, | Performed by: CLINICAL MEDICAL LABORATORY

## 2023-01-25 PROCEDURE — 87506 ENTERIC PATHOGEN PANEL: ICD-10-PCS | Mod: ,,, | Performed by: CLINICAL MEDICAL LABORATORY

## 2023-01-25 PROCEDURE — 36415 PR COLLECTION VENOUS BLOOD,VENIPUNCTURE: ICD-10-PCS | Mod: ,,, | Performed by: CLINICAL MEDICAL LABORATORY

## 2023-01-25 PROCEDURE — 89055 FECAL LEUKOCYTES: ICD-10-PCS | Mod: ,,, | Performed by: CLINICAL MEDICAL LABORATORY

## 2023-01-25 PROCEDURE — 83993 ASSAY FOR CALPROTECTIN FECAL: CPT | Mod: 90,,, | Performed by: CLINICAL MEDICAL LABORATORY

## 2023-01-25 PROCEDURE — 87329 GIARDIA ANTIGEN: ICD-10-PCS | Mod: XU,,, | Performed by: CLINICAL MEDICAL LABORATORY

## 2023-01-25 PROCEDURE — 87506 IADNA-DNA/RNA PROBE TQ 6-11: CPT | Mod: ,,, | Performed by: CLINICAL MEDICAL LABORATORY

## 2023-01-25 PROCEDURE — 87493 C DIFF AMPLIFIED PROBE: CPT | Mod: XU,,, | Performed by: CLINICAL MEDICAL LABORATORY

## 2023-01-25 PROCEDURE — 87329 GIARDIA AG IA: CPT | Mod: XU,,, | Performed by: CLINICAL MEDICAL LABORATORY

## 2023-01-25 PROCEDURE — 87177 OVA AND PARASITE EXAM: ICD-10-PCS | Mod: ,,, | Performed by: CLINICAL MEDICAL LABORATORY

## 2023-01-25 PROCEDURE — 89055 LEUKOCYTE ASSESSMENT FECAL: CPT | Mod: ,,, | Performed by: CLINICAL MEDICAL LABORATORY

## 2023-01-25 PROCEDURE — 87493 C DIFF TOXIN BY PCR: ICD-10-PCS | Mod: XU,,, | Performed by: CLINICAL MEDICAL LABORATORY

## 2023-01-25 PROCEDURE — 82270 OCCULT BLOOD FECES: CPT | Mod: ,,, | Performed by: CLINICAL MEDICAL LABORATORY

## 2023-01-25 PROCEDURE — 82270 OCCULT BLOOD X 3, STOOL: ICD-10-PCS | Mod: ,,, | Performed by: CLINICAL MEDICAL LABORATORY

## 2023-01-25 PROCEDURE — 82653 EL-1 FECAL QUANTITATIVE: CPT | Mod: 90,,, | Performed by: CLINICAL MEDICAL LABORATORY

## 2023-01-25 PROCEDURE — 36415 COLL VENOUS BLD VENIPUNCTURE: CPT | Mod: ,,, | Performed by: CLINICAL MEDICAL LABORATORY

## 2023-01-25 PROCEDURE — 82705 FECAL FAT, QUALITATIVE: ICD-10-PCS | Mod: ,,, | Performed by: CLINICAL MEDICAL LABORATORY

## 2023-01-25 PROCEDURE — 82653 MAYO GENERIC ORDERABLE: ICD-10-PCS | Mod: 90,,, | Performed by: CLINICAL MEDICAL LABORATORY

## 2023-01-25 PROCEDURE — 87177 OVA AND PARASITES SMEARS: CPT | Mod: ,,, | Performed by: CLINICAL MEDICAL LABORATORY

## 2023-01-25 PROCEDURE — 87209 SMEAR COMPLEX STAIN: CPT | Mod: ,,, | Performed by: CLINICAL MEDICAL LABORATORY

## 2023-01-25 PROCEDURE — 87209 OVA AND PARASITE EXAM: ICD-10-PCS | Mod: ,,, | Performed by: CLINICAL MEDICAL LABORATORY

## 2023-01-25 PROCEDURE — 82705 FATS/LIPIDS FECES QUAL: CPT | Mod: ,,, | Performed by: CLINICAL MEDICAL LABORATORY

## 2023-01-26 ENCOUNTER — TELEPHONE (OUTPATIENT)
Dept: GASTROENTEROLOGY | Facility: CLINIC | Age: 78
End: 2023-01-26
Payer: MEDICARE

## 2023-01-26 LAB — C DIFF TOX A+B STL IA-ACNC: NEGATIVE

## 2023-01-26 NOTE — TELEPHONE ENCOUNTER
Results called to patient. Verbalized understanding.          ----- Message from ZANA Redmond sent at 1/26/2023  7:49 AM CST -----  Occult blood is negative. Pathogen is negative, leukocytes negative

## 2023-01-26 NOTE — TELEPHONE ENCOUNTER
Results called to patient. Verbalized understanding.            ----- Message from ZANA Redmond sent at 1/26/2023  3:27 PM CST -----  C diff is negative

## 2023-01-28 LAB — MAYO GENERIC ORDERABLE RESULT: ABNORMAL

## 2023-01-29 LAB
O+P STL CONC: NORMAL
TRICHROME SMEAR: NORMAL

## 2023-01-30 ENCOUNTER — TELEPHONE (OUTPATIENT)
Dept: GASTROENTEROLOGY | Facility: CLINIC | Age: 78
End: 2023-01-30
Payer: MEDICARE

## 2023-01-30 DIAGNOSIS — R19.7 DIARRHEA, UNSPECIFIED TYPE: ICD-10-CM

## 2023-01-30 DIAGNOSIS — K86.89 PANCREATIC INSUFFICIENCY: Primary | ICD-10-CM

## 2023-01-30 LAB — CALPROTECTIN STL-MCNT: 67.8 MCG/G

## 2023-01-30 RX ORDER — PANCRELIPASE 36000; 180000; 114000 [USP'U]/1; [USP'U]/1; [USP'U]/1
CAPSULE, DELAYED RELEASE PELLETS ORAL
Qty: 240 CAPSULE | Refills: 0 | Status: SHIPPED | OUTPATIENT
Start: 2023-01-30 | End: 2023-03-08

## 2023-01-30 NOTE — TELEPHONE ENCOUNTER
Results and recommendation called to patient. Verbalized understanding.          ----- Message from ZANA Redmond sent at 1/30/2023 11:28 AM CST -----  Pancreatic elastase is low. This can cause diarrhea. I prescribed Creon as a trial. She needs to follow up in clinic.

## 2023-02-02 ENCOUNTER — TELEPHONE (OUTPATIENT)
Dept: GASTROENTEROLOGY | Facility: CLINIC | Age: 78
End: 2023-02-02
Payer: MEDICARE

## 2023-02-02 NOTE — TELEPHONE ENCOUNTER
Results called to patient. Verbalized understanding. States she is picking up Creon today due to her pharmacy being out of the medication last week.            ----- Message from ZANA Redmond sent at 2/1/2023  1:01 PM CST -----  Calprotectin is minimally elevated. We will see how she does on Creon first before any further tests.

## 2023-02-02 NOTE — TELEPHONE ENCOUNTER
Attempted to call results. No answer or voicemail noted.            ----- Message from ZANA Redmond sent at 2/1/2023  1:01 PM CST -----  Calprotectin is minimally elevated. We will see how she does on Creon first before any further tests.

## 2023-02-15 ENCOUNTER — TELEPHONE (OUTPATIENT)
Dept: GASTROENTEROLOGY | Facility: CLINIC | Age: 78
End: 2023-02-15
Payer: MEDICARE

## 2023-02-15 NOTE — TELEPHONE ENCOUNTER
Returned call to patient. States she is still having diarrhea after taking the creon. Spoke with Geri Sevilla NP recommends office visit. Scheduled for tues 2/21/23 at 10:45am. Patient verbalized understanding.          ----- Message from Cordelia Torres sent at 2/15/2023 10:17 AM CST -----  Will be finishing Creon this Friday   she needed to talk to you about when to come back for recheck?

## 2023-02-19 NOTE — PROGRESS NOTES
Subjective:       Patient ID: Kailee Malone is a 77 y.o. female.    Chief Complaint:  No chief complaint on file.      History of Present Illness  This pleasant 77 year old right handed  female presents to the clinic with  for follow up of tremors. Patient states the tremors are getting better. Patient states tremors started 20 years ago. She states she can drink from a cup, eat with a fork but sometimes does better with a spoon, she can sometimes use a screw , she can put on her make up and reports decreased hand . She states her hand witting is better some days than others. She denies diabetes but has anxiety and depression that is treated with prozac and buspar. She states the anxiety makes her tremors worse. She is currently on mysoline 25 mg at bedtime and Propranolol 60 mg daily for the tremors. She is tolerating these medications without side effects. Discussed increasing the Mysoline to 50 mg at bedtime and discussed the increased risk for falls. They desire to try the increase in mysoline and continue the propranolol at 60 mg daily. She states she was tested for ROMINA two years ago and test results were negative. She desires not to have the sleep study at this time but will consider it in the future. She states tremors are worse when using her arms below or above her waist. There was no resting tremor, voice tremor or head tremor noted at today's visit. There was a slight hand tremor with outstretched arms. She denies social embarrassment or functional disability from the tremors. PMH includes tremors, cervix cancer, HTN, RLS, GERDs, and Kirkpatrick's Esophagus. Family history includes father with tremors, DM, CHF and cancer. She denies smoking or drinking alcohol. She states she had a total hysterectomy 25 years ago.  We had increased the propranolol to 80 mg daily in the past but she states it dropped her heart rate into the 50's so she went back to the propranolol 60 mg daily.  She states she monitors her heart rate and blood pressure prior to taking this medication and states her heart rate runs mid 60's to low 70's. She reported in the past that she has had balance difficulty for over 2 years. She reports 1 or 2 falls per year that occurs when she is going up stairs. She states her last fall was in July 2022. Discussed fall precautions in detail and physical therapy. She desires to wait on the physical therapy. Discussed the plan in detail with Neurologist Dr. MOY Domínguez, the patient and  and they are in agreement with the plan. All their questions were answered at today's visit.       MRI of the brain with and without contrast done on April 12, 2022 showed no acute abnormality.          Review of Systems  Review of Systems   Constitutional:  Negative for activity change, diaphoresis, fever and unexpected weight change.   HENT:  Negative for congestion, ear pain, facial swelling, hearing loss, tinnitus, trouble swallowing and voice change.    Eyes:  Negative for photophobia, pain and visual disturbance.   Respiratory:  Negative for chest tightness, shortness of breath and wheezing.    Cardiovascular:  Negative for chest pain, palpitations and leg swelling.   Gastrointestinal:  Negative for constipation, diarrhea, nausea and vomiting.   Genitourinary:  Negative for difficulty urinating.   Musculoskeletal:  Positive for gait problem. Negative for back pain, neck pain and neck stiffness.   Skin:  Negative for color change, pallor, rash and wound.   Neurological:  Positive for tremors. Negative for dizziness, seizures, syncope, facial asymmetry, speech difficulty, weakness, light-headedness, numbness and headaches.   Psychiatric/Behavioral:  Negative for agitation, behavioral problems, confusion, decreased concentration and hallucinations. The patient is not nervous/anxious and is not hyperactive.      Objective:      Neurologic Exam     Mental Status   Oriented to person, place, and  time.   Oriented to person.   Oriented to place.   Oriented to time.   Attention: normal. Concentration: normal.   Speech: speech is normal   Level of consciousness: alert  Knowledge: good.     Cranial Nerves     CN II   Visual fields full to confrontation.     CN III, IV, VI   Pupils are equal, round, and reactive to light.  Extraocular motions are normal.   Right pupil: Size: 3 mm. Shape: regular. Reactivity: brisk.   Left pupil: Size: 3 mm. Shape: regular. Reactivity: brisk.   CN III: no CN III palsy  CN VI: no CN VI palsy    CN V   Facial sensation intact.     CN VII   Facial expression full, symmetric.     CN VIII   CN VIII normal.   Hearing: intact    CN IX, X   CN IX normal.   CN X normal.     CN XI   CN XI normal.     CN XII   CN XII normal.     Motor Exam   Muscle bulk: normal  Overall muscle tone: normal  Right arm pronator drift: absent  Left arm pronator drift: absent    Strength   Right deltoid: 5/5  Left deltoid: 5/5  Right biceps: 5/5  Left biceps: 5/5  Right triceps: 5/5  Left triceps: 5/5  Right quadriceps: 5/5  Left quadriceps: 5/5  Right hamstrin/5  Left hamstrin/5    Sensory Exam   Light touch normal.     Gait, Coordination, and Reflexes     Gait  Gait: normal    Coordination   Romberg: positive  Finger to nose coordination: normal    Tremor   Resting tremor: absent  Intention tremor: absent  Action tremor: left arm and right arm    Reflexes   Right brachioradialis: 2+  Left brachioradialis: 2+  Right biceps: 2+  Left biceps: 2+  Right triceps: 2+  Left triceps: 2+  Right patellar: 2+  Left patellar: 2+  Right achilles: 2+  Left achilles: 2+  Right : 4+  Left : 4+    Physical Exam  Constitutional:       General: She is not in acute distress.  HENT:      Head: Normocephalic.      Nose: Nose normal.      Mouth/Throat:      Mouth: Mucous membranes are moist.   Eyes:      Extraocular Movements: Extraocular movements intact and EOM normal.      Pupils: Pupils are equal, round, and  reactive to light.   Cardiovascular:      Rate and Rhythm: Normal rate and regular rhythm.      Heart sounds: Normal heart sounds. No murmur heard.  Pulmonary:      Effort: Pulmonary effort is normal. No respiratory distress.      Breath sounds: Normal breath sounds. No wheezing, rhonchi or rales.   Musculoskeletal:         General: No swelling, tenderness, deformity or signs of injury. Normal range of motion.      Cervical back: Normal range of motion. No rigidity or tenderness.      Right lower leg: No edema.      Left lower leg: No edema.   Skin:     General: Skin is warm and dry.      Capillary Refill: Capillary refill takes less than 2 seconds.      Coloration: Skin is not jaundiced or pale.      Findings: No bruising, erythema, lesion or rash.   Neurological:      Mental Status: She is alert and oriented to person, place, and time.      Coordination: Romberg Test abnormal. Finger-Nose-Finger Test normal.      Gait: Gait is intact.      Deep Tendon Reflexes:      Reflex Scores:       Tricep reflexes are 2+ on the right side and 2+ on the left side.       Bicep reflexes are 2+ on the right side and 2+ on the left side.       Brachioradialis reflexes are 2+ on the right side and 2+ on the left side.       Patellar reflexes are 2+ on the right side and 2+ on the left side.       Achilles reflexes are 2+ on the right side and 2+ on the left side.  Psychiatric:         Mood and Affect: Mood normal.         Speech: Speech normal.         Behavior: Behavior normal.         Assessment:     Problem List Items Addressed This Visit          Neuro    Tremors of nervous system - Primary    Relevant Medications    propranoloL (INDERAL) 60 MG tablet    primidone (MYSOLINE) 50 MG Tab       Endocrine    Vitamin D deficiency    Relevant Medications    ergocalciferol (ERGOCALCIFEROL) 50,000 unit Cap       Palliative Care    On long term drug therapy       Other    Sleep apnea    Balance problems         Plan:       Consider sleep  study in the future when patient is ready  Regular follow up with PCP for medical management and lab work   Renew and continue propranolol 60 mg one tablet daily, monitor heart rate and blood pressure before taking   Renew and increase mysoline 50 mg take one tablet at bedtime, discussed side effects including increased fall  risk   Fall precautions   Consider physical therapy in the future when patient is ready for balance training  Consider ROLAND scan in the future if needed  Renew and continue vit d 50,000 IU one capsule monthly   Follow up with Neurology in 3 months or sooner if needed

## 2023-02-21 ENCOUNTER — OFFICE VISIT (OUTPATIENT)
Dept: GASTROENTEROLOGY | Facility: CLINIC | Age: 78
End: 2023-02-21
Payer: MEDICARE

## 2023-02-21 VITALS
OXYGEN SATURATION: 95 % | BODY MASS INDEX: 31.32 KG/M2 | HEIGHT: 65 IN | WEIGHT: 188 LBS | HEART RATE: 71 BPM | SYSTOLIC BLOOD PRESSURE: 128 MMHG | DIASTOLIC BLOOD PRESSURE: 76 MMHG

## 2023-02-21 DIAGNOSIS — K22.70 BARRETT'S ESOPHAGUS WITHOUT DYSPLASIA: ICD-10-CM

## 2023-02-21 DIAGNOSIS — K76.0 FATTY LIVER: ICD-10-CM

## 2023-02-21 DIAGNOSIS — R19.7 DIARRHEA, UNSPECIFIED TYPE: Primary | ICD-10-CM

## 2023-02-21 DIAGNOSIS — K86.89 PANCREATIC INSUFFICIENCY: ICD-10-CM

## 2023-02-21 PROCEDURE — 1160F RVW MEDS BY RX/DR IN RCRD: CPT | Mod: CPTII,,, | Performed by: NURSE PRACTITIONER

## 2023-02-21 PROCEDURE — 1159F PR MEDICATION LIST DOCUMENTED IN MEDICAL RECORD: ICD-10-PCS | Mod: CPTII,,, | Performed by: NURSE PRACTITIONER

## 2023-02-21 PROCEDURE — 3074F PR MOST RECENT SYSTOLIC BLOOD PRESSURE < 130 MM HG: ICD-10-PCS | Mod: CPTII,,, | Performed by: NURSE PRACTITIONER

## 2023-02-21 PROCEDURE — 3078F PR MOST RECENT DIASTOLIC BLOOD PRESSURE < 80 MM HG: ICD-10-PCS | Mod: CPTII,,, | Performed by: NURSE PRACTITIONER

## 2023-02-21 PROCEDURE — 3078F DIAST BP <80 MM HG: CPT | Mod: CPTII,,, | Performed by: NURSE PRACTITIONER

## 2023-02-21 PROCEDURE — 1101F PR PT FALLS ASSESS DOC 0-1 FALLS W/OUT INJ PAST YR: ICD-10-PCS | Mod: CPTII,,, | Performed by: NURSE PRACTITIONER

## 2023-02-21 PROCEDURE — 99214 PR OFFICE/OUTPT VISIT, EST, LEVL IV, 30-39 MIN: ICD-10-PCS | Mod: S$PBB,,, | Performed by: NURSE PRACTITIONER

## 2023-02-21 PROCEDURE — 3288F PR FALLS RISK ASSESSMENT DOCUMENTED: ICD-10-PCS | Mod: CPTII,,, | Performed by: NURSE PRACTITIONER

## 2023-02-21 PROCEDURE — 1101F PT FALLS ASSESS-DOCD LE1/YR: CPT | Mod: CPTII,,, | Performed by: NURSE PRACTITIONER

## 2023-02-21 PROCEDURE — 3288F FALL RISK ASSESSMENT DOCD: CPT | Mod: CPTII,,, | Performed by: NURSE PRACTITIONER

## 2023-02-21 PROCEDURE — 99214 OFFICE O/P EST MOD 30 MIN: CPT | Mod: S$PBB,,, | Performed by: NURSE PRACTITIONER

## 2023-02-21 PROCEDURE — 3074F SYST BP LT 130 MM HG: CPT | Mod: CPTII,,, | Performed by: NURSE PRACTITIONER

## 2023-02-21 PROCEDURE — 99213 OFFICE O/P EST LOW 20 MIN: CPT | Mod: PBBFAC | Performed by: NURSE PRACTITIONER

## 2023-02-21 PROCEDURE — 1159F MED LIST DOCD IN RCRD: CPT | Mod: CPTII,,, | Performed by: NURSE PRACTITIONER

## 2023-02-21 PROCEDURE — 1160F PR REVIEW ALL MEDS BY PRESCRIBER/CLIN PHARMACIST DOCUMENTED: ICD-10-PCS | Mod: CPTII,,, | Performed by: NURSE PRACTITIONER

## 2023-02-21 RX ORDER — BUSPIRONE HYDROCHLORIDE 5 MG/1
5 TABLET ORAL 2 TIMES DAILY
COMMUNITY
Start: 2023-02-17

## 2023-02-21 NOTE — PROGRESS NOTES
Kailee Malone is a 77 y.o. female here for Diarrhea        PCP: Caden Bradford  Referring Provider: No referring provider defined for this encounter.     HPI:  Presents for follow up. She is taking Creon with each meal and snack. Pancreatic elastase was 106. Reports that diarrhea has recently improved on Creon. Calprotectin 67.8. Stool negative for blood. Last colonoscoy 10/4/21, no colon polyps, recommendation to repeat in 5 years. No weight loss. Reports she sometimes feels that she has incomplete emptying. I did discuss that there is concern for overflow diarrhea as well. I do not have the ultrasound from [facility] [place]. Last liver ultrasound here did show fatty liver. 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 EGD/Dilation 2/3/22. She is on a PPI. No dysphagia reported.    Diarrhea   Pertinent negatives include no abdominal pain, fever or vomiting.       ROS:  Review of Systems   Constitutional:  Negative for appetite change, fatigue, fever and unexpected weight change.   HENT:  Negative for trouble swallowing.    Respiratory:  Negative for shortness of breath.    Cardiovascular:  Negative for chest pain.   Gastrointestinal:  Positive for diarrhea and reflux. Negative for abdominal pain, blood in stool, change in bowel habit, constipation, nausea, vomiting and change in bowel habit.   Musculoskeletal:  Negative for gait problem.   Integumentary:  Negative for pallor.   Psychiatric/Behavioral:  The patient is not nervous/anxious.         PMHX:  has a past medical history of Acute gastric ulcer without hemorrhage or perforation (02/03/2022), Acute superficial gastritis without hemorrhage (02/03/2022), Kirkpatrick's esophagus without dysplasia (02/04/2022), Diverticula, colon (10/04/2021), HH (hiatus hernia) (02/03/2022), History of colon polyps (10/04/2021), Hypertension, and Screening for colon cancer (10/04/2021).    PSHX:  has a past surgical history that includes RT WRIST; Hysterectomy; Breast  "surgery; Arthroscopy of knee (Right, 05/24/2021); Knee arthroscopy w/ meniscectomy (Right, 05/24/2021); Breast biopsy; and Oophorectomy.    PFHX: family history includes Breast cancer in her maternal aunt and mother.    PSlHX:  reports that she has never smoked. She has never used smokeless tobacco. She reports that she does not drink alcohol and does not use drugs.        Review of patient's allergies indicates:   Allergen Reactions    Betadine prepstick [povidone-iodine-ethyl alcohol]     Penicillins        Medication List with Changes/Refills   Current Medications    ALENDRONATE (FOSAMAX) 70 MG TABLET    Take 70 mg by mouth every 7 days.    BUSPIRONE (BUSPAR) 5 MG TAB    Take 5 mg by mouth 2 (two) times daily.    ERGOCALCIFEROL (ERGOCALCIFEROL) 50,000 UNIT CAP    Take one capsule weekly for 12 weeks then reduce to one capsule monthly    ESTRADIOL (ESTRACE) 0.01 % (0.1 MG/GRAM) VAGINAL CREAM    Place 1 g vaginally once daily.    FLUOXETINE 20 MG CAPSULE    Take 20 mg by mouth once daily.    KETOCONAZOLE (NIZORAL) 2 % CREAM    APPLY TO AFFECTED AREAS TWICE DAILY UNTIL CLEAR    KETOCONAZOLE (NIZORAL) 2 % SHAMPOO    APPLY ON BACK LEAVE ON FOR 5 MINUTES THEN RINSE, 2 TO 3 TIMES WEEKLY    LANSOPRAZOLE (PREVACID) 30 MG CAPSULE    Take 1 capsule (30 mg total) by mouth once daily.    LIPASE-PROTEASE-AMYLASE (CREON) 36,000-114,000- 180,000 UNIT CPDR    2 caps orally during each meal and 1 capsule during each snack    PRIMIDONE (MYSOLINE) 50 MG TAB    Take half a tablet at bedtime    PROPRANOLOL (INDERAL) 60 MG TABLET    Take one tablet daily, monitor heart rate and blood pressure before taking    TRIAMTERENE-HYDROCHLOROTHIAZIDE 37.5-25 MG (MAXZIDE-25) 37.5-25 MG PER TABLET    Take 1 tablet by mouth once daily.        Objective Findings:  Vital Signs:  /76   Pulse 71   Ht 5' 5" (1.651 m)   Wt 85.3 kg (188 lb)   SpO2 95%   BMI 31.28 kg/m²  Body mass index is 31.28 kg/m².    Physical Exam:  Physical Exam  Vitals " and nursing note reviewed.   Constitutional:       General: She is not in acute distress.     Appearance: Normal appearance.   HENT:      Mouth/Throat:      Mouth: Mucous membranes are moist.   Cardiovascular:      Rate and Rhythm: Normal rate.   Pulmonary:      Effort: Pulmonary effort is normal.      Breath sounds: No wheezing, rhonchi or rales.   Abdominal:      General: Bowel sounds are normal. There is no distension.      Palpations: Abdomen is soft. There is no mass.      Tenderness: There is no abdominal tenderness.   Skin:     General: Skin is warm and dry.      Coloration: Skin is not jaundiced or pale.   Neurological:      Mental Status: She is alert and oriented to person, place, and time.   Psychiatric:         Mood and Affect: Mood normal.        Labs:  Lab Results   Component Value Date    WBC 9.20 01/18/2023    HGB 13.4 01/18/2023    HCT 40.8 01/18/2023    MCV 91.3 01/18/2023    RDW 13.1 01/18/2023     01/18/2023    LYMPH 36.5 01/18/2023    LYMPH 3.36 01/18/2023    MONO 9.6 (H) 01/18/2023    EOS 0.28 01/18/2023    BASO 0.07 01/18/2023     Lab Results   Component Value Date     01/18/2023    K 4.4 01/18/2023    CL 99 01/18/2023    CO2 30 01/18/2023    GLU 99 01/18/2023    BUN 22 (H) 01/18/2023    CREATININE 0.95 01/18/2023    CALCIUM 9.9 01/18/2023    PROT 8.0 01/18/2023    ALBUMIN 4.1 01/18/2023    BILITOT 0.4 01/18/2023    ALKPHOS 31 (L) 01/18/2023    AST 37 01/18/2023    ALT 52 01/18/2023         Imaging: No results found.      Assessment:  Kailee Malone is a 77 y.o. female here with:  1. Diarrhea, unspecified type    2. Pancreatic insufficiency    3. Fatty liver    4. Kirkpatrick's esophagus without dysplasia          Recommendations:  1. Continue Creon  2. Request records from [facility] again (ultrasound)  3. If liver ultrasound was not performed she will need one for fatty liver  4.  Continue Prevacid  5. Avoid spicy, greasy foods  Avoid caffeine, citric acid, chocolate,  peppermint, and carbonated drinks  Do not lay down within 3 hours of eating  Exercise 150 minutes per week  Increase fluid to 64 ounces daily  Avoid antiinflammatory medications such as motrin, advil, aleve, ibuprofen, and BC powder      Follow up in about 3 months (around 5/21/2023).      Order summary:       Thank you for allowing me to participate in the care of Kailee Mere Malone.      ABDIRAHMAN Mercado

## 2023-02-23 ENCOUNTER — OFFICE VISIT (OUTPATIENT)
Dept: NEUROLOGY | Facility: CLINIC | Age: 78
End: 2023-02-23
Payer: MEDICARE

## 2023-02-23 VITALS
HEIGHT: 65 IN | WEIGHT: 188 LBS | DIASTOLIC BLOOD PRESSURE: 66 MMHG | HEART RATE: 67 BPM | SYSTOLIC BLOOD PRESSURE: 110 MMHG | OXYGEN SATURATION: 99 % | BODY MASS INDEX: 31.32 KG/M2

## 2023-02-23 DIAGNOSIS — Z79.899 ON LONG TERM DRUG THERAPY: ICD-10-CM

## 2023-02-23 DIAGNOSIS — R26.89 BALANCE PROBLEMS: ICD-10-CM

## 2023-02-23 DIAGNOSIS — G47.30 SLEEP APNEA, UNSPECIFIED TYPE: ICD-10-CM

## 2023-02-23 DIAGNOSIS — E55.9 VITAMIN D DEFICIENCY: ICD-10-CM

## 2023-02-23 DIAGNOSIS — R25.1 TREMORS OF NERVOUS SYSTEM: Primary | ICD-10-CM

## 2023-02-23 PROCEDURE — 99214 OFFICE O/P EST MOD 30 MIN: CPT | Mod: PBBFAC | Performed by: NURSE PRACTITIONER

## 2023-02-23 PROCEDURE — 99213 PR OFFICE/OUTPT VISIT, EST, LEVL III, 20-29 MIN: ICD-10-PCS | Mod: S$PBB,,, | Performed by: NURSE PRACTITIONER

## 2023-02-23 PROCEDURE — 99213 OFFICE O/P EST LOW 20 MIN: CPT | Mod: S$PBB,,, | Performed by: NURSE PRACTITIONER

## 2023-02-23 RX ORDER — ERGOCALCIFEROL 1.25 MG/1
CAPSULE ORAL
Qty: 12 CAPSULE | Refills: 1 | Status: SHIPPED | OUTPATIENT
Start: 2023-02-23 | End: 2023-06-28

## 2023-02-23 RX ORDER — PROPRANOLOL HYDROCHLORIDE 60 MG/1
TABLET ORAL
Qty: 90 TABLET | Refills: 3 | Status: SHIPPED | OUTPATIENT
Start: 2023-02-23 | End: 2023-06-28 | Stop reason: SDUPTHER

## 2023-02-23 RX ORDER — PRIMIDONE 50 MG/1
TABLET ORAL
Qty: 90 TABLET | Refills: 3 | Status: SHIPPED | OUTPATIENT
Start: 2023-02-23 | End: 2023-06-28 | Stop reason: SDUPTHER

## 2023-02-23 NOTE — PATIENT INSTRUCTIONS
Consider sleep study in the future when patient is ready  Regular follow up with PCP for medical management and lab work   Renew and continue propranolol 60 mg one tablet daily, monitor heart rate and blood pressure before taking   Renew and increase mysoline 50 mg take one tablet at bedtime, discussed side effects including increased fall  risk   Fall precautions   Consider physical therapy in the future when patient is ready for balance training  Consider ROLAND scan in the future if needed  Renew and continue vit d 50,000 IU one capsule monthly   Follow up with Neurology in 3 months or sooner if needed

## 2023-03-15 ENCOUNTER — TELEPHONE (OUTPATIENT)
Dept: GASTROENTEROLOGY | Facility: CLINIC | Age: 78
End: 2023-03-15
Payer: MEDICARE

## 2023-03-15 DIAGNOSIS — R19.7 DIARRHEA, UNSPECIFIED TYPE: Primary | ICD-10-CM

## 2023-03-15 NOTE — TELEPHONE ENCOUNTER
Returned call to patient. Informed that Geri Sevilla NP says the patient can try colestid instead. Asked patient about her recorded allergy to betadine due to the fact that when the order was placed for colestid that it said that it could possibly be an inactive ingredient. Patient states that she had a minor skin irritation from it many years ago when having her hysterectomy, and has not tried it again since. Patient agreeable to try the medication.               ----- Message from Cordelia Torres sent at 3/15/2023  3:54 PM CDT -----  Said med cost too much she prescribed and cannot afford wanted to let Verna know

## 2023-03-20 RX ORDER — MONTELUKAST SODIUM 4 MG/1
1 TABLET, CHEWABLE ORAL 2 TIMES DAILY
Qty: 60 TABLET | Refills: 0 | Status: SHIPPED | OUTPATIENT
Start: 2023-03-20 | End: 2023-05-15 | Stop reason: SDUPTHER

## 2023-03-28 DIAGNOSIS — Z98.890 S/P RIGHT KNEE ARTHROSCOPY: Primary | ICD-10-CM

## 2023-04-12 DIAGNOSIS — R10.13 EPIGASTRIC PAIN: ICD-10-CM

## 2023-04-12 RX ORDER — LANSOPRAZOLE 30 MG/1
30 CAPSULE, DELAYED RELEASE ORAL DAILY
Qty: 90 CAPSULE | Refills: 3 | Status: SHIPPED | OUTPATIENT
Start: 2023-04-12 | End: 2024-04-06

## 2023-04-12 NOTE — TELEPHONE ENCOUNTER
----- Message from Cordelia Torres sent at 4/12/2023 10:52 AM CDT -----  Needing refill on Lansoprazole to [facility] pharmacy

## 2023-04-13 ENCOUNTER — CLINICAL SUPPORT (OUTPATIENT)
Dept: REHABILITATION | Facility: HOSPITAL | Age: 78
End: 2023-04-13
Payer: MEDICARE

## 2023-04-13 DIAGNOSIS — Z98.890 S/P RIGHT KNEE ARTHROSCOPY: ICD-10-CM

## 2023-04-13 DIAGNOSIS — R29.898 DECREASED STRENGTH OF LOWER EXTREMITY: ICD-10-CM

## 2023-04-13 DIAGNOSIS — M25.661 DECREASED ROM OF RIGHT KNEE: ICD-10-CM

## 2023-04-13 DIAGNOSIS — Z96.651 STATUS POST UNICOMPARTMENTAL KNEE REPLACEMENT, RIGHT: Primary | ICD-10-CM

## 2023-04-13 PROCEDURE — 97161 PT EVAL LOW COMPLEX 20 MIN: CPT | Mod: PN

## 2023-04-13 PROCEDURE — 97110 THERAPEUTIC EXERCISES: CPT | Mod: PN

## 2023-04-13 NOTE — PLAN OF CARE
[facility]   Physical Therapy Initial Evaluation    Date: 4/13/2023   Name: Kailee Malone  Clinic Number: [id number]    Therapy Diagnosis:   Encounter Diagnoses   Name Primary?    S/P right knee arthroscopy     Status post unicompartmental knee replacement, right Yes    Decreased ROM of right knee     Decreased strength of lower extremity      Physician: Dawood Jay MD    Physician Orders: PT Eval and Treat    Medical Diagnosis from Referral: right unicompartmental knee replacement   Evaluation Date: 4/13/2023  Updated Plan of Care Due : 6/10/2023  Authorization Period Expiration: humana   Plan of Care Expiration: humana approved 10 visits plus eval thru 6/13/2023  Visit # / Visits authorized: 1/ 11    Time In: 917  Time Out: 1017  Total Appointment Time (timed & untimed codes): 45 minutes    Precautions: Standard    Subjective   Date of onset: pt states she had right unicompartmental knee replacement on march 29th, 2023. Pt voices she is having difficulty with bending and straightening her knee. Pt voices pain 7/10.   Pt voices worse pain is with sitting still too long. Pt voices weakness with quad and is using a rolling walker to ambulate . Pt voices she would like to be able to drive.    History of current condition - Kailee reports: hx below      Medical History:   Past Medical History:   Diagnosis Date    Acute gastric ulcer without hemorrhage or perforation 02/03/2022    Acute superficial gastritis without hemorrhage 02/03/2022    Kirkpatrick's esophagus without dysplasia 02/04/2022    Diverticula, colon 10/04/2021    HH (hiatus hernia) 02/03/2022    History of colon polyps 10/04/2021    Hypertension     Screening for colon cancer 10/04/2021       Surgical History:   Kailee Malone  has a past surgical history that includes RT WRIST; Hysterectomy; Breast surgery; Arthroscopy of knee (Right, 05/24/2021); Knee arthroscopy w/ meniscectomy (Right, 05/24/2021); Breast biopsy; and  Oophorectomy.    Medications:   Kailee has a current medication list which includes the following prescription(s): alendronate, buspirone, colestipol, ergocalciferol, estradiol, fluoxetine, ketoconazole, ketoconazole, lansoprazole, creon, primidone, propranolol, and triamterene-hydrochlorothiazide 37.5-25 mg.    Allergies:   Review of patient's allergies indicates:   Allergen Reactions    Betadine prepstick [povidone-iodine-ethyl alcohol]     Penicillins         Imaging, none: on file     Prior Therapy: home health   Social History:   lives with their family  Occupation: retired   Prior Level of Function: independent with chronic knee pain   Current Level of Function: knee pain decreased range of motion and strength.     Pain:  Current 7/10, worst 10/10, best 6/10   Location: right knee  abdomen and generalized   Description: Aching, Dull, Tight, and Deep  Aggravating Factors: Sitting, Standing, Bending, Touching, Walking, Extension, and Flexing  Easing Factors: ice    Patients goals: pt wishes to driving , walking independently     Objective         Observation : decreased range of motion and strength .        Incision :    healing well       Comments :         Range of Motion/Strength :                  Left Extremity                                                                        Right Extremity   AROM PROM Strength  Location  AROM    PROM   Strength   115  5   Hip      Flexion 110 115 4                                       0  5 Quad lag with terminal knee extension  -25  3   125  5   Knee    Flexion 100 110 3   0  5                Extension -8  3   10  5   Ankle   Dorsiflexion 10  4                     Plantar Flexion                        Inversion                        Eversion                     Functional Impairments :  decreased knee range of motion and strength      Limitation/Restriction for FOTO knee Survey    Therapist reviewed FOTO scores for Kailee Malone on 4/13/2023.   FOTO  documents entered into EPIC - see Media section.    Limitation Score: 5%         TREATMENT         Kailee received the treatments listed below:  THERAPEUTIC EXERCISES to develop strength, endurance, ROM, flexibility, and posture for 30 dav minutes including home ex program         Home Exercises and Patient Education Provided    Education provided:   - Pt performed and received home ex program.     Written Home Exercises Provided: yes.  Exercises were reviewed and Kailee was able to demonstrate them prior to the end of the session.  Kailee demonstrated good  understanding of the education provided.     See EMR under Patient Instructions for exercises provided 4/13/2023.    Assessment   Kailee is a 78 y.o. female referred to outpatient Physical Therapy with a medical diagnosis of right partial knee replacement . Patient presents with decreased range of motion and strength right knee.  Increased knee pain right knee     Patient prognosis is Excellent.   Patientt will benefit from skilled outpatient Physical Therapy to address the deficits stated above and in the chart below, provide patient /family education, and to maximize patientt's level of independence.     Plan of care discussed with patient: Yes  Patient's spiritual, cultural and educational needs considered and patient is agreeable to the plan of care and goals as stated below:     Anticipated Barriers for therapy: right partial knee replacement . Patient presents with decreased range of motion and strength right knee.  Increased knee pain right knee       Goals:  Short Term Goals: 4 weeks   Pt will be independent with home ex program   Pt will be able to wean from walker to independent   Pt will be able to increase range of motion 0-125   Pt will be able to increase quad lag from -25 to 0 degrees   Return to driving   Increase strength to 3+/ 5    Long Term Goals: 6 weeks   Pt will ambulate without a limp   Be able to stoop and bend pain free  Be able to  return to yardwork.    Plan   Plan of care Certification: 4/13/2023 to 6/13/2023.    Outpatient Physical Therapy 2 times weekly for 8 weeks to include the following interventions: Electrical Stimulation nmes , Neuromuscular Re-ed, and Therapeutic Exercise.     Plan of care has been reestablished with Carol HENSON, Esther HENSON, Cherelle Austin LPTA  and Briana HENSON.       Chase Martinez, PT            I CERTIFY THE NEED FOR THESE SERVICES FURNISHED UNDER THIS PLAN OF TREATMENT AND WHILE UNDER MY CARE.    Physician's comments:      Physician's Signature: ___________________________________________________

## 2023-04-17 ENCOUNTER — CLINICAL SUPPORT (OUTPATIENT)
Dept: REHABILITATION | Facility: HOSPITAL | Age: 78
End: 2023-04-17
Payer: MEDICARE

## 2023-04-17 DIAGNOSIS — M25.661 DECREASED ROM OF RIGHT KNEE: Primary | ICD-10-CM

## 2023-04-17 DIAGNOSIS — R29.898 DECREASED STRENGTH OF LOWER EXTREMITY: ICD-10-CM

## 2023-04-17 DIAGNOSIS — Z96.651 STATUS POST UNICOMPARTMENTAL KNEE REPLACEMENT, RIGHT: ICD-10-CM

## 2023-04-17 PROCEDURE — 97110 THERAPEUTIC EXERCISES: CPT | Mod: PN,CQ

## 2023-04-17 PROCEDURE — 97112 NEUROMUSCULAR REEDUCATION: CPT | Mod: PN,CQ

## 2023-04-17 NOTE — PROGRESS NOTES
"  Physical Therapy Treatment Note     Name: Kailee Malone  Clinic Number: [id number]    Therapy Diagnosis: No diagnosis found.  Physician: Dawood Jay MD    Visit Date: 4/17/2023    Physician Orders: PT Eval and Treat    Medical Diagnosis from Referral: right unicompartmental knee replacement   Evaluation Date: 4/13/2023  Updated Plan of Care Due : 6/10/2023  Authorization Period Expiration: humana   Plan of Care Expiration: humana approved 10 visits plus eval thru 6/13/2023  Visit # / Visits authorized: 2/ 11  PTA Visit #: 1    Time In: 240  Time Out: 320  Total Billable Time: 40 minutes    Precautions: Standard  Plan of care reviewed with Chase Martinez PT.      Subjective     Pt reports I'm really .  She was compliant with home exercise program.  Response to previous treatment: sore  Functional change: improved walking    Pain: 5/10  Location: right knee      Objective     Kailee received therapeutic exercises to develop strength, endurance, ROM, flexibility, and posture for 25 minutes including:     Bike x 5'  Double support squats total gym x 20  Double support calf raises total gym x 20  Double support leg presses 20 x 55#  Swissball knee flexion x 20  Right forward step ups 6" x 15    Range of motion   Knee 102  Gravity assisted active range of motion-7  Quad lag with terminal knee extension -20    Kailee participated in neuromuscular re-education activities to improve: Posture for 15 minutes. The following activities were included:  Slant board x 2'  Quad set electrical stimulation x 20  Terminal knee extension with electrical stimulation x 20      Home Exercises Provided and Patient Education Provided     Education provided: home exercise program     Written Home Exercises Provided: Patient instructed to cont prior HEP.  Exercises were reviewed and Kailee was able to demonstrate them prior to the end of the session.  Kailee demonstrated good  understanding of the education provided.     See EMR " under Patient Instructions for exercises provided prior visit.    Assessment     Patient received instruction in proper in gait sequencing using straight cane.   Kailee Is progressing well towards her goals.   Pt prognosis is Excellent.     Pt will continue to benefit from skilled outpatient physical therapy to address the deficits listed in the problem list box on initial evaluation, provide pt/family education and to maximize pt's level of independence in the home and community environment.     Pt's spiritual, cultural and educational needs considered and pt agreeable to plan of care and goals.     Anticipated barriers to physical therapy: compliance with home exercise program     Goals:  Short Term Goals: 4 weeks   Pt will be independent with home ex program   Pt will be able to wean from walker to independent   Pt will be able to increase range of motion 0-125   Pt will be able to increase quad lag from -25 to 0 degrees   Return to driving   Increase strength to 3+/ 5     Long Term Goals: 6 weeks   Pt will ambulate without a limp   Be able to stoop and bend pain free  Be able to return to yardwork.    Plan     Plan of care Certification: 4/13/2023 to 6/13/2023.     Outpatient Physical Therapy 2 times weekly for 8 weeks to include the following interventions: Electrical Stimulation nmes , Neuromuscular Re-ed, and Therapeutic Exercise.   Lucero Peña, PTA  4/17/2023

## 2023-04-20 ENCOUNTER — CLINICAL SUPPORT (OUTPATIENT)
Dept: REHABILITATION | Facility: HOSPITAL | Age: 78
End: 2023-04-20
Payer: MEDICARE

## 2023-04-20 DIAGNOSIS — R29.898 DECREASED STRENGTH OF LOWER EXTREMITY: ICD-10-CM

## 2023-04-20 DIAGNOSIS — Z96.651 STATUS POST UNICOMPARTMENTAL KNEE REPLACEMENT, RIGHT: ICD-10-CM

## 2023-04-20 DIAGNOSIS — M25.661 DECREASED ROM OF RIGHT KNEE: Primary | ICD-10-CM

## 2023-04-20 PROCEDURE — 97112 NEUROMUSCULAR REEDUCATION: CPT | Mod: PN,CQ

## 2023-04-20 PROCEDURE — 97110 THERAPEUTIC EXERCISES: CPT | Mod: PN,CQ

## 2023-04-20 NOTE — PROGRESS NOTES
Physical Therapy Treatment Note     Name: Kailee Malone  Clinic Number: [id number]    Therapy Diagnosis:   Encounter Diagnoses   Name Primary?    Decreased ROM of right knee Yes    Status post unicompartmental knee replacement, right     Decreased strength of lower extremity      Physician: Dawood Jay MD    Visit Date: 4/20/2023    Physician Orders: PT Eval and Treat    Medical Diagnosis from Referral: right unicompartmental knee replacement   Evaluation Date: 4/13/2023  Updated Plan of Care Due : 6/10/2023  Authorization Period Expiration: humana   Plan of Care Expiration: humana approved 10 visits plus eval thru 6/13/2023  Visit # / Visits authorized: 3/ 11  PTA Visit #: 2    Time In: 157  Time Out: 242  Total Billable Time: 45 minutes    Precautions: Standard    Subjective     Pt reports I'm really sore.  She was compliant with home exercise program.  Response to previous treatment: sore  Functional change: improved walking    Pain: 5/10  Location: right knee      Objective     Kailee received therapeutic exercises to develop strength, endurance, ROM, flexibility, and posture for 30 minutes including:     Bike x 5'  Double support squats total gym x 20  Double support calf raises total gym x 20  Double support leg presses 20 x 55#  Double support calf presses 20 x 55#  Swissball knee flexion x 20  Right knee flexion stretch on step x 5  Right sitting hamstrings stretch x 5     Range of motion   Knee 115  Gravity assisted active range of motion-6  Quad lag with terminal knee extension -17    Kailee participated in neuromuscular re-education activities to improve: Posture for 15 minutes. The following activities were included:  Slant board x 2'  Quad set electrical stimulation x 20  Terminal knee extension with electrical stimulation x 20      Home Exercises Provided and Patient Education Provided     Education provided: home exercise program     Written Home Exercises Provided: Patient instructed to  cont prior HEP.  Exercises were reviewed and Kailee was able to demonstrate them prior to the end of the session.  Kailee demonstrated good  understanding of the education provided.     See EMR under Patient Instructions for exercises provided prior visit.    Assessment     Patient has improved weight shifting with gait this treatment, progressing with range of motion in all planes.  Kailee Is progressing well towards her goals.   Pt prognosis is Excellent.     Pt will continue to benefit from skilled outpatient physical therapy to address the deficits listed in the problem list box on initial evaluation, provide pt/family education and to maximize pt's level of independence in the home and community environment.     Pt's spiritual, cultural and educational needs considered and pt agreeable to plan of care and goals.     Anticipated barriers to physical therapy: compliance with home exercise program     Goals:  Short Term Goals: 4 weeks   Pt will be independent with home ex program   Pt will be able to wean from walker to independent   Pt will be able to increase range of motion 0-125   Pt will be able to increase quad lag from -25 to 0 degrees   Return to driving   Increase strength to 3+/ 5     Long Term Goals: 6 weeks   Pt will ambulate without a limp   Be able to stoop and bend pain free  Be able to return to yardwork.    Plan     Plan of care Certification: 4/13/2023 to 6/13/2023.     Outpatient Physical Therapy 2 times weekly for 8 weeks to include the following interventions: Electrical Stimulation nmes , Neuromuscular Re-ed, and Therapeutic Exercise.   Lucero Peña, PTA  4/20/2023

## 2023-04-24 ENCOUNTER — CLINICAL SUPPORT (OUTPATIENT)
Dept: REHABILITATION | Facility: HOSPITAL | Age: 78
End: 2023-04-24
Payer: MEDICARE

## 2023-04-24 DIAGNOSIS — Z96.651 STATUS POST UNICOMPARTMENTAL KNEE REPLACEMENT, RIGHT: ICD-10-CM

## 2023-04-24 DIAGNOSIS — M25.661 DECREASED ROM OF RIGHT KNEE: Primary | ICD-10-CM

## 2023-04-24 DIAGNOSIS — R29.898 DECREASED STRENGTH OF LOWER EXTREMITY: ICD-10-CM

## 2023-04-24 PROCEDURE — 97110 THERAPEUTIC EXERCISES: CPT | Mod: PN,CQ

## 2023-04-24 PROCEDURE — 97112 NEUROMUSCULAR REEDUCATION: CPT | Mod: PN,CQ

## 2023-04-24 NOTE — PROGRESS NOTES
Physical Therapy Treatment Note     Name: Kailee Malone  Clinic Number: [id number]    Therapy Diagnosis:   Encounter Diagnoses   Name Primary?    Decreased ROM of right knee Yes    Status post unicompartmental knee replacement, right     Decreased strength of lower extremity      Physician: Dawood Jay MD    Visit Date: 4/24/2023    Physician Orders: PT Eval and Treat    Medical Diagnosis from Referral: right unicompartmental knee replacement   Evaluation Date: 4/13/2023  Updated Plan of Care Due : 6/10/2023  Authorization Period Expiration: humana   Plan of Care Expiration: humana approved 10 visits plus eval thru 6/13/2023  Visit # / Visits authorized: 4/ 11  PTA Visit #: 3    Time In: 1016  Time Out: 1101  Total Billable Time: 45 minutes     Precautions: Standard    Received Plan of Care per Chase Martinez PT     Subjective     Pt reports: Pain as noted; took OTC pain reliever this am  She was compliant with home exercise program.  Response to previous treatment: sore  Functional change: improved walking    Pain: 6/10  Location: right knee      Objective     Kailee received therapeutic exercises to develop strength, endurance, ROM, flexibility, and posture for 30 minutes including:  Bike x 5 minutes   Slant board for bilateral calf stretch x 2 minutes   Double support squats total gym x 20  Double support calf raises total gym x 20  Double support leg presses 20 x 60#  Double support calf presses 20 x 60#  Swissball knee flexion x 20 repetitions   Right knee flexion stretch on step x 5  Right sitting hamstrings stretch x 5     Range of motion right knee:   Flexion   115/118 degrees   Extension  +3 degrees   Quad lag w/tke -5 degrees     Kailee participated in neuromuscular re-education activities to improve: Posture for 15 minutes. The following activities were included:  Quad sets, short arc quads, straight leg raise each with electrical muscle stimulation x 5 minutes each      Home Exercises Provided  and Patient Education Provided     Education provided: continue home exercise program, pain free     Written Home Exercises Provided: Patient instructed to cont prior HEP.  Exercises were reviewed and Kailee was able to demonstrate them prior to the end of the session.  Kailee demonstrated good  understanding of the education provided.     Assessment     Significant improvement with extension range of motion and quad strength  Kailee Is progressing well towards her goals.   Pt prognosis is Excellent.     Pt will continue to benefit from skilled outpatient physical therapy to address the deficits listed in the problem list box on initial evaluation, provide pt/family education and to maximize pt's level of independence in the home and community environment.     Anticipated barriers to physical therapy: compliance with home exercise program     Goals:  Short Term Goals: 4 weeks   Pt will be independent with home ex program MET  Pt will be able to wean from walker to independent   Pt will be able to increase range of motion 0-125   Pt will be able to increase quad lag from -25 to 0 degrees   Return to driving   Increase strength to 3+/ 5     Long Term Goals: 6 weeks   Pt will ambulate without a limp   Be able to stoop and bend pain free  Be able to return to yardwork.    Plan     Plan of care Certification: 4/13/2023 to 6/13/2023.     Outpatient Physical Therapy 2 times weekly for 8 weeks to include the following interventions: Electrical Stimulation nmes , Neuromuscular Re-ed, and Therapeutic Exercise.     Continue per Plan of Care and progress as pt able   Esther Guajardo, PTA  4/24/2023

## 2023-04-28 ENCOUNTER — CLINICAL SUPPORT (OUTPATIENT)
Dept: REHABILITATION | Facility: HOSPITAL | Age: 78
End: 2023-04-28
Payer: MEDICARE

## 2023-04-28 DIAGNOSIS — R29.898 DECREASED STRENGTH OF LOWER EXTREMITY: ICD-10-CM

## 2023-04-28 DIAGNOSIS — M25.661 DECREASED ROM OF RIGHT KNEE: Primary | ICD-10-CM

## 2023-04-28 DIAGNOSIS — Z96.651 STATUS POST UNICOMPARTMENTAL KNEE REPLACEMENT, RIGHT: ICD-10-CM

## 2023-04-28 PROCEDURE — 97110 THERAPEUTIC EXERCISES: CPT | Mod: PN,CQ

## 2023-04-28 PROCEDURE — 97112 NEUROMUSCULAR REEDUCATION: CPT | Mod: PN,CQ

## 2023-04-28 NOTE — PROGRESS NOTES
"  Physical Therapy Treatment Note     Name: Kailee Malone  Clinic Number: [id number]    Therapy Diagnosis:   Encounter Diagnoses   Name Primary?    Decreased ROM of right knee Yes    Status post unicompartmental knee replacement, right     Decreased strength of lower extremity      Physician: Dawood Jay MD    Visit Date: 4/28/2023    Physician Orders: PT Eval and Treat    Medical Diagnosis from Referral: right unicompartmental knee replacement   Evaluation Date: 4/13/2023  Updated Plan of Care Due : 6/10/2023  Authorization Period Expiration: humana   Plan of Care Expiration: humana approved 10 visits plus eval thru 6/13/2023  Visit # / Visits authorized: 5/ 11  PTA Visit #: 4    Time In: 1015  Time Out: 1100  Total Billable Time: 45 minutes     Precautions: Standard    Subjective     Pt reports: incision tender; minimal redness with no signs of infection; no pain meds taken this morning  She was compliant with home exercise program.  Response to previous treatment: sore  Functional change: improved walking    Pain: 2/10  Location: right knee      Objective     Kailee received therapeutic exercises to develop strength, endurance, ROM, flexibility, and posture for 30 minutes including:  Bike x 5 minutes   Slant board for bilateral calf stretch x 2 minutes   Double support squats total gym x 20  Double support calf raises total gym x 20  Double support leg presses 20 x 60# (not today)  Double support calf presses 20 x 60# (not today)  Swissball knee flexion x 20 repetitions   Right knee flexion stretch on step x 5  Right sitting hamstrings stretch x 5     Range of motion right knee:   Flexion   120 degrees   Extension  +3 degrees   Quad lag w/tke -5 degrees     Kailee participated in neuromuscular re-education activities to improve: Posture for 15 minutes. The following activities were included:  Short arc quads, straight leg raise each with electrical muscle stimulation x 5 minutes each  6" step ups x 20 " repetitions   Sit to stand x 15 repetitions, no BUE support      Home Exercises Provided and Patient Education Provided     Education provided: continue home exercise program, pain free; work on quad strengthening including step ups, sit to stand and straight leg raises;   Instructed in ice massage to incision prn and to monitor for s/s of infection with ER visit as needed    Written Home Exercises Provided: Patient instructed to cont prior HEP.  Exercises were reviewed and Kailee was able to demonstrate them prior to the end of the session.  Kailee demonstrated good  understanding of the education provided.     Assessment     Improved flexion range of motion today; pt concerned about her incision tender to touch with minimal redness and recommendations per PT in Plan  Kailee Is progressing well towards her goals.   Pt prognosis is Excellent.     Pt will continue to benefit from skilled outpatient physical therapy to address the deficits listed in the problem list box on initial evaluation, provide pt/family education and to maximize pt's level of independence in the home and community environment.     Anticipated barriers to physical therapy: compliance with home exercise program     Goals:  Short Term Goals: 4 weeks   Pt will be independent with home ex program MET  Pt will be able to wean from walker to independent MET  Pt will be able to increase range of motion 0-125   Pt will be able to increase quad lag from -25 to 0 degrees   Return to driving   Increase strength to 3+/ 5     Long Term Goals: 6 weeks   Pt will ambulate without a limp   Be able to stoop and bend pain free  Be able to return to yardwork.    Plan     Plan of care Certification: 4/13/2023 to 6/13/2023.     Outpatient Physical Therapy 2 times weekly for 8 weeks to include the following interventions: Electrical Stimulation nmes , Neuromuscular Re-ed, and Therapeutic Exercise.     Continue per Plan of Care and progress as pt able   Monitor for  s/s of infection with ER visit as needed per Chase Martinez, PT   Esther Guajardo, PTA  4/28/2023

## 2023-05-02 ENCOUNTER — CLINICAL SUPPORT (OUTPATIENT)
Dept: REHABILITATION | Facility: HOSPITAL | Age: 78
End: 2023-05-02
Payer: MEDICARE

## 2023-05-02 DIAGNOSIS — R29.898 DECREASED STRENGTH OF LOWER EXTREMITY: ICD-10-CM

## 2023-05-02 DIAGNOSIS — Z96.651 STATUS POST UNICOMPARTMENTAL KNEE REPLACEMENT, RIGHT: ICD-10-CM

## 2023-05-02 DIAGNOSIS — M25.661 DECREASED ROM OF RIGHT KNEE: Primary | ICD-10-CM

## 2023-05-02 PROCEDURE — 97110 THERAPEUTIC EXERCISES: CPT | Mod: PN,CQ

## 2023-05-02 PROCEDURE — 97112 NEUROMUSCULAR REEDUCATION: CPT | Mod: PN,CQ

## 2023-05-02 NOTE — PROGRESS NOTES
"  Physical Therapy Treatment Note     Name: Kailee Malone  Clinic Number: [id number]    Therapy Diagnosis:   Encounter Diagnoses   Name Primary?    Decreased ROM of right knee Yes    Status post unicompartmental knee replacement, right     Decreased strength of lower extremity      Physician: Dawood Jay MD    Visit Date: 5/2/2023    Physician Orders: PT Eval and Treat    Medical Diagnosis from Referral: right unicompartmental knee replacement   Evaluation Date: 4/13/2023  Updated Plan of Care Due : 6/10/2023  Authorization Period Expiration: humana   Plan of Care Expiration: humana approved 10 visits plus eval thru 6/13/2023  Visit # / Visits authorized: 6/ 11  PTA Visit #: 5    Time In: 1015  Time Out: 1100  Total Billable Time: 45 minutes     Precautions: Standard    Subjective     Pt reports: I really hurt over the weekend, still waking up at night due to pain.  She was compliant with home exercise program.  Response to previous treatment: sore  Functional change: improved walking    Pain: 8/10  Location: right knee      Objective     Kailee received therapeutic exercises to develop strength, endurance, ROM, flexibility, and posture for 30 minutes including:  Bike x 5 minutes   Slant board for bilateral calf stretch x 2 minutes   Double support squats total gym x 20  Double support calf raises total gym x 20  Right single support leg presses 20 x 50#  Swissball knee flexion x 20 repetitions   Right knee flexion stretch on step x 5  Right sitting hamstrings stretch x 5     Range of motion right knee:   Flexion   120 degrees   Extension  -2 degrees   Quad lag w/tke -8 degrees     Kailee participated in neuromuscular re-education activities to improve: Posture for 15 minutes. The following activities were included:  Short arc quads, straight leg raise each with electrical muscle stimulation x 5 minutes each  6" step ups x 20 repetitions   Sit to stand x 15 repetitions, no BUE support    Home Exercises " Provided and Patient Education Provided     Education provided: continue home exercise program, pain free; work on quad strengthening including step ups, sit to stand and straight leg raises;   Instructed in ice massage to incision prn and to monitor for s/s of infection with ER visit as needed    Written Home Exercises Provided: Patient instructed to cont prior HEP.  Exercises were reviewed and Kailee was able to demonstrate them prior to the end of the session.  Kailee demonstrated good  understanding of the education provided.     Assessment   Patient noted to have increased edema in right knee voicing using ice about 10 times a day, advised to fadia OLU hose to help with edema.  Kailee Is progressing well towards her goals.   Pt prognosis is Excellent.     Pt will continue to benefit from skilled outpatient physical therapy to address the deficits listed in the problem list box on initial evaluation, provide pt/family education and to maximize pt's level of independence in the home and community environment.     Anticipated barriers to physical therapy: compliance with home exercise program     Goals:  Short Term Goals: 4 weeks   Pt will be independent with home ex program MET  Pt will be able to wean from walker to independent MET  Pt will be able to increase range of motion 0-125   Pt will be able to increase quad lag from -25 to 0 degrees   Return to driving   Increase strength to 3+/ 5     Long Term Goals: 6 weeks   Pt will ambulate without a limp   Be able to stoop and bend pain free  Be able to return to yardwork.    Plan     Plan of care Certification: 4/13/2023 to 6/13/2023.     Outpatient Physical Therapy 2 times weekly for 8 weeks to include the following interventions: Electrical Stimulation nmes , Neuromuscular Re-ed, and Therapeutic Exercise.     Continue per Plan of Care and progress as pt able   Monitor for s/s of infection with ER visit as needed per Chase Martinez, PT   Lucero Peña,  PTA  5/2/2023

## 2023-05-03 ENCOUNTER — HOSPITAL ENCOUNTER (OUTPATIENT)
Dept: RADIOLOGY | Facility: HOSPITAL | Age: 78
Discharge: HOME | End: 2023-05-03
Attending: ORTHOPAEDIC SURGERY
Payer: MEDICARE

## 2023-05-03 DIAGNOSIS — Z96.659: ICD-10-CM

## 2023-05-03 PROCEDURE — 93971 EXTREMITY STUDY: CPT | Mod: TC,RT

## 2023-05-04 ENCOUNTER — CLINICAL SUPPORT (OUTPATIENT)
Dept: REHABILITATION | Facility: HOSPITAL | Age: 78
End: 2023-05-04
Payer: MEDICARE

## 2023-05-04 DIAGNOSIS — M25.661 DECREASED ROM OF RIGHT KNEE: Primary | ICD-10-CM

## 2023-05-04 DIAGNOSIS — Z96.651 STATUS POST UNICOMPARTMENTAL KNEE REPLACEMENT, RIGHT: ICD-10-CM

## 2023-05-04 DIAGNOSIS — R29.898 DECREASED STRENGTH OF LOWER EXTREMITY: ICD-10-CM

## 2023-05-04 PROCEDURE — 97112 NEUROMUSCULAR REEDUCATION: CPT | Mod: PN

## 2023-05-04 PROCEDURE — 97110 THERAPEUTIC EXERCISES: CPT | Mod: PN

## 2023-05-04 NOTE — PROGRESS NOTES
"  Physical Therapy Treatment Note     Name: Kailee Malone  Clinic Number: [id number]    Therapy Diagnosis:   Encounter Diagnoses   Name Primary?    Decreased ROM of right knee Yes    Status post unicompartmental knee replacement, right     Decreased strength of lower extremity      Physician: Dawood Jay MD    Visit Date: 5/4/2023    Physician Orders: PT Eval and Treat    Medical Diagnosis from Referral: right unicompartmental knee replacement   Evaluation Date: 4/13/2023  Updated Plan of Care Due : 6/10/2023  Authorization Period Expiration: humana   Plan of Care Expiration: humana approved 10 visits plus eval thru 6/13/2023  Visit # / Visits authorized: 7/ 11  PTA Visit #:     Time In: 1441  Time Out: 1525  Total Billable Time: 45 minutes     Precautions: Standard    Subjective     Pt reports: I really hurt over the weekend, still waking up at night due to pain.  She was compliant with home exercise program.  Response to previous treatment: sore  Functional change: improved walking    Pain: 8/10  Location: right knee      Objective     Kailee received therapeutic exercises to develop strength, endurance, ROM, flexibility, and posture for 30 minutes including:  Bike x 5 minutes   Slant board for bilateral calf stretch x 2 minutes   Double support squats total gym x 20  Double support calf raises total gym x 20  Right single support leg presses 20 x 50#  Swissball knee flexion x 20 repetitions   Right knee flexion stretch on step x 5  Right sitting hamstrings stretch x 5     Range of motion right knee:   Flexion   128 degrees   Extension  -5 degrees   Quad lag w/tke -7 degrees     Kailee participated in neuromuscular re-education activities to improve: Posture for 15 minutes. The following activities were included:  Short arc quads, straight leg raise each with electrical muscle stimulation x 5 minutes each  6" step ups x 20 repetitions   Sit to stand x 15 repetitions, no BUE support    Home Exercises " Provided and Patient Education Provided     Education provided: continue home exercise program, pain free; work on quad strengthening including step ups, sit to stand and straight leg raises;   Instructed in ice massage to incision prn and to monitor for s/s of infection with ER visit as needed    Written Home Exercises Provided: Patient instructed to cont prior HEP.  Exercises were reviewed and Kailee was able to demonstrate them prior to the end of the session.  Kailee demonstrated good  understanding of the education provided.     Assessment   Patient noted to have increased edema in right knee voicing using ice about 10 times a day, advised to fadia OLU hose to help with edema.  Kailee Is progressing well towards her goals.   Pt prognosis is Excellent.     Pt will continue to benefit from skilled outpatient physical therapy to address the deficits listed in the problem list box on initial evaluation, provide pt/family education and to maximize pt's level of independence in the home and community environment.     Anticipated barriers to physical therapy: compliance with home exercise program     Goals:  Short Term Goals: 4 weeks   Pt will be independent with home ex program MET  Pt will be able to wean from walker to independent MET  Pt will be able to increase range of motion 0-125   Pt will be able to increase quad lag from -25 to 0 degrees   Return to driving   Increase strength to 3+/ 5     Long Term Goals: 6 weeks   Pt will ambulate without a limp   Be able to stoop and bend pain free  Be able to return to yardwork.    Plan     Plan of care Certification: 4/13/2023 to 6/13/2023.     Outpatient Physical Therapy 2 times weekly for 8 weeks to include the following interventions: Electrical Stimulation nmes , Neuromuscular Re-ed, and Therapeutic Exercise.     Continue per Plan of Care and progress as pt able   Monitor for s/s of infection with ER visit as needed per Chase Martinez, PT   Chase Martinez,  PT  5/4/2023

## 2023-05-08 ENCOUNTER — CLINICAL SUPPORT (OUTPATIENT)
Dept: REHABILITATION | Facility: HOSPITAL | Age: 78
End: 2023-05-08
Payer: MEDICARE

## 2023-05-08 DIAGNOSIS — R29.898 DECREASED STRENGTH OF LOWER EXTREMITY: ICD-10-CM

## 2023-05-08 DIAGNOSIS — Z96.651 STATUS POST UNICOMPARTMENTAL KNEE REPLACEMENT, RIGHT: ICD-10-CM

## 2023-05-08 DIAGNOSIS — M25.661 DECREASED ROM OF RIGHT KNEE: Primary | ICD-10-CM

## 2023-05-08 PROCEDURE — 97112 NEUROMUSCULAR REEDUCATION: CPT | Mod: PN,CQ

## 2023-05-08 PROCEDURE — 97110 THERAPEUTIC EXERCISES: CPT | Mod: PN,CQ

## 2023-05-08 NOTE — PROGRESS NOTES
Physical Therapy Treatment Note     Name: Kailee Malone  Clinic Number: [id number]    Therapy Diagnosis:   Encounter Diagnoses   Name Primary?    Decreased ROM of right knee Yes    Status post unicompartmental knee replacement, right     Decreased strength of lower extremity      Physician: Dawood Jay MD    Visit Date: 5/8/2023    Physician Orders: PT Eval and Treat    Medical Diagnosis from Referral: right unicompartmental knee replacement   Evaluation Date: 4/13/2023  Updated Plan of Care Due : 6/10/2023  Authorization Period Expiration: humana   Plan of Care Expiration: humana approved 10 visits plus eval thru 6/13/2023  Visit # / Visits authorized: 8/ 11  PTA Visit #: 1    Time In: 1013  Time Out: 1058  Total Billable Time: 45 minutes     Precautions: Standard    Subjective     Pt reports: I still hurt a lot at night  She was compliant with home exercise program.  Response to previous treatment: sore  Functional change: improved walking    Pain: 5/10  Location: right knee      Objective     Kailee received therapeutic exercises to develop strength, endurance, ROM, flexibility, and posture for 30 minutes including:  Bike x 5 minutes   Slant board for bilateral calf stretch x 2 minutes   Double support squats total gym x 20  Double support calf raises total gym x 20  Double support single support leg presses 20 x 65#  Double support calf presses 15 x 65#  Swissball knee flexion x 20 repetitions   Right knee flexion stretch on step x 5  Right sitting hamstrings stretch x 5     Range of motion right knee:   Flexion   125 degrees   Extension  -5 degrees   Quad lag w/tke -9 degrees     Kailee participated in neuromuscular re-education activities to improve: Posture for 10 minutes. The following activities were included:  Short arc quads, terminal knee extension  each with electrical muscle stimulation x 5 minutes each  Sit to stand x 10 repetitions, no BUE support    Home Exercises Provided and Patient  Education Provided     Education provided: continue home exercise program, pain free; work on quad strengthening including step ups, sit to stand and straight leg raises;   Instructed in ice massage to incision prn and to monitor for s/s of infection with ER visit as needed    Written Home Exercises Provided: Patient instructed to cont prior HEP.  Exercises were reviewed and Kailee was able to demonstrate them prior to the end of the session.  Kailee demonstrated good  understanding of the education provided.     Assessment   Patient noted to still have redness along medial compartment, but less along length of incision.Physical therapist talked to nurse and sent updated photo.  Kailee Is progressing well towards her goals.   Pt prognosis is Excellent.     Pt will continue to benefit from skilled outpatient physical therapy to address the deficits listed in the problem list box on initial evaluation, provide pt/family education and to maximize pt's level of independence in the home and community environment.     Anticipated barriers to physical therapy: compliance with home exercise program     Goals:  Short Term Goals: 4 weeks   Pt will be independent with home ex program MET  Pt will be able to wean from walker to independent MET  Pt will be able to increase range of motion 0-125   Pt will be able to increase quad lag from -25 to 0 degrees   Return to driving   Increase strength to 3+/ 5     Long Term Goals: 6 weeks   Pt will ambulate without a limp   Be able to stoop and bend pain free  Be able to return to yardwork.    Plan     Plan of care Certification: 4/13/2023 to 6/13/2023.     Outpatient Physical Therapy 2 times weekly for 8 weeks to include the following interventions: Electrical Stimulation nmes , Neuromuscular Re-ed, and Therapeutic Exercise.     Continue per Plan of Care and progress as pt able   Monitor for s/s of infection with ER visit as needed per Chase Martinez, PT   Lucero Peña,  PTA  5/8/2023

## 2023-05-10 ENCOUNTER — CLINICAL SUPPORT (OUTPATIENT)
Dept: REHABILITATION | Facility: HOSPITAL | Age: 78
End: 2023-05-10
Payer: MEDICARE

## 2023-05-10 DIAGNOSIS — Z96.651 STATUS POST UNICOMPARTMENTAL KNEE REPLACEMENT, RIGHT: ICD-10-CM

## 2023-05-10 DIAGNOSIS — M25.661 DECREASED ROM OF RIGHT KNEE: Primary | ICD-10-CM

## 2023-05-10 DIAGNOSIS — R29.898 DECREASED STRENGTH OF LOWER EXTREMITY: ICD-10-CM

## 2023-05-10 PROCEDURE — 97110 THERAPEUTIC EXERCISES: CPT | Mod: PN

## 2023-05-10 PROCEDURE — 97112 NEUROMUSCULAR REEDUCATION: CPT | Mod: PN

## 2023-05-10 NOTE — PROGRESS NOTES
Physical Therapy Treatment Note     Name: Kailee Malone  Clinic Number: [id number]    Therapy Diagnosis:   Encounter Diagnoses   Name Primary?    Decreased ROM of right knee Yes    Status post unicompartmental knee replacement, right     Decreased strength of lower extremity      Physician: Dawood Jay MD    Visit Date: 5/10/2023    Physician Orders: PT Eval and Treat    Medical Diagnosis from Referral: right unicompartmental knee replacement   Evaluation Date: 4/13/2023  Updated Plan of Care Due : 6/10/2023  Authorization Period Expiration: humana   Plan of Care Expiration: humana approved 10 visits plus eval thru 6/13/2023  Visit # / Visits authorized: 9/ 11  PTA Visit #:     Time In: 1014  Time Out: 1059  Total Billable Time: 45 minutes     Precautions: Standard    Subjective     Pt reports: pain is not as bad feeling much better   She was compliant with home exercise program.  Response to previous treatment: sore  Functional change: improved walking    Pain: 0/10  Location: right knee      Objective     Kailee received therapeutic exercises to develop strength, endurance, ROM, flexibility, and posture for 30 minutes including:  Bike x 5 minutes   Slant board for bilateral calf stretch x 2 minutes   Double support squats total gym x 20  Double support calf raises total gym x 20  Double support single support leg presses 20 x 65#  Double support calf presses 15 x 65#  Swissball knee flexion x 20 repetitions   Right knee flexion stretch on step x 5  Right sitting hamstrings stretch x 5     Range of motion right knee:   Flexion   125 degrees   Extension  0 degrees   Quad lag w/tke -6 degrees     Kailee participated in neuromuscular re-education activities to improve: Posture for 10 minutes. The following activities were included:  Short arc quads, terminal knee extension  each with electrical muscle stimulation x 5 minutes each  Sit to stand x 10 repetitions, no BUE support    Home Exercises Provided and  Patient Education Provided     Education provided: continue home exercise program, pain free; work on quad strengthening including step ups, sit to stand and straight leg raises;   Instructed in ice massage to incision prn and to monitor for s/s of infection with ER visit as needed    Written Home Exercises Provided: Patient instructed to cont prior HEP.  Exercises were reviewed and Kailee was able to demonstrate them prior to the end of the session.  Kailee demonstrated good  understanding of the education provided.     Assessment   Pt without c/0    voices pain free  Kailee Is progressing well towards her goals.   Pt prognosis is Excellent.     Pt will continue to benefit from skilled outpatient physical therapy to address the deficits listed in the problem list box on initial evaluation, provide pt/family education and to maximize pt's level of independence in the home and community environment.     Anticipated barriers to physical therapy: compliance with home exercise program     Goals:  Short Term Goals: 4 weeks   Pt will be independent with home ex program MET  Pt will be able to wean from walker to independent MET  Pt will be able to increase range of motion 0-125   Pt will be able to increase quad lag from -25 to 0 degrees   Return to driving   Increase strength to 3+/ 5     Long Term Goals: 6 weeks   Pt will ambulate without a limp   Be able to stoop and bend pain free  Be able to return to yardwork.    Plan     Plan of care Certification: 4/13/2023 to 6/13/2023.     Outpatient Physical Therapy 2 times weekly for 8 weeks to include the following interventions: Electrical Stimulation nmes , Neuromuscular Re-ed, and Therapeutic Exercise.     Plan of care has been reestablished with Carol Peña LPTA, Esther Guajardo LPTA, Cherelle Austin LPTA  and Briana Mahmood LPTA.     Chase Martinez, PT  5/10/2023

## 2023-05-15 ENCOUNTER — CLINICAL SUPPORT (OUTPATIENT)
Dept: REHABILITATION | Facility: HOSPITAL | Age: 78
End: 2023-05-15
Payer: MEDICARE

## 2023-05-15 DIAGNOSIS — M25.661 DECREASED ROM OF RIGHT KNEE: Primary | ICD-10-CM

## 2023-05-15 DIAGNOSIS — R29.898 DECREASED STRENGTH OF LOWER EXTREMITY: ICD-10-CM

## 2023-05-15 DIAGNOSIS — Z96.651 STATUS POST UNICOMPARTMENTAL KNEE REPLACEMENT, RIGHT: ICD-10-CM

## 2023-05-15 DIAGNOSIS — R19.7 DIARRHEA, UNSPECIFIED TYPE: ICD-10-CM

## 2023-05-15 PROCEDURE — 97110 THERAPEUTIC EXERCISES: CPT | Mod: PN,CQ

## 2023-05-15 PROCEDURE — 97112 NEUROMUSCULAR REEDUCATION: CPT | Mod: PN,CQ

## 2023-05-15 RX ORDER — MONTELUKAST SODIUM 4 MG/1
1 TABLET, CHEWABLE ORAL 2 TIMES DAILY
Qty: 60 TABLET | Refills: 0 | Status: SHIPPED | OUTPATIENT
Start: 2023-05-15 | End: 2023-05-22 | Stop reason: SDUPTHER

## 2023-05-15 NOTE — PROGRESS NOTES
Physical Therapy Treatment Note     Name: Kailee Malone  Clinic Number: [id number]    Therapy Diagnosis:   Encounter Diagnoses   Name Primary?    Decreased ROM of right knee Yes    Status post unicompartmental knee replacement, right     Decreased strength of lower extremity      Physician: Dawood Jay MD    Visit Date: 5/15/2023    Physician Orders: PT Eval and Treat    Medical Diagnosis from Referral: right unicompartmental knee replacement   Evaluation Date: 4/13/2023  Updated Plan of Care Due : 6/10/2023  Authorization Period Expiration: humana   Plan of Care Expiration: humana approved 10 visits plus eval thru 6/13/2023  Visit # / Visits authorized: 10/ 11  PTA Visit #: 1    Time In: 1014  Time Out: 1100  Total Billable Time: 45 minutes     Precautions: Standard    Subjective     Pt reports: I turned sharp Friday and twisted my knee, its been hurting ever since but only when I walk. I can do my exs in the bed without pain. I'm having veritgo as well so I'm using walker at home.  She was compliant with home exercise program.  Response to previous treatment: sore  Functional change:   Pain: 0/10  Location: right knee      Objective     Kailee received therapeutic exercises to develop strength, endurance, ROM, flexibility, and posture for 30 minutes including:  Bike x 5 minutes   Slant board for bilateral calf stretch x 2 minutes   Double support squats total gym x 20  Double support calf raises total gym x 20  Double support single support leg presses 20 x 60#  Double support calf presses 15 x 60#  Swissball knee flexion x 20 repetitions   Right knee flexion stretch on step x 5  Right sitting hamstrings stretch x 5     Range of motion right knee:   Flexion   125 degrees   Extension  -3 degrees   Quad lag w/tke -7 degrees     Kailee participated in neuromuscular re-education activities to improve: Posture for 10 minutes. The following activities were included:  Short arc quads, terminal knee extension   each with electrical muscle stimulation x 5 minutes each      Home Exercises Provided and Patient Education Provided     Education provided: continue home exercise program, pain free; work on quad strengthening including step ups, sit to stand and straight leg raises;   Instructed in ice massage to incision prn and to monitor for s/s of infection with ER visit as needed    Written Home Exercises Provided: Patient instructed to cont prior HEP.  Exercises were reviewed and Kailee was able to demonstrate them prior to the end of the session.  Kailee demonstrated good  understanding of the education provided.     Assessment   Pt had no redness noted but tender to touch along lateral knee due to twisting. Patient had decreased knee extension range of motion, but voicing lack of activity yesterday.  Kailee Is progressing well towards her goals.   Pt prognosis is Excellent.     Pt will continue to benefit from skilled outpatient physical therapy to address the deficits listed in the problem list box on initial evaluation, provide pt/family education and to maximize pt's level of independence in the home and community environment.     Anticipated barriers to physical therapy: compliance with home exercise program     Goals:  Short Term Goals: 4 weeks   Pt will be independent with home ex program MET  Pt will be able to wean from walker to independent MET  Pt will be able to increase range of motion 0-125   Pt will be able to increase quad lag from -25 to 0 degrees   Return to driving   Increase strength to 3+/ 5     Long Term Goals: 6 weeks   Pt will ambulate without a limp   Be able to stoop and bend pain free  Be able to return to yardwork.    Plan     Plan of care Certification: 4/13/2023 to 6/13/2023.     Outpatient Physical Therapy 2 times weekly for 8 weeks to include the following interventions: Electrical Stimulation nmes , Neuromuscular Re-ed, and Therapeutic Exercise.     Plan of care has been reestablished  with Carol Peña LPTA, Esther Guajardo LPTA, Cherelle Austin LPTA  and Briana Mahmood LPTA.     Lucero Peña, PTA  5/15/2023

## 2023-05-17 ENCOUNTER — TELEPHONE (OUTPATIENT)
Dept: GASTROENTEROLOGY | Facility: CLINIC | Age: 78
End: 2023-05-17
Payer: MEDICARE

## 2023-05-17 ENCOUNTER — OFFICE VISIT (OUTPATIENT)
Dept: NEUROLOGY | Facility: CLINIC | Age: 78
End: 2023-05-17
Payer: MEDICARE

## 2023-05-17 VITALS
SYSTOLIC BLOOD PRESSURE: 110 MMHG | HEIGHT: 65 IN | DIASTOLIC BLOOD PRESSURE: 70 MMHG | HEART RATE: 58 BPM | OXYGEN SATURATION: 95 % | WEIGHT: 179 LBS | BODY MASS INDEX: 29.82 KG/M2

## 2023-05-17 DIAGNOSIS — R25.1 TREMORS OF NERVOUS SYSTEM: Primary | ICD-10-CM

## 2023-05-17 PROCEDURE — 3288F FALL RISK ASSESSMENT DOCD: CPT | Mod: CPTII,,, | Performed by: NURSE PRACTITIONER

## 2023-05-17 PROCEDURE — 99215 OFFICE O/P EST HI 40 MIN: CPT | Mod: PBBFAC | Performed by: NURSE PRACTITIONER

## 2023-05-17 PROCEDURE — 1159F PR MEDICATION LIST DOCUMENTED IN MEDICAL RECORD: ICD-10-PCS | Mod: CPTII,,, | Performed by: NURSE PRACTITIONER

## 2023-05-17 PROCEDURE — 1159F MED LIST DOCD IN RCRD: CPT | Mod: CPTII,,, | Performed by: NURSE PRACTITIONER

## 2023-05-17 PROCEDURE — 1160F RVW MEDS BY RX/DR IN RCRD: CPT | Mod: CPTII,,, | Performed by: NURSE PRACTITIONER

## 2023-05-17 PROCEDURE — 3074F SYST BP LT 130 MM HG: CPT | Mod: CPTII,,, | Performed by: NURSE PRACTITIONER

## 2023-05-17 PROCEDURE — 1125F AMNT PAIN NOTED PAIN PRSNT: CPT | Mod: CPTII,,, | Performed by: NURSE PRACTITIONER

## 2023-05-17 PROCEDURE — 99212 OFFICE O/P EST SF 10 MIN: CPT | Mod: S$PBB,,, | Performed by: NURSE PRACTITIONER

## 2023-05-17 PROCEDURE — 1125F PR PAIN SEVERITY QUANTIFIED, PAIN PRESENT: ICD-10-PCS | Mod: CPTII,,, | Performed by: NURSE PRACTITIONER

## 2023-05-17 PROCEDURE — 99212 PR OFFICE/OUTPT VISIT, EST, LEVL II, 10-19 MIN: ICD-10-PCS | Mod: S$PBB,,, | Performed by: NURSE PRACTITIONER

## 2023-05-17 PROCEDURE — 1160F PR REVIEW ALL MEDS BY PRESCRIBER/CLIN PHARMACIST DOCUMENTED: ICD-10-PCS | Mod: CPTII,,, | Performed by: NURSE PRACTITIONER

## 2023-05-17 PROCEDURE — 1101F PR PT FALLS ASSESS DOC 0-1 FALLS W/OUT INJ PAST YR: ICD-10-PCS | Mod: CPTII,,, | Performed by: NURSE PRACTITIONER

## 2023-05-17 PROCEDURE — 3078F DIAST BP <80 MM HG: CPT | Mod: CPTII,,, | Performed by: NURSE PRACTITIONER

## 2023-05-17 PROCEDURE — 3074F PR MOST RECENT SYSTOLIC BLOOD PRESSURE < 130 MM HG: ICD-10-PCS | Mod: CPTII,,, | Performed by: NURSE PRACTITIONER

## 2023-05-17 PROCEDURE — 3288F PR FALLS RISK ASSESSMENT DOCUMENTED: ICD-10-PCS | Mod: CPTII,,, | Performed by: NURSE PRACTITIONER

## 2023-05-17 PROCEDURE — 3078F PR MOST RECENT DIASTOLIC BLOOD PRESSURE < 80 MM HG: ICD-10-PCS | Mod: CPTII,,, | Performed by: NURSE PRACTITIONER

## 2023-05-17 PROCEDURE — 1101F PT FALLS ASSESS-DOCD LE1/YR: CPT | Mod: CPTII,,, | Performed by: NURSE PRACTITIONER

## 2023-05-17 RX ORDER — PREDNISONE 5 MG/1
5 TABLET ORAL
COMMUNITY
Start: 2023-03-24

## 2023-05-17 RX ORDER — ESCITALOPRAM OXALATE 20 MG/1
20 TABLET ORAL
COMMUNITY
Start: 2023-04-25

## 2023-05-17 NOTE — PROGRESS NOTES
Subjective:       Patient ID: Kailee Malone is a 78 y.o. female     Chief Complaint:    Chief Complaint   Patient presents with    Follow-up        Allergies:  Betadine prepstick [povidone-iodine-ethyl alcohol] and Penicillins    Current Medications:    Outpatient Encounter Medications as of 5/17/2023   Medication Sig Dispense Refill    alendronate (FOSAMAX) 70 MG tablet Take 70 mg by mouth every 7 days.      busPIRone (BUSPAR) 5 MG Tab Take 5 mg by mouth 2 (two) times daily.      colestipoL (COLESTID) 1 gram Tab Take 1 tablet (1 g total) by mouth 2 (two) times daily. 60 tablet 0    ergocalciferol (ERGOCALCIFEROL) 50,000 unit Cap Take one capsule weekly for 12 weeks then reduce to one capsule monthly 12 capsule 1    EScitalopram oxalate (LEXAPRO) 20 MG tablet Take 20 mg by mouth.      FLUoxetine 20 MG capsule Take 20 mg by mouth once daily.      ketoconazole (NIZORAL) 2 % cream APPLY TO AFFECTED AREAS TWICE DAILY UNTIL CLEAR      ketoconazole (NIZORAL) 2 % shampoo APPLY ON BACK LEAVE ON FOR 5 MINUTES THEN RINSE, 2 TO 3 TIMES WEEKLY      lansoprazole (PREVACID) 30 MG capsule Take 1 capsule (30 mg total) by mouth once daily. 90 capsule 3    lipase-protease-amylase (CREON) 36,000-114,000- 180,000 unit CpDR TAKE 2 CAPSULES BY MOUTH DURING EACH MEAL AND 1 CAPSULE DURING EACH SNACK 240 capsule 0    predniSONE (DELTASONE) 5 MG tablet Take 5 mg by mouth.      primidone (MYSOLINE) 50 MG Tab Take one tablet at bedtime 90 tablet 3    propranoloL (INDERAL) 60 MG tablet Take one tablet daily, monitor heart rate and blood pressure before taking 90 tablet 3    triamterene-hydrochlorothiazide 37.5-25 mg (MAXZIDE-25) 37.5-25 mg per tablet Take 1 tablet by mouth once daily.      estradioL (ESTRACE) 0.01 % (0.1 mg/gram) vaginal cream Place 1 g vaginally once daily. (Patient not taking: Reported on 1/18/2023) 42.5 g 6    [DISCONTINUED] colestipoL (COLESTID) 1 gram Tab Take 1 tablet (1 g total) by mouth 2 (two) times daily. 60  tablet 0     No facility-administered encounter medications on file as of 5/17/2023.       History of Present Illness  79 y/o female following in neurology for tremors.    Tremor onset around 2003.  Has had worsening tremor in past due to anxiety and depression symptoms as well.  On primidone 25mg at night and propranolol 60mg daily.  In past higher dosing of propranolol caused bradycardia.  She reports today her tremor is actually much better.  She says her primary care took her off prozac about 6 weeks ago, changing to lexapro, and this has possibly helped.  I suggest, given the improvement, we try trial of stopping her mysoline and she is in agreement.  I do not note tremor on exam in clinic today    MRI of the brain with and without contrast done on April 12, 2022 showed no acute abnormality.             Review of Systems  Review of Systems   Constitutional:  Negative for diaphoresis and fever.   HENT:  Negative for congestion, hearing loss and tinnitus.    Eyes:  Negative for blurred vision, double vision, photophobia, discharge and redness.   Respiratory:  Negative for cough and shortness of breath.    Cardiovascular:  Negative for chest pain.   Gastrointestinal:  Negative for abdominal pain, nausea and vomiting.   Musculoskeletal:  Negative for back pain, joint pain, myalgias and neck pain.   Skin:  Negative for itching and rash.   Neurological:  Positive for tremors. Negative for dizziness, sensory change, speech change, focal weakness, seizures, loss of consciousness, weakness and headaches.   Psychiatric/Behavioral:  Negative for depression, hallucinations and memory loss. The patient does not have insomnia.    All other systems reviewed and are negative.   Objective:     NEUROLOGICAL EXAMINATION:     MENTAL STATUS   Oriented to person, place, and time.   Registration: recalls 3 of 3 objects. Recall at 5 minutes: recalls 3 of 3 objects.   Attention: normal. Concentration: normal.   Speech: speech is normal    Level of consciousness: alert  Knowledge: good and consistent with education.   Normal comprehension.     CRANIAL NERVES     CN II   Visual fields full to confrontation.   Visual acuity: normal  Right visual field deficit: none  Left visual field deficit: none     CN III, IV, VI   Pupils are equal, round, and reactive to light.  Extraocular motions are normal.   Right pupil: Size: 3 mm. Shape: regular. Reactivity: brisk. Consensual response: intact. Accommodation: intact.   Left pupil: Size: 3 mm. Shape: regular. Reactivity: brisk. Consensual response: intact. Accommodation: intact.   CN III: no CN III palsy  CN VI: no CN VI palsy  Nystagmus: none   Diplopia: none  Upgaze: normal  Downgaze: normal  Conjugate gaze: present  Vestibulo-ocular reflex: present    CN V   Facial sensation intact.   Right facial sensation deficit: none  Left facial sensation deficit: none  Right corneal reflex: normal  Left corneal reflex: normal    CN VII   Facial expression full, symmetric.   Right facial weakness: none  Left facial weakness: none  Right taste: normal  Left taste: normal    CN VIII   CN VIII normal.   Hearing: intact    CN IX, X   CN IX normal.   CN X normal.   Palate: symmetric    CN XI   CN XI normal.   Right sternocleidomastoid strength: normal  Left sternocleidomastoid strength: normal  Right trapezius strength: normal  Left trapezius strength: normal    CN XII   CN XII normal.   Tongue: not atrophic  Fasciculations: absent  Tongue deviation: none    MOTOR EXAM   Muscle bulk: normal  Overall muscle tone: normal  Right arm tone: normal  Left arm tone: normal  Right arm pronator drift: absent  Left arm pronator drift: absent  Right leg tone: normal  Left leg tone: normal    Strength   Right neck flexion: 5/5  Left neck flexion: 5/5  Right neck extension: 5/5  Left neck extension: 5/5  Right deltoid: 5/5  Left deltoid: 5/5  Right biceps: 5/5  Left biceps: 5/5  Right triceps: 5/5  Left triceps: 5/5  Right wrist flexion:  5/5  Left wrist flexion: 5/5  Right wrist extension: 5/5  Left wrist extension: 5/5  Right interossei: 5/5  Left interossei: 5/5  Right iliopsoas: 5/5  Left iliopsoas: 5/5  Right quadriceps: 5/5  Left quadriceps: 5/5  Right hamstrin/5  Left hamstrin/5  Right anterior tibial: 5/5  Left anterior tibial: 5/5  Right posterior tibial: 5/5  Left posterior tibial: 5/5  Right gastroc: 5/5  Left gastroc: 5/5    REFLEXES     Reflexes   Right brachioradialis: 2+  Left brachioradialis: 2+  Right biceps: 2+  Left biceps: 2+  Right triceps: 2+  Left triceps: 2+  Right patellar: 2+  Left patellar: 2+  Right achilles: 2+  Left achilles: 2+  Right plantar: normal  Left plantar: normal  Right Leung: absent  Left Leung: absent  Right ankle clonus: absent  Left ankle clonus: absent  Right pendular knee jerk: absent  Left pendular knee jerk: absent    SENSORY EXAM   Light touch normal.   Right arm light touch: normal  Left arm light touch: normal  Right leg light touch: normal  Left leg light touch: normal  Vibration normal.   Right arm vibration: normal  Left arm vibration: normal  Right leg vibration: normal  Left leg vibration: normal  Proprioception normal.   Right arm proprioception: normal  Left arm proprioception: normal  Right leg proprioception: normal  Left leg proprioception: normal  Pinprick normal.   Right arm pinprick: normal  Left arm pinprick: normal  Right leg pinprick: normal  Left leg pinprick: normal  Graphesthesia: normal  Romberg: negative  Stereognosis: normal    GAIT AND COORDINATION     Gait  Gait: normal     Coordination   Finger to nose coordination: normal  Heel to shin coordination: normal  Tandem walking coordination: normal    Tremor   Resting tremor: absent  Intention tremor: absent  Action tremor: absent     Physical Exam  Vitals and nursing note reviewed.   Constitutional:       Appearance: Normal appearance.   HENT:      Head: Normocephalic.   Eyes:      Extraocular Movements: Extraocular  movements intact and EOM normal.      Pupils: Pupils are equal, round, and reactive to light.   Cardiovascular:      Rate and Rhythm: Normal rate and regular rhythm.   Pulmonary:      Effort: Pulmonary effort is normal.      Breath sounds: Normal breath sounds.   Musculoskeletal:         General: No swelling or tenderness. Normal range of motion.      Cervical back: Normal range of motion and neck supple.      Right lower leg: No edema.      Left lower leg: No edema.   Skin:     General: Skin is warm and dry.      Coloration: Skin is not jaundiced.      Findings: No rash.   Neurological:      General: No focal deficit present.      Mental Status: She is alert and oriented to person, place, and time.      GCS: GCS eye subscore is 4. GCS verbal subscore is 5. GCS motor subscore is 6.      Cranial Nerves: No cranial nerve deficit.      Sensory: No sensory deficit.      Motor: Motor function is intact. No weakness.      Coordination: Coordination is intact. Coordination normal. Finger-Nose-Finger Test, Heel to Shin Test and Romberg Test normal.      Gait: Gait is intact. Gait and tandem walk normal.      Deep Tendon Reflexes: Reflexes normal.      Reflex Scores:       Tricep reflexes are 2+ on the right side and 2+ on the left side.       Bicep reflexes are 2+ on the right side and 2+ on the left side.       Brachioradialis reflexes are 2+ on the right side and 2+ on the left side.       Patellar reflexes are 2+ on the right side and 2+ on the left side.       Achilles reflexes are 2+ on the right side and 2+ on the left side.  Psychiatric:         Mood and Affect: Mood normal.         Speech: Speech normal.         Behavior: Behavior normal.        Assessment:     Problem List Items Addressed This Visit          Neuro    Tremors of nervous system - Primary        Primary Diagnosis and ICD10  Tremors of nervous system [R25.1]    Plan:     Patient Instructions   Continue the propranolol 60mg daily  Hold the mysoline 25mg  daily  Followup in clinic in 2 months    There are no discontinued medications.    Requested Prescriptions      No prescriptions requested or ordered in this encounter       No orders of the defined types were placed in this encounter.

## 2023-05-18 ENCOUNTER — CLINICAL SUPPORT (OUTPATIENT)
Dept: REHABILITATION | Facility: HOSPITAL | Age: 78
End: 2023-05-18
Payer: MEDICARE

## 2023-05-18 DIAGNOSIS — Z96.651 STATUS POST UNICOMPARTMENTAL KNEE REPLACEMENT, RIGHT: ICD-10-CM

## 2023-05-18 DIAGNOSIS — M25.661 DECREASED ROM OF RIGHT KNEE: Primary | ICD-10-CM

## 2023-05-18 DIAGNOSIS — R29.898 DECREASED STRENGTH OF LOWER EXTREMITY: ICD-10-CM

## 2023-05-18 PROCEDURE — 97112 NEUROMUSCULAR REEDUCATION: CPT | Mod: PN,CQ

## 2023-05-18 PROCEDURE — 97110 THERAPEUTIC EXERCISES: CPT | Mod: PN,CQ

## 2023-05-18 NOTE — PLAN OF CARE
Physical Therapy Treatment Note      Name: Kailee Malone  Clinic Number: [id number]     Therapy Diagnosis:        Encounter Diagnoses   Name Primary?    Decreased ROM of right knee Yes    Status post unicompartmental knee replacement, right      Decreased strength of lower extremity        Physician: Dawood Jay MD     Visit Date: 5/18/2023     Physician Orders: PT Eval and Treat    Medical Diagnosis from Referral: right unicompartmental knee replacement   Evaluation Date: 4/13/2023  Updated Plan of Care Due : 6/10/2023  Authorization Period Expiration: human[employer]   Plan of Care Expiration: human[employer] approved 10 visits plus eval thru 6/13/2023  Visit # / Visits authorized: 11/ 11  PTA Visit #: 2     Time In: 1230  Time Out: 115  Total Billable Time: 45 minutes      Precautions: Standard     Subjective      Pt reports:I saw Pierre's FNP yesterday and got a shot and steroids, she said I just twisted it. I will see him next week to recheck.  She was compliant with home exercise program.  Response to previous treatment: sore  Functional change: none noted     Pain: 7/10  Location: right knee       Objective      Kailee received therapeutic exercises to develop strength, endurance, ROM, flexibility, and posture for 30 minutes including:  Bike x 5 minutes   Slant board for bilateral calf stretch x 2 minutes   Double support squats total gym x 20  Double support calf raises total gym x 20  Double support single support leg presses 20 x 60#  Double support calf presses 15 x 60#  Swissball knee flexion x 20 repetitions   Right knee flexion stretch on step x 5  Right sitting hamstrings stretch x 5      Range of motion right knee:   Flexion            122 degrees   Extension        -3 degrees   Quad lag w/tke -8 degrees      Kailee participated in neuromuscular re-education activities to improve: Posture for 10 minutes. The following activities were included:  Short arc quads, terminal knee extension  each with electrical  muscle stimulation x 5 minutes each     Home Exercises Provided and Patient Education Provided      Education provided: continue home exercise program, pain free; work on quad strengthening including step ups, sit to stand and straight leg raises;   Instructed in ice massage to incision prn and to monitor for s/s of infection with ER visit as needed     Written Home Exercises Provided: Patient instructed to cont prior HEP.  Exercises were reviewed and Kailee was able to demonstrate them prior to the end of the session.  Kailee demonstrated good  understanding of the education provided.      Assessment   Pt noted to have OLU hose donned this treatment, has c/o medial knee pain with ambulation.  Kailee Is progressing well towards her goals.   Pt prognosis is Excellent.      Pt will continue to benefit from skilled outpatient physical therapy to address the deficits listed in the problem list box on initial evaluation, provide pt/family education and to maximize pt's level of independence in the home and community environment.      Anticipated barriers to physical therapy: compliance with home exercise program      Goals:  Short Term Goals: 4 weeks   Pt will be independent with home ex program MET  Pt will be able to wean from walker to independent MET  Pt will be able to increase range of motion 0-125   Pt will be able to increase quad lag from -25 to 0 degrees   Return to driving   Increase strength to 3+/ 5     Long Term Goals: 6 weeks   Pt will ambulate without a limp   Be able to stoop and bend pain free  Be able to return to yardwork.     Plan   Reasons for Recertification of Therapy: cont PT     Plan     Updated Certification Period: 5/18/2023 to 7/16/2023  Recommended Treatment Plan: 2 times per week for 8 weeks: Electrical Stimulation nmes, Neuromuscular Re-ed, Patient Education, Therapeutic Exercise, Ultrasound, and modalities as needed.   Other Recommendations:  cont PT     Chase Martinez  PT  5/18/2023      I CERTIFY THE NEED FOR THESE SERVICES FURNISHED UNDER THIS PLAN OF TREATMENT AND WHILE UNDER MY CARE.    Physician's comments:      Physician's Signature: ___________________________________________________

## 2023-05-18 NOTE — PROGRESS NOTES
Physical Therapy Treatment Note     Name: Kailee Malone  Clinic Number: [id number]    Therapy Diagnosis:   Encounter Diagnoses   Name Primary?    Decreased ROM of right knee Yes    Status post unicompartmental knee replacement, right     Decreased strength of lower extremity      Physician: Dawood Jay MD    Visit Date: 5/18/2023    Physician Orders: PT Eval and Treat    Medical Diagnosis from Referral: right unicompartmental knee replacement   Evaluation Date: 4/13/2023  Updated Plan of Care Due : 6/10/2023  Authorization Period Expiration: human[employer] 7/22/2023  Plan of Care Expiration: [employer] approved 10 visits plus eval thru 6/13/2023  Visit # / Visits authorized: 11/ 20  PTA Visit #: 2    Time In: 1230  Time Out: 115  Total Billable Time: 45 minutes     Precautions: Standard    Subjective     Pt reports:I saw Pierre's FNP yesterday and got a shot and steroids, she said I just twisted it. I will see him next week to recheck.  She was compliant with home exercise program.  Response to previous treatment: sore  Functional change: none noted    Pain: 7/10  Location: right knee      Objective     Kailee received therapeutic exercises to develop strength, endurance, ROM, flexibility, and posture for 30 minutes including:  Bike x 5 minutes   Slant board for bilateral calf stretch x 2 minutes   Double support squats total gym x 20  Double support calf raises total gym x 20  Double support single support leg presses 20 x 60#  Double support calf presses 15 x 60#  Swissball knee flexion x 20 repetitions   Right knee flexion stretch on step x 5  Right sitting hamstrings stretch x 5     Range of motion right knee:   Flexion            122 degrees   Extension  -3 degrees   Quad lag w/tke -8 degrees     Kailee participated in neuromuscular re-education activities to improve: Posture for 10 minutes. The following activities were included:  Short arc quads, terminal knee extension  each with electrical muscle stimulation  x 5 minutes each    Home Exercises Provided and Patient Education Provided     Education provided: continue home exercise program, pain free; work on quad strengthening including step ups, sit to stand and straight leg raises;   Instructed in ice massage to incision prn and to monitor for s/s of infection with ER visit as needed    Written Home Exercises Provided: Patient instructed to cont prior HEP.  Exercises were reviewed and Kailee was able to demonstrate them prior to the end of the session.  Kailee demonstrated good  understanding of the education provided.     Assessment   Pt noted to have OLU hose donned this treatment, has c/o medial knee pain with ambulation.  Kailee Is progressing well towards her goals.   Pt prognosis is Excellent.     Pt will continue to benefit from skilled outpatient physical therapy to address the deficits listed in the problem list box on initial evaluation, provide pt/family education and to maximize pt's level of independence in the home and community environment.     Anticipated barriers to physical therapy: compliance with home exercise program     Goals:  Short Term Goals: 4 weeks   Pt will be independent with home ex program MET  Pt will be able to wean from walker to independent MET  Pt will be able to increase range of motion 0-125   Pt will be able to increase quad lag from -25 to 0 degrees   Return to driving   Increase strength to 3+/ 5     Long Term Goals: 6 weeks   Pt will ambulate without a limp   Be able to stoop and bend pain free  Be able to return to yardwork.    Plan     Plan of care Certification: 4/13/2023 to 6/13/2023.     Outpatient Physical Therapy 2 times weekly for 8 weeks to include the following interventions: Electrical Stimulation nmes , Neuromuscular Re-ed, and Therapeutic Exercise.     Plan of care has been reestablished with Carol Peña LPTA, Esther Guajardo LPTA, Cherelle Austin LPTA  and Briana Mahmood LPTA.     Lucero Peña, PTA  5/18/2023

## 2023-05-22 ENCOUNTER — OFFICE VISIT (OUTPATIENT)
Dept: GASTROENTEROLOGY | Facility: CLINIC | Age: 78
End: 2023-05-22
Payer: MEDICARE

## 2023-05-22 VITALS
SYSTOLIC BLOOD PRESSURE: 129 MMHG | HEIGHT: 65 IN | WEIGHT: 180 LBS | DIASTOLIC BLOOD PRESSURE: 79 MMHG | BODY MASS INDEX: 29.99 KG/M2

## 2023-05-22 DIAGNOSIS — R19.7 DIARRHEA, UNSPECIFIED TYPE: Primary | ICD-10-CM

## 2023-05-22 DIAGNOSIS — K44.9 HH (HIATUS HERNIA): ICD-10-CM

## 2023-05-22 DIAGNOSIS — K76.0 FATTY LIVER: ICD-10-CM

## 2023-05-22 PROCEDURE — 1101F PR PT FALLS ASSESS DOC 0-1 FALLS W/OUT INJ PAST YR: ICD-10-PCS | Mod: CPTII,,, | Performed by: NURSE PRACTITIONER

## 2023-05-22 PROCEDURE — 99214 OFFICE O/P EST MOD 30 MIN: CPT | Mod: PBBFAC | Performed by: NURSE PRACTITIONER

## 2023-05-22 PROCEDURE — 3074F SYST BP LT 130 MM HG: CPT | Mod: CPTII,,, | Performed by: NURSE PRACTITIONER

## 2023-05-22 PROCEDURE — 99214 PR OFFICE/OUTPT VISIT, EST, LEVL IV, 30-39 MIN: ICD-10-PCS | Mod: S$PBB,,, | Performed by: NURSE PRACTITIONER

## 2023-05-22 PROCEDURE — 3078F PR MOST RECENT DIASTOLIC BLOOD PRESSURE < 80 MM HG: ICD-10-PCS | Mod: CPTII,,, | Performed by: NURSE PRACTITIONER

## 2023-05-22 PROCEDURE — 3074F PR MOST RECENT SYSTOLIC BLOOD PRESSURE < 130 MM HG: ICD-10-PCS | Mod: CPTII,,, | Performed by: NURSE PRACTITIONER

## 2023-05-22 PROCEDURE — 3078F DIAST BP <80 MM HG: CPT | Mod: CPTII,,, | Performed by: NURSE PRACTITIONER

## 2023-05-22 PROCEDURE — 3288F PR FALLS RISK ASSESSMENT DOCUMENTED: ICD-10-PCS | Mod: CPTII,,, | Performed by: NURSE PRACTITIONER

## 2023-05-22 PROCEDURE — 3288F FALL RISK ASSESSMENT DOCD: CPT | Mod: CPTII,,, | Performed by: NURSE PRACTITIONER

## 2023-05-22 PROCEDURE — 1159F MED LIST DOCD IN RCRD: CPT | Mod: CPTII,,, | Performed by: NURSE PRACTITIONER

## 2023-05-22 PROCEDURE — 1101F PT FALLS ASSESS-DOCD LE1/YR: CPT | Mod: CPTII,,, | Performed by: NURSE PRACTITIONER

## 2023-05-22 PROCEDURE — 1159F PR MEDICATION LIST DOCUMENTED IN MEDICAL RECORD: ICD-10-PCS | Mod: CPTII,,, | Performed by: NURSE PRACTITIONER

## 2023-05-22 PROCEDURE — 99214 OFFICE O/P EST MOD 30 MIN: CPT | Mod: S$PBB,,, | Performed by: NURSE PRACTITIONER

## 2023-05-22 RX ORDER — MONTELUKAST SODIUM 4 MG/1
1 TABLET, CHEWABLE ORAL 2 TIMES DAILY
Qty: 180 TABLET | Refills: 1 | Status: SHIPPED | OUTPATIENT
Start: 2023-05-22 | End: 2023-11-18

## 2023-05-22 NOTE — PROGRESS NOTES
Kailee Malone is a 78 y.o. female here for No chief complaint on file.        PCP: Caden Bradford  Referring Provider: No referring provider defined for this encounter.     HPI:  Presents for follow-up due to diarrhea.  Pancreatic elastase was borderline at 106.  Patient did attempt to take Creon as a trial for diarrhea.  Patient was unable to tolerate Creon and felt that it increased her diarrhea symptoms.  Also reported that the Creon was too expensive.  She was placed on Colestid 1 g p.o. twice daily.  States that she has had no further diarrhea on Colestid.  She did have a colonoscopy on 10/04/2021, report reviewed, no colon polyps noted.  Recommendation to repeat in 5 years. 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 EGD/Dilation 2/3/22. She is on a PPI. No dysphagia reported.  She does have a history of fatty liver.  She reported that she did have a ultrasound at [facility].  We still have not received these results.  We will request the results of the ultrasound.        ROS:  Review of Systems   Constitutional:  Negative for appetite change, fatigue, fever and unexpected weight change.   HENT:  Negative for trouble swallowing.    Cardiovascular:  Negative for chest pain.   Gastrointestinal:  Positive for diarrhea (improved) and reflux. Negative for abdominal pain, blood in stool, change in bowel habit, constipation, nausea, vomiting and change in bowel habit.   Musculoskeletal:  Negative for gait problem.   Integumentary:  Negative for pallor.   Psychiatric/Behavioral:  The patient is not nervous/anxious.         PMHX:  has a past medical history of Acute gastric ulcer without hemorrhage or perforation (02/03/2022), Acute superficial gastritis without hemorrhage (02/03/2022), Kirkpatrick's esophagus without dysplasia (02/04/2022), Diverticula, colon (10/04/2021), HH (hiatus hernia) (02/03/2022), History of colon polyps (10/04/2021), Hypertension, and Screening for colon cancer (10/04/2021).    PSHX:   has a past surgical history that includes RT WRIST; Hysterectomy; Breast surgery; Arthroscopy of knee (Right, 05/24/2021); Knee arthroscopy w/ meniscectomy (Right, 05/24/2021); Breast biopsy; and Oophorectomy.    PFHX: family history includes Breast cancer in her maternal aunt and mother.    PSlHX:  reports that she has never smoked. She has never used smokeless tobacco. She reports that she does not drink alcohol and does not use drugs.        Review of patient's allergies indicates:   Allergen Reactions    Betadine prepstick [povidone-iodine-ethyl alcohol]     Penicillins        Medication List with Changes/Refills   Current Medications    ALENDRONATE (FOSAMAX) 70 MG TABLET    Take 70 mg by mouth every 7 days.    BUSPIRONE (BUSPAR) 5 MG TAB    Take 5 mg by mouth 2 (two) times daily.    ERGOCALCIFEROL (ERGOCALCIFEROL) 50,000 UNIT CAP    Take one capsule weekly for 12 weeks then reduce to one capsule monthly    ESCITALOPRAM OXALATE (LEXAPRO) 20 MG TABLET    Take 20 mg by mouth.    ESTRADIOL (ESTRACE) 0.01 % (0.1 MG/GRAM) VAGINAL CREAM    Place 1 g vaginally once daily.    FLUOXETINE 20 MG CAPSULE    Take 20 mg by mouth once daily.    KETOCONAZOLE (NIZORAL) 2 % CREAM    APPLY TO AFFECTED AREAS TWICE DAILY UNTIL CLEAR    KETOCONAZOLE (NIZORAL) 2 % SHAMPOO    APPLY ON BACK LEAVE ON FOR 5 MINUTES THEN RINSE, 2 TO 3 TIMES WEEKLY    LANSOPRAZOLE (PREVACID) 30 MG CAPSULE    Take 1 capsule (30 mg total) by mouth once daily.    PREDNISONE (DELTASONE) 5 MG TABLET    Take 5 mg by mouth.    PRIMIDONE (MYSOLINE) 50 MG TAB    Take one tablet at bedtime    PROPRANOLOL (INDERAL) 60 MG TABLET    Take one tablet daily, monitor heart rate and blood pressure before taking    TRIAMTERENE-HYDROCHLOROTHIAZIDE 37.5-25 MG (MAXZIDE-25) 37.5-25 MG PER TABLET    Take 1 tablet by mouth once daily.   Changed and/or Refilled Medications    Modified Medication Previous Medication    COLESTIPOL (COLESTID) 1 GRAM TAB colestipoL (COLESTID) 1 gram  "Tab       Take 1 tablet (1 g total) by mouth 2 (two) times daily.    Take 1 tablet (1 g total) by mouth 2 (two) times daily.   Discontinued Medications    LIPASE-PROTEASE-AMYLASE (CREON) 36,000-114,000- 180,000 UNIT CPDR    TAKE 2 CAPSULES BY MOUTH DURING EACH MEAL AND 1 CAPSULE DURING EACH SNACK        Objective Findings:  Vital Signs:  /79   Ht 5' 5" (1.651 m)   Wt 81.6 kg (180 lb)   BMI 29.95 kg/m²  Body mass index is 29.95 kg/m².    Physical Exam:  Physical Exam  Vitals and nursing note reviewed.   Constitutional:       General: She is not in acute distress.     Appearance: Normal appearance.   HENT:      Mouth/Throat:      Mouth: Mucous membranes are moist.   Cardiovascular:      Rate and Rhythm: Bradycardia present.   Pulmonary:      Effort: Pulmonary effort is normal.      Breath sounds: No wheezing, rhonchi or rales.   Abdominal:      General: Bowel sounds are normal. There is no distension.      Palpations: Abdomen is soft. There is no mass.      Tenderness: There is no abdominal tenderness. There is no guarding.   Skin:     General: Skin is warm and dry.      Coloration: Skin is not jaundiced or pale.   Neurological:      Mental Status: She is alert and oriented to person, place, and time.   Psychiatric:         Mood and Affect: Mood normal.        Labs:  Lab Results   Component Value Date    WBC 9.20 01/18/2023    HGB 13.4 01/18/2023    HCT 40.8 01/18/2023    MCV 91.3 01/18/2023    RDW 13.1 01/18/2023     01/18/2023    LYMPH 36.5 01/18/2023    LYMPH 3.36 01/18/2023    MONO 9.6 (H) 01/18/2023    EOS 0.28 01/18/2023    BASO 0.07 01/18/2023     Lab Results   Component Value Date     01/18/2023    K 4.4 01/18/2023    CL 99 01/18/2023    CO2 30 01/18/2023    GLU 99 01/18/2023    BUN 22 (H) 01/18/2023    CREATININE 0.95 01/18/2023    CALCIUM 9.9 01/18/2023    PROT 8.0 01/18/2023    ALBUMIN 4.1 01/18/2023    BILITOT 0.4 01/18/2023    ALKPHOS 31 (L) 01/18/2023    AST 37 01/18/2023    ALT 52 " 01/18/2023         Imaging: US Lower Extremity Veins Right    Result Date: 5/3/2023  EXAMINATION: US LOWER EXTREMITY VEINS RIGHT CLINICAL HISTORY: Presence of unspecified artificial knee joint TECHNIQUE: Duplex and color flow Doppler and dynamic compression was performed of the veins was performed. COMPARISON: None. FINDINGS: No evidence of echogenic, non-compressible thrombus seen in the visualized veins of the extremities.  Color Doppler venous waveform pattern is within normal limits.     No evidence of deep venous thrombosis. Ultrasound images stored and captured. Electronically signed by: Mahesh Rick Date:    05/03/2023 Time:    14:29        Assessment:  Kailee Malone is a 78 y.o. female here with:  1. Diarrhea, unspecified type    2. Fatty liver    3. HH (hiatus hernia)          Recommendations:  1. Continue Colestid 1 g p.o. twice daily  2. Continue Prevacid  3. Avoid spicy, greasy foods  Avoid caffeine, citric acid, chocolate, peppermint, and carbonated drinks  Do not lay down within 3 hours of eating  Exercise 150 minutes per week  Increase fluid to 64 ounces daily  Avoid antiinflammatory medications such as motrin, advil, aleve, ibuprofen, and BC powder  4. Request ultrasound from [facility] due to fatty liver    Follow up in about 6 months (around 11/22/2023).      Order summary:  Orders Placed This Encounter    colestipoL (COLESTID) 1 gram Tab       Thank you for allowing me to participate in the care of Kailee Malone.      ABDIRAHMAN Mercado

## 2023-05-26 ENCOUNTER — CLINICAL SUPPORT (OUTPATIENT)
Dept: REHABILITATION | Facility: HOSPITAL | Age: 78
End: 2023-05-26
Payer: MEDICARE

## 2023-05-26 DIAGNOSIS — M25.661 DECREASED ROM OF RIGHT KNEE: Primary | ICD-10-CM

## 2023-05-26 DIAGNOSIS — Z96.651 STATUS POST UNICOMPARTMENTAL KNEE REPLACEMENT, RIGHT: ICD-10-CM

## 2023-05-26 DIAGNOSIS — R29.898 DECREASED STRENGTH OF LOWER EXTREMITY: ICD-10-CM

## 2023-05-26 PROCEDURE — 97110 THERAPEUTIC EXERCISES: CPT | Mod: PN,CQ

## 2023-05-26 PROCEDURE — 97112 NEUROMUSCULAR REEDUCATION: CPT | Mod: PN,CQ

## 2023-05-26 NOTE — PROGRESS NOTES
Physical Therapy Treatment Note     Name: Kailee Malone  Clinic Number: [id number]    Therapy Diagnosis:   Encounter Diagnoses   Name Primary?    Decreased ROM of right knee Yes    Status post unicompartmental knee replacement, right     Decreased strength of lower extremity      Physician: Dawood Jay MD    Visit Date: 5/26/2023    Physician Orders: PT Eval and Treat    Medical Diagnosis from Referral: right unicompartmental knee replacement   Evaluation Date: 4/13/2023  Updated Plan of Care Due : 6/17/2023  Authorization Period Expiration: humana 7/16/23  Plan of Care Expiration: human[employer] approved 20 visits plus eval thru 7/16  Visit # / Visits authorized: 12/ 30  PTA Visit #: 3    Time In: 800  Time Out: 845  Total Billable Time: 45 minutes     Precautions: Standard    Subjective     Pt reports:I saw  yesterday, and he gave me a shot in knee said scar tissue was causing my issues  She was compliant with home exercise program.  Response to previous treatment: sore  Functional change: none noted    Pain: 0/10  Location: right knee      Objective     Kailee received therapeutic exercises to develop strength, endurance, ROM, flexibility, and posture for 30 minutes including:    Bike x 5 minutes   Slant board for bilateral calf stretch x 2 minutes   Double support squats total gym x 20  Double support calf raises total gym x 20  Double support single support leg presses 20 x 60#  Double support calf presses 15 x 60#  Swissball knee flexion x 20 repetitions   Right knee flexion stretch on step x 5  Right sitting hamstrings stretch x 5     Range of motion right knee:   Flexion            125 degrees   Extension  0 degrees   Quad lag w/tke -5 degrees     Kailee participated in neuromuscular re-education activities to improve: Posture for 10 minutes. The following activities were included:  Short arc quads, terminal knee extension  each with electrical muscle stimulation x 5 minutes each    Home Exercises  Provided and Patient Education Provided     Education provided: continue home exercise program    Written Home Exercises Provided: Patient instructed to cont prior HEP.  Exercises were reviewed and Kailee was able to demonstrate them prior to the end of the session.  Kailee demonstrated good  understanding of the education provided.     Assessment   Pt  progressing with range of motion this treatment   Kailee Is progressing well towards her goals.   Pt prognosis is Excellent.     Pt will continue to benefit from skilled outpatient physical therapy to address the deficits listed in the problem list box on initial evaluation, provide pt/family education and to maximize pt's level of independence in the home and community environment.     Anticipated barriers to physical therapy: compliance with home exercise program     Goals:  Short Term Goals: 4 weeks   Pt will be independent with home ex program MET  Pt will be able to wean from walker to independent MET  Pt will be able to increase range of motion 0-125   Pt will be able to increase quad lag from -25 to 0 degrees   Return to driving   Increase strength to 3+/ 5     Long Term Goals: 6 weeks   Pt will ambulate without a limp   Be able to stoop and bend pain free  Be able to return to yardwork.    Plan     Plan of care Certification: 4/13/2023 to 6/13/2023.     Outpatient Physical Therapy 2 times weekly for 8 weeks to include the following interventions: Electrical Stimulation nmes , Neuromuscular Re-ed, and Therapeutic Exercise.     Plan of care has been reestablished with Carol HENSON, Esther HENSON, Cherelle Austin LPTA  and Briana HENSON.     Lucero Peña, PTA  5/26/2023

## 2023-06-06 ENCOUNTER — CLINICAL SUPPORT (OUTPATIENT)
Dept: REHABILITATION | Facility: HOSPITAL | Age: 78
End: 2023-06-06
Payer: MEDICARE

## 2023-06-06 DIAGNOSIS — Z96.651 STATUS POST UNICOMPARTMENTAL KNEE REPLACEMENT, RIGHT: ICD-10-CM

## 2023-06-06 DIAGNOSIS — R29.898 DECREASED STRENGTH OF LOWER EXTREMITY: ICD-10-CM

## 2023-06-06 DIAGNOSIS — M25.661 DECREASED ROM OF RIGHT KNEE: Primary | ICD-10-CM

## 2023-06-06 PROCEDURE — 97110 THERAPEUTIC EXERCISES: CPT | Mod: PN

## 2023-06-06 PROCEDURE — 97112 NEUROMUSCULAR REEDUCATION: CPT | Mod: PN

## 2023-06-06 NOTE — PLAN OF CARE
Physical Therapy Treatment Note     Name: Kailee Malone  Clinic Number: [id number]    Therapy Diagnosis:   Encounter Diagnoses   Name Primary?    Decreased ROM of right knee Yes    Status post unicompartmental knee replacement, right     Decreased strength of lower extremity      Physician: Dawood Jay MD    Visit Date: 6/6/2023    Physician Orders: PT Eval and Treat    Medical Diagnosis from Referral: right unicompartmental knee replacement   Evaluation Date: 4/13/2023  Updated Plan of Care Due : 6/17/2023  Authorization Period Expiration: humana 7/16/23  Plan of Care Expiration: human[employer] approved 20 visits plus eval thru 7/16  Visit # / Visits authorized: 12/ 30  PTA Visit #: 3    Time In: 800  Time Out: 845  Total Billable Time: 45 minutes     Precautions: Standard    Subjective     Pt reports:I saw  yesterday, and he gave me a shot in knee said scar tissue was causing my issues  She was compliant with home exercise program.  Response to previous treatment: sore  Functional change: none noted    Pain: 0/10  Location: right knee      Objective     Kailee received therapeutic exercises to develop strength, endurance, ROM, flexibility, and posture for 30 minutes including:    Bike x 5 minutes   Slant board for bilateral calf stretch x 2 minutes   Double support squats total gym x 20  Double support calf raises total gym x 20  Double support single support leg presses 20 x 60#  Double support calf presses 15 x 60#  Swissball knee flexion x 20 repetitions   Right knee flexion stretch on step x 5  Right sitting hamstrings stretch x 5     Range of motion right knee:   Flexion            132 degrees   Extension  0 degrees   Quad lag w/tke -4degrees     Kailee participated in neuromuscular re-education activities to improve: Posture for 10 minutes. The following activities were included:  Short arc quads, terminal knee extension  each with electrical muscle stimulation x 5 minutes each    Home Exercises Provided  and Patient Education Provided     Education provided: continue home exercise program    Written Home Exercises Provided: Patient instructed to cont prior HEP.  Exercises were reviewed and Kailee was able to demonstrate them prior to the end of the session.  Kailee demonstrated good  understanding of the education provided.     Assessment   Pt  progressing with range of motion this treatment   Kailee Is progressing well towards her goals.   Pt prognosis is Excellent.     Pt will continue to benefit from skilled outpatient physical therapy to address the deficits listed in the problem list box on initial evaluation, provide pt/family education and to maximize pt's level of independence in the home and community environment.     Anticipated barriers to physical therapy: compliance with home exercise program     Goals:  Short Term Goals: 4 weeks   Pt will be independent with home ex program MET  Pt will be able to wean from walker to independent MET  Pt will be able to increase range of motion 0-125   Pt will be able to increase quad lag from -25 to 0 degrees   Return to driving   Increase strength to 3+/ 5     Long Term Goals: 6 weeks   Pt will ambulate without a limp   Be able to stoop and bend pain free  Be able to return to yardwork.    Plan   Reasons for Recertification of Therapy: cont PT    Plan     Updated Certification Period: 6/6/2023 to 8/6/2023  Recommended Treatment Plan: 2 times per week for 8 weeks: Manual Therapy, Neuromuscular Re-ed, Patient Education, and Therapeutic Exercise  Other Recommendations: cont PT     Chase Martinez, PT  6/6/2023      I CERTIFY THE NEED FOR THESE SERVICES FURNISHED UNDER THIS PLAN OF TREATMENT AND WHILE UNDER MY CARE.    Physician's comments:      Physician's Signature: ___________________________________________________

## 2023-06-06 NOTE — PROGRESS NOTES
Physical Therapy Treatment Note     Name: Kailee Malone  Clinic Number: [id number]    Therapy Diagnosis:   Encounter Diagnoses   Name Primary?    Decreased ROM of right knee Yes    Status post unicompartmental knee replacement, right     Decreased strength of lower extremity      Physician: Dawood Jay MD    Visit Date: 6/6/2023    Physician Orders: PT Eval and Treat    Medical Diagnosis from Referral: right unicompartmental knee replacement   Evaluation Date: 4/13/2023  Updated Plan of Care Due : 6/17/2023  Authorization Period Expiration: humana 7/16/23  Plan of Care Expiration: human[employer] approved 20 visits plus eval thru 7/16  Visit # / Visits authorized: 12/ 30  PTA Visit #: 3    Time In: 800  Time Out: 845  Total Billable Time: 45 minutes     Precautions: Standard    Subjective     Pt reports:I saw  yesterday, and he gave me a shot in knee said scar tissue was causing my issues  She was compliant with home exercise program.  Response to previous treatment: sore  Functional change: none noted    Pain: 0/10  Location: right knee      Objective     Kailee received therapeutic exercises to develop strength, endurance, ROM, flexibility, and posture for 30 minutes including:    Bike x 5 minutes   Slant board for bilateral calf stretch x 2 minutes   Double support squats total gym x 20  Double support calf raises total gym x 20  Double support single support leg presses 20 x 60#  Double support calf presses 15 x 60#  Swissball knee flexion x 20 repetitions   Right knee flexion stretch on step x 5  Right sitting hamstrings stretch x 5     Range of motion right knee:   Flexion            132 degrees   Extension  0 degrees   Quad lag w/tke -4degrees     Kailee participated in neuromuscular re-education activities to improve: Posture for 10 minutes. The following activities were included:  Short arc quads, terminal knee extension  each with electrical muscle stimulation x 5 minutes each    Home Exercises Provided  and Patient Education Provided     Education provided: continue home exercise program    Written Home Exercises Provided: Patient instructed to cont prior HEP.  Exercises were reviewed and Kailee was able to demonstrate them prior to the end of the session.  Kailee demonstrated good  understanding of the education provided.     Assessment   Pt  progressing with range of motion this treatment   Kailee Is progressing well towards her goals.   Pt prognosis is Excellent.     Pt will continue to benefit from skilled outpatient physical therapy to address the deficits listed in the problem list box on initial evaluation, provide pt/family education and to maximize pt's level of independence in the home and community environment.     Anticipated barriers to physical therapy: compliance with home exercise program     Goals:  Short Term Goals: 4 weeks   Pt will be independent with home ex program MET  Pt will be able to wean from walker to independent MET  Pt will be able to increase range of motion 0-125   Pt will be able to increase quad lag from -25 to 0 degrees   Return to driving   Increase strength to 3+/ 5     Long Term Goals: 6 weeks   Pt will ambulate without a limp   Be able to stoop and bend pain free  Be able to return to yardwork.    Plan     Plan of care Certification: 4/13/2023 to 6/13/2023.     Outpatient Physical Therapy 2 times weekly for 8 weeks to include the following interventions: Electrical Stimulation nmes , Neuromuscular Re-ed, and Therapeutic Exercise.     Plan of care has been reestablished with Carol HENSON, Esther Guajardo LPTA, Cherelle Austin LPTA  and Briana HENSON.     Chase Martinez, PT  6/6/2023

## 2023-06-09 ENCOUNTER — CLINICAL SUPPORT (OUTPATIENT)
Dept: REHABILITATION | Facility: HOSPITAL | Age: 78
End: 2023-06-09
Payer: MEDICARE

## 2023-06-09 DIAGNOSIS — M25.661 DECREASED ROM OF RIGHT KNEE: Primary | ICD-10-CM

## 2023-06-09 DIAGNOSIS — Z96.651 STATUS POST UNICOMPARTMENTAL KNEE REPLACEMENT, RIGHT: ICD-10-CM

## 2023-06-09 DIAGNOSIS — R29.898 DECREASED STRENGTH OF LOWER EXTREMITY: ICD-10-CM

## 2023-06-09 PROCEDURE — 97110 THERAPEUTIC EXERCISES: CPT | Mod: PN,CQ

## 2023-06-09 PROCEDURE — 97112 NEUROMUSCULAR REEDUCATION: CPT | Mod: PN,CQ

## 2023-06-09 NOTE — PROGRESS NOTES
"  Physical Therapy Treatment Note     Name: Kailee Malone  Clinic Number: [id number]    Therapy Diagnosis:   Encounter Diagnoses   Name Primary?    Decreased ROM of right knee Yes    Status post unicompartmental knee replacement, right     Decreased strength of lower extremity      Physician: Dawood Jay MD    Visit Date: 6/9/2023    Physician Orders: PT Eval and Treat    Medical Diagnosis from Referral: right unicompartmental knee replacement   Evaluation Date: 4/13/2023  Updated Plan of Care Due : 6/17/2023  Authorization Period Expiration: humana 7/16/23  Plan of Care Expiration: humana approved 20 visits plus eval thru 7/16  Visit # / Visits authorized: 14/ 30  PTA Visit #: 1    Time In: 840  Time Out: 925  Total Billable Time: 45 minutes     Precautions: Standard    Subjective     Pt reports: I'm doing ok, my left knee is hurting some  She was compliant with home exercise program.  Response to previous treatment: sore  Functional change: none noted    Pain: 0/10  Location: right knee      Objective     Kailee received therapeutic exercises to develop strength, endurance, ROM, flexibility, and posture for 30 minutes including:    Bike x 5 minutes   Slant board for bilateral calf stretch x 2 minutes   Double support squats total gym x 20  Double support calf raises total gym x 20  Double support single support leg presses 20 x 60#  Double support calf presses 15 x 60#  Right reverse stepdown 6" x 15  Swissball knee flexion x 20 repetitions   Right knee flexion stretch on step x 5  Right sitting hamstrings stretch x 5     Range of motion right knee:   Flexion            132 degrees   Extension  0 degrees   Quad lag w/tke -4degrees     Kailee participated in neuromuscular re-education activities to improve: Posture for 15 minutes. The following activities were included:  Quad set x 20  Terminal knee extension 20 x 5#    Home Exercises Provided and Patient Education Provided     Education provided: continue " home exercise program    Written Home Exercises Provided: Patient instructed to cont prior HEP.  Exercises were reviewed and Kailee was able to demonstrate them prior to the end of the session.  Kailee demonstrated good  understanding of the education provided.     Assessment   Pt  progressing with vastus medialis oblique control  Kailee Is progressing well towards her goals.   Pt prognosis is Excellent.     Pt will continue to benefit from skilled outpatient physical therapy to address the deficits listed in the problem list box on initial evaluation, provide pt/family education and to maximize pt's level of independence in the home and community environment.     Anticipated barriers to physical therapy: compliance with home exercise program     Goals:  Short Term Goals: 4 weeks   Pt will be independent with home ex program MET  Pt will be able to wean from walker to independent MET  Pt will be able to increase range of motion 0-125- met   Pt will be able to increase quad lag from -25 to 0 degrees   Return to driving   Increase strength to 3+/ 5     Long Term Goals: 6 weeks   Pt will ambulate without a limp   Be able to stoop and bend pain free  Be able to return to yardwork.    Plan     Plan of care Certification: 4/13/2023 to 6/13/2023.     Outpatient Physical Therapy 2 times weekly for 8 weeks to include the following interventions: Electrical Stimulation nmes , Neuromuscular Re-ed, and Therapeutic Exercise.     Plan of care has been reestablished with Carol Peña LPTA, Esther Guajardo LPTA, Cherelle Austin LPTA  and Briana Mahmood LPTA.     Lucero Peña, PTA  6/9/2023

## 2023-06-13 ENCOUNTER — CLINICAL SUPPORT (OUTPATIENT)
Dept: REHABILITATION | Facility: HOSPITAL | Age: 78
End: 2023-06-13
Payer: MEDICARE

## 2023-06-13 DIAGNOSIS — R29.898 DECREASED STRENGTH OF LOWER EXTREMITY: ICD-10-CM

## 2023-06-13 DIAGNOSIS — Z96.651 STATUS POST UNICOMPARTMENTAL KNEE REPLACEMENT, RIGHT: ICD-10-CM

## 2023-06-13 DIAGNOSIS — M25.661 DECREASED ROM OF RIGHT KNEE: Primary | ICD-10-CM

## 2023-06-13 PROCEDURE — 97112 NEUROMUSCULAR REEDUCATION: CPT | Mod: PN

## 2023-06-13 PROCEDURE — 97110 THERAPEUTIC EXERCISES: CPT | Mod: PN

## 2023-06-13 NOTE — PROGRESS NOTES
"  Physical Therapy Treatment Note     Name: Kailee Malone  Clinic Number: [id number]    Therapy Diagnosis:   Encounter Diagnoses   Name Primary?    Decreased ROM of right knee Yes    Status post unicompartmental knee replacement, right     Decreased strength of lower extremity      Physician: Dawood Jay MD    Visit Date: 6/13/2023    Physician Orders: PT Eval and Treat    Medical Diagnosis from Referral: right unicompartmental knee replacement   Evaluation Date: 4/13/2023  Updated Plan of Care Due : 6/17/2023  Authorization Period Expiration: [employer] 7/16/23  Plan of Care Expiration: [employer] approved 20 visits plus eval thru 7/16  Visit # / Visits authorized: 15/ 30  PTA Visit #:     Time In: 1005  Time Out: 1050  Total Billable Time: 45 minutes     Precautions: Standard    Subjective     Pt reports: I'm doing well no knee pain   She was compliant with home exercise program.  Response to previous treatment: sore  Functional change: none noted    Pain: 0/10  Location: right knee      Objective     Kailee received therapeutic exercises to develop strength, endurance, ROM, flexibility, and posture for 30 minutes including:    Bike x 5 minutes   Slant board for bilateral calf stretch x 2 minutes   Double support squats total gym x 20  Double support calf raises total gym x 20  Double support single support leg presses 20 x 60#  Double support calf presses 15 x 60#  Right reverse stepdown 6" x 15  Swissball knee flexion x 20 repetitions   Right knee flexion stretch on step x 5  Right sitting hamstrings stretch x 5     Range of motion right knee:   Flexion            132 degrees   Extension  0 degrees   Quad lag w/tke -3degrees     Kailee participated in neuromuscular re-education activities to improve: Posture for 15 minutes. The following activities were included:  Quad set x 20  Terminal knee extension 20 x 5#    Home Exercises Provided and Patient Education Provided     Education provided: continue home " exercise program    Written Home Exercises Provided: Patient instructed to cont prior HEP.  Exercises were reviewed and Kailee was able to demonstrate them prior to the end of the session.  Kailee demonstrated good  understanding of the education provided.     Assessment   Pt  improving with quad strength.  -3 lag   Kailee Is progressing well towards her goals.   Pt prognosis is Excellent.     Pt will continue to benefit from skilled outpatient physical therapy to address the deficits listed in the problem list box on initial evaluation, provide pt/family education and to maximize pt's level of independence in the home and community environment.     Anticipated barriers to physical therapy: compliance with home exercise program     Goals:  Short Term Goals: 4 weeks   Pt will be independent with home ex program MET  Pt will be able to wean from walker to independent MET  Pt will be able to increase range of motion 0-125- met   Pt will be able to increase quad lag from -25 to 0 degrees   Return to driving   Increase strength to 3+/ 5     Long Term Goals: 6 weeks   Pt will ambulate without a limp   Be able to stoop and bend pain free  Be able to return to yardwork.    Plan     Plan of care Certification: 4/13/2023 to 6/13/2023.     Outpatient Physical Therapy 2 times weekly for 8 weeks to include the following interventions: Electrical Stimulation nmes , Neuromuscular Re-ed, and Therapeutic Exercise.     Plan of care has been reestablished with Carol Peña LPTA, Esther Guajardo LPTA, Cherelle Austin LPTA  and Briana Mahmood LPTA.     Chase Martinez, PT  6/13/2023

## 2023-06-15 ENCOUNTER — CLINICAL SUPPORT (OUTPATIENT)
Dept: REHABILITATION | Facility: HOSPITAL | Age: 78
End: 2023-06-15
Payer: MEDICARE

## 2023-06-15 DIAGNOSIS — R29.898 DECREASED STRENGTH OF LOWER EXTREMITY: ICD-10-CM

## 2023-06-15 DIAGNOSIS — M25.661 DECREASED ROM OF RIGHT KNEE: Primary | ICD-10-CM

## 2023-06-15 DIAGNOSIS — Z96.651 STATUS POST UNICOMPARTMENTAL KNEE REPLACEMENT, RIGHT: ICD-10-CM

## 2023-06-15 PROCEDURE — 97110 THERAPEUTIC EXERCISES: CPT | Mod: PN,CQ

## 2023-06-15 PROCEDURE — 97112 NEUROMUSCULAR REEDUCATION: CPT | Mod: PN,CQ

## 2023-06-15 NOTE — PROGRESS NOTES
Physical Therapy Treatment Note     Name: Kailee Malone  Clinic Number: [id number]    Therapy Diagnosis:   Encounter Diagnoses   Name Primary?    Decreased ROM of right knee Yes    Status post unicompartmental knee replacement, right     Decreased strength of lower extremity      Physician: Dawood Jay MD    Visit Date: 6/15/2023    Physician Orders: PT Eval and Treat    Medical Diagnosis from Referral: right unicompartmental knee replacement   Evaluation Date: 4/13/2023  Updated Plan of Care Due : 6/17/2023  Authorization Period Expiration: [employer] 7/16/23  Plan of Care Expiration: [employer] approved 20 visits plus eval thru 7/16  Visit # / Visits authorized: 16/ 30  PTA Visit #: 1    Time In: 1010  Time Out: 1050  Total Billable Time: 40 minutes   Ortho 6/28/23    Precautions: Standard    Subjective     Pt reports: I'm having some pain getting up from sitting, but I think it's my left knee.  She was compliant with home exercise program.  Response to previous treatment: sore  Functional change: none noted    Pain: 0/10  Location: right knee      Objective     Kailee received therapeutic exercises to develop strength, endurance, ROM, flexibility, and posture for 25 minutes including:    Bike x 8 minutes   Slant board for bilateral calf stretch x 2 minutes   Double support squats total gym x 20  Double support calf raises total gym x 20  Double support single support leg presses 20 x 60#  Double support calf presses 15 x 60#  Swissball knee flexion x 20 repetitions   Right sitting hamstrings stretch x 5     Range of motion right knee:   Flexion            131 degrees   Extension  0 degrees   Quad lag w/tke -3 degrees     Kailee participated in neuromuscular re-education activities to improve: Posture for 15 minutes. The following activities were included:  Quad set x 20  Terminal knee extension 20 x 5#  Slant board x 2'  Right single support balance x 5  Right single support standing calf raises x 8    Home  Exercises Provided and Patient Education Provided     Education provided: continue home exercise program    Written Home Exercises Provided: Patient instructed to cont prior HEP.  Exercises were reviewed and Kailee was able to demonstrate them prior to the end of the session.  Kailee demonstrated good  understanding of the education provided.     Assessment   Pt  c/o left knee pain with total gym exercises, instructed to include single support balance with home exercise program   Kailee Is progressing well towards her goals.   Pt prognosis is Excellent.     Pt will continue to benefit from skilled outpatient physical therapy to address the deficits listed in the problem list box on initial evaluation, provide pt/family education and to maximize pt's level of independence in the home and community environment.     Anticipated barriers to physical therapy: compliance with home exercise program     Goals:  Short Term Goals: 4 weeks   Pt will be independent with home ex program MET  Pt will be able to wean from walker to independent MET  Pt will be able to increase range of motion 0-125- met   Pt will be able to increase quad lag from -25 to 0 degrees   Return to driving   Increase strength to 3+/ 5     Long Term Goals: 6 weeks   Pt will ambulate without a limp   Be able to stoop and bend pain free  Be able to return to yardwork.    Plan     Plan of care Certification: 4/13/2023 to 6/13/2023.     Outpatient Physical Therapy 2 times weekly for 8 weeks to include the following interventions: Electrical Stimulation nmes , Neuromuscular Re-ed, and Therapeutic Exercise.     Plan of care has been reestablished with Carol Peña LPTA, Esther Guajardo LPTA, Cherelle Austin LPTA  and Briana Mahmood LPTA.     Lucero Peña, PTA  6/15/2023

## 2023-06-20 ENCOUNTER — CLINICAL SUPPORT (OUTPATIENT)
Dept: REHABILITATION | Facility: HOSPITAL | Age: 78
End: 2023-06-20
Payer: MEDICARE

## 2023-06-20 DIAGNOSIS — M25.661 DECREASED ROM OF RIGHT KNEE: Primary | ICD-10-CM

## 2023-06-20 DIAGNOSIS — Z96.651 STATUS POST UNICOMPARTMENTAL KNEE REPLACEMENT, RIGHT: ICD-10-CM

## 2023-06-20 DIAGNOSIS — R29.898 DECREASED STRENGTH OF LOWER EXTREMITY: ICD-10-CM

## 2023-06-20 PROCEDURE — 97112 NEUROMUSCULAR REEDUCATION: CPT | Mod: PN,CQ

## 2023-06-20 PROCEDURE — 97110 THERAPEUTIC EXERCISES: CPT | Mod: PN,CQ

## 2023-06-20 NOTE — PROGRESS NOTES
"  Physical Therapy Treatment Note     Name: Kailee Malone  Clinic Number: [id number]    Therapy Diagnosis:   Encounter Diagnoses   Name Primary?    Decreased ROM of right knee Yes    Status post unicompartmental knee replacement, right     Decreased strength of lower extremity      Physician: Dawood Jay MD    Visit Date: 6/20/2023    Physician Orders: PT Eval and Treat    Medical Diagnosis from Referral: right unicompartmental knee replacement   Evaluation Date: 4/13/2023  Updated Plan of Care Due : 6/17/2023  Authorization Period Expiration: [employer] 7/16/23  Plan of Care Expiration: [employer] approved 20 visits plus eval thru 7/16  Visit # / Visits authorized: 17/ 30  PTA Visit #: 2    Time In: 1006  Time Out: 1046  Total Billable Time: 40 minutes   Ortho 6/28/23    Precautions: Standard    Subjective     Pt reports: my bad knee is bothering me more than right knee. I just feel drained, think I need some blood work done.  She was compliant with home exercise program.  Response to previous treatment: sore  Functional change: none noted    Pain: 0/10  Location: right knee      Objective     Kailee received therapeutic exercises to develop strength, endurance, ROM, flexibility, and posture for 25 minutes including:    Bike x 6 minutes   Slant board for bilateral calf stretch x 2 minutes   Double support squats total gym x 20  Double support calf raises total gym x 20  Right single support leg presses 20 x 55#  Double support calf presses 15 x 55#  Right lateral step downs 6" x 10  Swissball knee flexion x 20 repetitions   Right sitting hamstrings stretch x 5     Range of motion right knee:   Flexion            128 degrees   Extension  0 degrees   Quad lag w/tke -3 degrees     Kailee participated in neuromuscular re-education activities to improve: Posture for 15 minutes. The following activities were included:  Quad set x 20  Terminal knee extension 20 x 5#  Slant board x 2'      Home Exercises Provided and " Patient Education Provided     Education provided: continue home exercise program    Written Home Exercises Provided: Patient instructed to cont prior HEP.  Exercises were reviewed and Kailee was able to demonstrate them prior to the end of the session.  Kailee demonstrated good  understanding of the education provided.     Assessment   Pt able to perform single support leg presses without c/o pain  Kailee Is progressing well towards her goals.   Pt prognosis is Excellent.     Pt will continue to benefit from skilled outpatient physical therapy to address the deficits listed in the problem list box on initial evaluation, provide pt/family education and to maximize pt's level of independence in the home and community environment.     Anticipated barriers to physical therapy: compliance with home exercise program     Goals:  Short Term Goals: 4 weeks   Pt will be independent with home ex program MET  Pt will be able to wean from walker to independent MET  Pt will be able to increase range of motion 0-125- met   Pt will be able to increase quad lag from -25 to 0 degrees   Return to driving   Increase strength to 3+/ 5     Long Term Goals: 6 weeks   Pt will ambulate without a limp   Be able to stoop and bend pain free  Be able to return to yardwork.    Plan     Plan of care Certification: 4/13/2023 to 6/13/2023.     Outpatient Physical Therapy 2 times weekly for 8 weeks to include the following interventions: Electrical Stimulation nmes , Neuromuscular Re-ed, and Therapeutic Exercise.     Plan of care has been reestablished with Carol HENSON, Esther Guajardo LPTA, Cherelle Austin LPTA  and Briana Mahmood LPTA.     Lucero Peña, PTA  6/20/2023

## 2023-06-27 ENCOUNTER — CLINICAL SUPPORT (OUTPATIENT)
Dept: REHABILITATION | Facility: HOSPITAL | Age: 78
End: 2023-06-27
Payer: MEDICARE

## 2023-06-27 DIAGNOSIS — R29.898 DECREASED STRENGTH OF LOWER EXTREMITY: ICD-10-CM

## 2023-06-27 DIAGNOSIS — Z96.651 STATUS POST UNICOMPARTMENTAL KNEE REPLACEMENT, RIGHT: ICD-10-CM

## 2023-06-27 DIAGNOSIS — M25.661 DECREASED ROM OF RIGHT KNEE: Primary | ICD-10-CM

## 2023-06-27 PROCEDURE — 97112 NEUROMUSCULAR REEDUCATION: CPT | Mod: PN,CQ

## 2023-06-27 PROCEDURE — 97110 THERAPEUTIC EXERCISES: CPT | Mod: PN,CQ

## 2023-06-27 NOTE — PROGRESS NOTES
Physical Therapy Treatment Note     Name: Kailee Malone  Clinic Number: [id number]    Therapy Diagnosis:   Encounter Diagnoses   Name Primary?    Decreased ROM of right knee Yes    Status post unicompartmental knee replacement, right     Decreased strength of lower extremity      Physician: Dawood Jay MD    Visit Date: 6/27/2023    Physician Orders: PT Eval and Treat    Medical Diagnosis from Referral: right unicompartmental knee replacement   Evaluation Date: 4/13/2023  Updated Plan of Care Due : 6/17/2023  Authorization Period Expiration: [employer] 7/16/23  Plan of Care Expiration: human[employer] approved 20 visits plus eval thru 7/16  Visit # / Visits authorized: 18/ 30  PTA Visit #: 3    Time In: 1015  Time Out: 1100  Total Billable Time: 45 minutes   Ortho 6/28/23    Precautions: Standard    Subjective     Pt reports: My left knee is really bothering me. I see neurologist marv, I'm having trouble walking and getting up and down.  She was compliant with home exercise program.  Response to previous treatment: sore  Functional change: none noted    Pain: 0/10  Location: right knee      Objective     Kailee received therapeutic exercises to develop strength, endurance, ROM, flexibility, and posture for 30 minutes including:    Bike x 6 minutes   Sit to stand x 10  Slant board for bilateral calf stretch x 2 minutes   Double support squats total gym x 20  Double support calf raises total gym x 20  Right single support leg presses 20 x 55#  Double support calf presses 15 x 55#  Bilateral hip flexion on cybex 10 x 20#  Swissball bridging x 10  Swissball knee flexion x 20 repetitions   Right sitting hamstrings stretch x 5     Range of motion right knee:   Flexion            126 degrees   Extension  0 degrees   Quad lag w/tke -3 degrees     Kailee participated in neuromuscular re-education activities to improve: Posture for 15 minutes. The following activities were included:  Quad set x 20  Terminal knee extension 20 x  5#  Slant board x 2'    Home Exercises Provided and Patient Education Provided     Education provided: continue home exercise program    Written Home Exercises Provided: Patient instructed to cont prior HEP.  Exercises were reviewed and Kailee was able to demonstrate them prior to the end of the session.  Kailee demonstrated good  understanding of the education provided.     Assessment   Pt instructed to include sit to stand with home exercise program and hip flexion when exercising in pool  Kailee Is progressing well towards her goals.   Pt prognosis is Excellent.     Pt will continue to benefit from skilled outpatient physical therapy to address the deficits listed in the problem list box on initial evaluation, provide pt/family education and to maximize pt's level of independence in the home and community environment.     Anticipated barriers to physical therapy: compliance with home exercise program     Goals:  Short Term Goals: 4 weeks   Pt will be independent with home ex program MET  Pt will be able to wean from walker to independent MET  Pt will be able to increase range of motion 0-125- met   Pt will be able to increase quad lag from -25 to 0 degrees   Return to driving -met  Increase strength to 3+/ 5     Long Term Goals: 6 weeks   Pt will ambulate without a limp   Be able to stoop and bend pain free  Be able to return to yardwork.-met    Plan     Plan of care Certification: 4/13/2023 to 6/13/2023.     Outpatient Physical Therapy 2 times weekly for 8 weeks to include the following interventions: Electrical Stimulation nmes , Neuromuscular Re-ed, and Therapeutic Exercise.     Plan of care has been reestablished with Carol Peña LPTA, Esther Guajardo LPTA, Cherelle Austin LPTA  and Briana Mahmood LPTA.     Lucero Peña, PTA  6/27/2023

## 2023-06-28 ENCOUNTER — OFFICE VISIT (OUTPATIENT)
Dept: NEUROLOGY | Facility: CLINIC | Age: 78
End: 2023-06-28
Payer: MEDICARE

## 2023-06-28 VITALS
HEIGHT: 65 IN | SYSTOLIC BLOOD PRESSURE: 114 MMHG | WEIGHT: 182 LBS | HEART RATE: 70 BPM | DIASTOLIC BLOOD PRESSURE: 80 MMHG | OXYGEN SATURATION: 96 % | BODY MASS INDEX: 30.32 KG/M2

## 2023-06-28 DIAGNOSIS — E55.9 VITAMIN D DEFICIENCY: ICD-10-CM

## 2023-06-28 DIAGNOSIS — R25.1 TREMORS OF NERVOUS SYSTEM: ICD-10-CM

## 2023-06-28 PROCEDURE — 3079F PR MOST RECENT DIASTOLIC BLOOD PRESSURE 80-89 MM HG: ICD-10-PCS | Mod: CPTII,,, | Performed by: NURSE PRACTITIONER

## 2023-06-28 PROCEDURE — 3074F SYST BP LT 130 MM HG: CPT | Mod: CPTII,,, | Performed by: NURSE PRACTITIONER

## 2023-06-28 PROCEDURE — 1126F PR PAIN SEVERITY QUANTIFIED, NO PAIN PRESENT: ICD-10-PCS | Mod: CPTII,,, | Performed by: NURSE PRACTITIONER

## 2023-06-28 PROCEDURE — 1101F PT FALLS ASSESS-DOCD LE1/YR: CPT | Mod: CPTII,,, | Performed by: NURSE PRACTITIONER

## 2023-06-28 PROCEDURE — 3074F PR MOST RECENT SYSTOLIC BLOOD PRESSURE < 130 MM HG: ICD-10-PCS | Mod: CPTII,,, | Performed by: NURSE PRACTITIONER

## 2023-06-28 PROCEDURE — 1160F RVW MEDS BY RX/DR IN RCRD: CPT | Mod: CPTII,,, | Performed by: NURSE PRACTITIONER

## 2023-06-28 PROCEDURE — 1159F PR MEDICATION LIST DOCUMENTED IN MEDICAL RECORD: ICD-10-PCS | Mod: CPTII,,, | Performed by: NURSE PRACTITIONER

## 2023-06-28 PROCEDURE — 3079F DIAST BP 80-89 MM HG: CPT | Mod: CPTII,,, | Performed by: NURSE PRACTITIONER

## 2023-06-28 PROCEDURE — 1159F MED LIST DOCD IN RCRD: CPT | Mod: CPTII,,, | Performed by: NURSE PRACTITIONER

## 2023-06-28 PROCEDURE — 1101F PR PT FALLS ASSESS DOC 0-1 FALLS W/OUT INJ PAST YR: ICD-10-PCS | Mod: CPTII,,, | Performed by: NURSE PRACTITIONER

## 2023-06-28 PROCEDURE — 1160F PR REVIEW ALL MEDS BY PRESCRIBER/CLIN PHARMACIST DOCUMENTED: ICD-10-PCS | Mod: CPTII,,, | Performed by: NURSE PRACTITIONER

## 2023-06-28 PROCEDURE — 1126F AMNT PAIN NOTED NONE PRSNT: CPT | Mod: CPTII,,, | Performed by: NURSE PRACTITIONER

## 2023-06-28 PROCEDURE — 99214 OFFICE O/P EST MOD 30 MIN: CPT | Mod: PBBFAC | Performed by: NURSE PRACTITIONER

## 2023-06-28 PROCEDURE — 99213 OFFICE O/P EST LOW 20 MIN: CPT | Mod: S$PBB,,, | Performed by: NURSE PRACTITIONER

## 2023-06-28 PROCEDURE — 99213 PR OFFICE/OUTPT VISIT, EST, LEVL III, 20-29 MIN: ICD-10-PCS | Mod: S$PBB,,, | Performed by: NURSE PRACTITIONER

## 2023-06-28 PROCEDURE — 3288F PR FALLS RISK ASSESSMENT DOCUMENTED: ICD-10-PCS | Mod: CPTII,,, | Performed by: NURSE PRACTITIONER

## 2023-06-28 PROCEDURE — 3288F FALL RISK ASSESSMENT DOCD: CPT | Mod: CPTII,,, | Performed by: NURSE PRACTITIONER

## 2023-06-28 RX ORDER — ERGOCALCIFEROL 1.25 MG/1
50000 CAPSULE ORAL
Qty: 3 CAPSULE | Refills: 3 | Status: SHIPPED | OUTPATIENT
Start: 2023-06-28

## 2023-06-28 RX ORDER — PROPRANOLOL HYDROCHLORIDE 60 MG/1
TABLET ORAL
Qty: 90 TABLET | Refills: 3 | Status: SHIPPED | OUTPATIENT
Start: 2023-06-28 | End: 2023-10-10

## 2023-06-28 RX ORDER — SULFAMETHOXAZOLE AND TRIMETHOPRIM 800; 160 MG/1; MG/1
TABLET ORAL
COMMUNITY
Start: 2023-06-27 | End: 2023-07-27 | Stop reason: SDUPTHER

## 2023-06-28 RX ORDER — PRIMIDONE 50 MG/1
TABLET ORAL
Qty: 90 TABLET | Refills: 3 | Status: SHIPPED | OUTPATIENT
Start: 2023-06-28 | End: 2023-10-10 | Stop reason: SDUPTHER

## 2023-06-28 NOTE — PROGRESS NOTES
Subjective:       Patient ID: Kailee Malone is a 78 y.o. female     Chief Complaint:    No chief complaint on file.       Allergies:  Betadine prepstick [povidone-iodine-ethyl alcohol] and Penicillins    Current Medications:    Outpatient Encounter Medications as of 6/28/2023   Medication Sig Dispense Refill    alendronate (FOSAMAX) 70 MG tablet Take 70 mg by mouth every 7 days.      busPIRone (BUSPAR) 5 MG Tab Take 5 mg by mouth 2 (two) times daily.      colestipoL (COLESTID) 1 gram Tab Take 1 tablet (1 g total) by mouth 2 (two) times daily. 180 tablet 1    ergocalciferol (ERGOCALCIFEROL) 50,000 unit Cap Take one capsule weekly for 12 weeks then reduce to one capsule monthly 12 capsule 1    EScitalopram oxalate (LEXAPRO) 20 MG tablet Take 20 mg by mouth.      estradioL (ESTRACE) 0.01 % (0.1 mg/gram) vaginal cream Place 1 g vaginally once daily. (Patient not taking: Reported on 1/18/2023) 42.5 g 6    FLUoxetine 20 MG capsule Take 20 mg by mouth once daily.      ketoconazole (NIZORAL) 2 % cream APPLY TO AFFECTED AREAS TWICE DAILY UNTIL CLEAR      ketoconazole (NIZORAL) 2 % shampoo APPLY ON BACK LEAVE ON FOR 5 MINUTES THEN RINSE, 2 TO 3 TIMES WEEKLY      lansoprazole (PREVACID) 30 MG capsule Take 1 capsule (30 mg total) by mouth once daily. 90 capsule 3    predniSONE (DELTASONE) 5 MG tablet Take 5 mg by mouth.      primidone (MYSOLINE) 50 MG Tab Take one tablet at bedtime 90 tablet 3    propranoloL (INDERAL) 60 MG tablet Take one tablet daily, monitor heart rate and blood pressure before taking 90 tablet 3    triamterene-hydrochlorothiazide 37.5-25 mg (MAXZIDE-25) 37.5-25 mg per tablet Take 1 tablet by mouth once daily.       No facility-administered encounter medications on file as of 6/28/2023.       History of Present Illness  79 y/o female following in neurology for tremors.    Tremor onset around 2003.  Has had worsening tremor in past due to anxiety and depression symptoms as well.  On primidone 25mg  at night and propranolol 60mg daily.  In past higher dosing of propranolol caused bradycardia.  She reported last visit, just 6 weeks ago, her tremor was doing much better, this apparently after she was taken off prozac about 6 weeks before and changed to lexapro.  We opted to wean her off the primidone to see if it was still beneficial thus her followup today for recheck.  She reports tremor did certainly get worse.  She is now back on 25mg at night, and continues the propranolol  it is better, she is not clear on whether or not she wants to increase so for now will keep at 25mg.    MRI of the brain with and without contrast done on April 12, 2022 showed no acute abnormality.             Review of Systems  Review of Systems   Constitutional:  Negative for diaphoresis and fever.   HENT:  Negative for congestion, hearing loss and tinnitus.    Eyes:  Negative for blurred vision, double vision, photophobia, discharge and redness.   Respiratory:  Negative for cough and shortness of breath.    Cardiovascular:  Negative for chest pain.   Gastrointestinal:  Negative for abdominal pain, nausea and vomiting.   Musculoskeletal:  Negative for back pain, joint pain, myalgias and neck pain.   Skin:  Negative for itching and rash.   Neurological:  Positive for tremors. Negative for dizziness, sensory change, speech change, focal weakness, seizures, loss of consciousness, weakness and headaches.   Psychiatric/Behavioral:  Negative for depression, hallucinations and memory loss. The patient does not have insomnia.    All other systems reviewed and are negative.   Objective:     NEUROLOGICAL EXAMINATION:     MENTAL STATUS   Oriented to person, place, and time.   Registration: recalls 3 of 3 objects. Recall at 5 minutes: recalls 3 of 3 objects.   Attention: normal. Concentration: normal.   Speech: speech is normal   Level of consciousness: alert  Knowledge: good and consistent with education.   Normal comprehension.     CRANIAL  NERVES     CN II   Visual fields full to confrontation.   Visual acuity: normal  Right visual field deficit: none  Left visual field deficit: none     CN III, IV, VI   Pupils are equal, round, and reactive to light.  Extraocular motions are normal.   Right pupil: Size: 3 mm. Shape: regular. Reactivity: brisk. Consensual response: intact. Accommodation: intact.   Left pupil: Size: 3 mm. Shape: regular. Reactivity: brisk. Consensual response: intact. Accommodation: intact.   CN III: no CN III palsy  CN VI: no CN VI palsy  Nystagmus: none   Diplopia: none  Upgaze: normal  Downgaze: normal  Conjugate gaze: present  Vestibulo-ocular reflex: present    CN V   Facial sensation intact.   Right facial sensation deficit: none  Left facial sensation deficit: none  Right corneal reflex: normal  Left corneal reflex: normal    CN VII   Facial expression full, symmetric.   Right facial weakness: none  Left facial weakness: none  Right taste: normal  Left taste: normal    CN VIII   CN VIII normal.   Hearing: intact    CN IX, X   CN IX normal.   CN X normal.   Palate: symmetric    CN XI   CN XI normal.   Right sternocleidomastoid strength: normal  Left sternocleidomastoid strength: normal  Right trapezius strength: normal  Left trapezius strength: normal    CN XII   CN XII normal.   Tongue: not atrophic  Fasciculations: absent  Tongue deviation: none    MOTOR EXAM   Muscle bulk: normal  Overall muscle tone: normal  Right arm tone: normal  Left arm tone: normal  Right arm pronator drift: absent  Left arm pronator drift: absent  Right leg tone: normal  Left leg tone: normal    Strength   Right neck flexion: 5/5  Left neck flexion: 5/5  Right neck extension: 5/5  Left neck extension: 5/5  Right deltoid: 5/5  Left deltoid: 5/5  Right biceps: 5/5  Left biceps: 5/5  Right triceps: 5/5  Left triceps: 5/5  Right wrist flexion: 5/5  Left wrist flexion: 5/5  Right wrist extension: 5/5  Left wrist extension: 5/5  Right interossei: 5/5  Left  interossei: 5/5  Right iliopsoas: 5/5  Left iliopsoas: 5/5  Right quadriceps: 5/5  Left quadriceps: 5/5  Right hamstrin/5  Left hamstrin/5  Right anterior tibial: 5/5  Left anterior tibial: 5/5  Right posterior tibial: 5/5  Left posterior tibial: 5/5  Right gastroc: 5/5  Left gastroc: 5/5    REFLEXES     Reflexes   Right brachioradialis: 2+  Left brachioradialis: 2+  Right biceps: 2+  Left biceps: 2+  Right triceps: 2+  Left triceps: 2+  Right patellar: 2+  Left patellar: 2+  Right achilles: 2+  Left achilles: 2+  Right plantar: normal  Left plantar: normal  Right Leung: absent  Left Leung: absent  Right ankle clonus: absent  Left ankle clonus: absent  Right pendular knee jerk: absent  Left pendular knee jerk: absent    SENSORY EXAM   Light touch normal.   Right arm light touch: normal  Left arm light touch: normal  Right leg light touch: normal  Left leg light touch: normal  Vibration normal.   Right arm vibration: normal  Left arm vibration: normal  Right leg vibration: normal  Left leg vibration: normal  Proprioception normal.   Right arm proprioception: normal  Left arm proprioception: normal  Right leg proprioception: normal  Left leg proprioception: normal  Pinprick normal.   Right arm pinprick: normal  Left arm pinprick: normal  Right leg pinprick: normal  Left leg pinprick: normal  Graphesthesia: normal  Romberg: negative  Stereognosis: normal    GAIT AND COORDINATION     Gait  Gait: normal     Coordination   Finger to nose coordination: normal  Heel to shin coordination: normal  Tandem walking coordination: normal    Tremor   Resting tremor: absent  Intention tremor: absent  Action tremor: absent     Physical Exam  Vitals and nursing note reviewed.   Constitutional:       Appearance: Normal appearance.   HENT:      Head: Normocephalic.   Eyes:      Extraocular Movements: Extraocular movements intact and EOM normal.      Pupils: Pupils are equal, round, and reactive to light.   Cardiovascular:       Rate and Rhythm: Normal rate and regular rhythm.   Pulmonary:      Effort: Pulmonary effort is normal.      Breath sounds: Normal breath sounds.   Musculoskeletal:         General: No swelling or tenderness. Normal range of motion.      Cervical back: Normal range of motion and neck supple.      Right lower leg: No edema.      Left lower leg: No edema.   Skin:     General: Skin is warm and dry.      Coloration: Skin is not jaundiced.      Findings: No rash.   Neurological:      General: No focal deficit present.      Mental Status: She is alert and oriented to person, place, and time.      GCS: GCS eye subscore is 4. GCS verbal subscore is 5. GCS motor subscore is 6.      Cranial Nerves: No cranial nerve deficit.      Sensory: No sensory deficit.      Motor: Motor function is intact. No weakness.      Coordination: Coordination is intact. Coordination normal. Finger-Nose-Finger Test, Heel to Shin Test and Romberg Test normal.      Gait: Gait is intact. Gait and tandem walk normal.      Deep Tendon Reflexes: Reflexes normal.      Reflex Scores:       Tricep reflexes are 2+ on the right side and 2+ on the left side.       Bicep reflexes are 2+ on the right side and 2+ on the left side.       Brachioradialis reflexes are 2+ on the right side and 2+ on the left side.       Patellar reflexes are 2+ on the right side and 2+ on the left side.       Achilles reflexes are 2+ on the right side and 2+ on the left side.  Psychiatric:         Mood and Affect: Mood normal.         Speech: Speech normal.         Behavior: Behavior normal.        Assessment:     Problem List Items Addressed This Visit    None         Primary Diagnosis and ICD10  No primary diagnosis found.    Plan:     There are no Patient Instructions on file for this visit.    There are no discontinued medications.    Requested Prescriptions      No prescriptions requested or ordered in this encounter       No orders of the defined types were placed in this  encounter.

## 2023-06-28 NOTE — PATIENT INSTRUCTIONS
Continue the propranolol 60mg daily  Continue the primidone 25mg at night, but can increase to 50mg if desired  Recommend start the prescribed antibiotics for the current UTI

## 2023-07-10 ENCOUNTER — CLINICAL SUPPORT (OUTPATIENT)
Dept: REHABILITATION | Facility: HOSPITAL | Age: 78
End: 2023-07-10
Payer: MEDICARE

## 2023-07-10 DIAGNOSIS — R29.898 DECREASED STRENGTH OF LOWER EXTREMITY: ICD-10-CM

## 2023-07-10 DIAGNOSIS — M25.661 DECREASED ROM OF RIGHT KNEE: Primary | ICD-10-CM

## 2023-07-10 DIAGNOSIS — Z96.651 STATUS POST UNICOMPARTMENTAL KNEE REPLACEMENT, RIGHT: ICD-10-CM

## 2023-07-10 PROCEDURE — 97112 NEUROMUSCULAR REEDUCATION: CPT | Mod: PN,CQ

## 2023-07-10 PROCEDURE — 97110 THERAPEUTIC EXERCISES: CPT | Mod: PN,CQ

## 2023-07-10 NOTE — PROGRESS NOTES
Physical Therapy Treatment Note     Name: Kailee Malone  Clinic Number: [id number]    Therapy Diagnosis:   Encounter Diagnoses   Name Primary?    Decreased ROM of right knee Yes    Status post unicompartmental knee replacement, right     Decreased strength of lower extremity      Physician: Dawood Jay MD    Visit Date: 7/10/2023    Physician Orders: PT Eval and Treat    Medical Diagnosis from Referral: right unicompartmental knee replacement   Evaluation Date: 4/13/2023  Updated Plan of Care Due : 6/17/2023  Authorization Period Expiration: [employer] 7/16/23  Plan of Care Expiration: [employer] approved 20 visits plus eval thru 7/16  Visit # / Visits authorized: 19/ 30  PTA Visit #: 4    Time In: 1015  Time Out: 1104  Total Billable Time: 49 minutes   Ortho 6/28/23    Precautions: Standard    Subjective     Pt reports:  She has been good other than having a fall on Saturday due to losing her balance,States she feel hard on her right side and bruised her right palm. States she didn't hit her head. States her Right LE has been swelling some when she is up on her feet since the fall.  She was compliant with home exercise program.  Response to previous treatment: sore  Functional change: none noted    Pain: 0/10  Location: right knee      Objective     Kailee received therapeutic exercises to develop strength, endurance, ROM, flexibility, and posture for 30 minutes including:    Bike x 6 minutes   Sit to stand x 10  Slant board for bilateral calf stretch x 2 minutes   Double support squats total gym x 20  Double support calf raises total gym x 20  Double support leg presses 20 x 60#  Double support calf presses 15 x 60#  Bilateral hip flexion on cybex 15 x 20#  Swissball bridging x 10  Swissball knee flexion x 20 repetitions   Right sitting hamstrings stretch 3 x 30 seconds     Range of motion right knee:   Flexion            126 degrees   Extension  0 degrees   Quad lag w/tke -2 degrees     Kailee participated in  neuromuscular re-education activities to improve: Posture for 15 minutes. The following activities were included:  Quad set x 20  Terminal knee extension 20 x 5#  Slant board x 2'    Home Exercises Provided and Patient Education Provided     Education provided: continue home exercise program    Written Home Exercises Provided: Patient instructed to cont prior HEP.  Exercises were reviewed and Kailee was able to demonstrate them prior to the end of the session.  Kailee demonstrated good  understanding of the education provided.     Assessment   Pt instructed to include sit to stand with home exercise program and hip flexion when exercising in pool  Kailee Is progressing well towards her goals.   Pt prognosis is Excellent.     Pt will continue to benefit from skilled outpatient physical therapy to address the deficits listed in the problem list box on initial evaluation, provide pt/family education and to maximize pt's level of independence in the home and community environment.     Anticipated barriers to physical therapy: compliance with home exercise program     Goals:  Short Term Goals: 4 weeks   Pt will be independent with home ex program MET  Pt will be able to wean from walker to independent MET  Pt will be able to increase range of motion 0-125- met   Pt will be able to increase quad lag from -25 to 0 degrees   Return to driving -met  Increase strength to 3+/ 5     Long Term Goals: 6 weeks   Pt will ambulate without a limp   Be able to stoop and bend pain free  Be able to return to yardwork.-met    Plan     Plan of care Certification: 4/13/2023 to 6/13/2023.     Outpatient Physical Therapy 2 times weekly for 8 weeks to include the following interventions: Electrical Stimulation nmes , Neuromuscular Re-ed, and Therapeutic Exercise.     Plan of care has been reestablished with Carol Peña LPTA, Esther Guajardo LPTA, Cherelle Austin LPTA  and Briana Mahmood LPTA.     Cherelle Austin, PTA  7/10/2023                                        Physical Therapy Treatment Note     Name: Kailee Malone  Clinic Number: [id number]    Therapy Diagnosis:   Encounter Diagnoses   Name Primary?    Decreased ROM of right knee Yes    Status post unicompartmental knee replacement, right     Decreased strength of lower extremity      Physician: Dawood Jay MD    Visit Date: 7/10/2023    Physician Orders: PT Eval and Treat    Medical Diagnosis from Referral: right unicompartmental knee replacement   Evaluation Date: 4/13/2023  Updated Plan of Care Due : 6/17/2023  Authorization Period Expiration: [employer] 7/16/23  Plan of Care Expiration: [employer] approved 20 visits plus eval thru 7/16  Visit # / Visits authorized: 18/ 30  PTA Visit #: 3    Time In: 1015  Time Out: 1100  Total Billable Time: 45 minutes   Ortho 6/28/23    Precautions: Standard    Subjective     Pt reports: My left knee is really bothering me. I see neurologist marv, I'm having trouble walking and getting up and down.  She was compliant with home exercise program.  Response to previous treatment: sore  Functional change: none noted    Pain: 0/10  Location: right knee      Objective     Kailee received therapeutic exercises to develop strength, endurance, ROM, flexibility, and posture for 30 minutes including:    Bike x 6 minutes   Sit to stand x 10  Slant board for bilateral calf stretch x 2 minutes   Double support squats total gym x 20  Double support calf raises total gym x 20  Right single support leg presses 20 x 55#  Double support calf presses 15 x 55#  Bilateral hip flexion on cybex 10 x 20#  Swissball bridging x 10  Swissball knee flexion x 20 repetitions   Right sitting hamstrings stretch x 5     Range of motion right knee:   Flexion            126 degrees   Extension  0 degrees   Quad lag w/tke -3 degrees     Kailee participated in neuromuscular re-education activities to improve: Posture for 15 minutes. The following activities were included:  Quad set x  20  Terminal knee extension 20 x 5#  Slant board x 2'    Home Exercises Provided and Patient Education Provided     Education provided: continue home exercise program    Written Home Exercises Provided: Patient instructed to cont prior HEP.  Exercises were reviewed and Kailee was able to demonstrate them prior to the end of the session.  Kailee demonstrated good  understanding of the education provided.     Assessment   Pt instructed to include sit to stand with home exercise program and hip flexion when exercising in pool  Kailee Is progressing well towards her goals.   Pt prognosis is Excellent.     Pt will continue to benefit from skilled outpatient physical therapy to address the deficits listed in the problem list box on initial evaluation, provide pt/family education and to maximize pt's level of independence in the home and community environment.     Anticipated barriers to physical therapy: compliance with home exercise program     Goals:  Short Term Goals: 4 weeks   Pt will be independent with home ex program MET  Pt will be able to wean from walker to independent MET  Pt will be able to increase range of motion 0-125- met   Pt will be able to increase quad lag from -25 to 0 degrees   Return to driving -met  Increase strength to 3+/ 5     Long Term Goals: 6 weeks   Pt will ambulate without a limp   Be able to stoop and bend pain free  Be able to return to yardwork.-met    Plan     Plan of care Certification: 4/13/2023 to 6/13/2023.     Outpatient Physical Therapy 2 times weekly for 8 weeks to include the following interventions: Electrical Stimulation nmes , Neuromuscular Re-ed, and Therapeutic Exercise.     Plan of care has been reestablished with Carol Peña LPTA, Esther Guajardo LPTA, Cherelle Austin LPTA  and Briana Mahmood LPTA.     Cherelle Austin, PTA  7/10/2023

## 2023-07-12 ENCOUNTER — CLINICAL SUPPORT (OUTPATIENT)
Dept: REHABILITATION | Facility: HOSPITAL | Age: 78
End: 2023-07-12
Payer: MEDICARE

## 2023-07-12 DIAGNOSIS — M25.661 DECREASED ROM OF RIGHT KNEE: Primary | ICD-10-CM

## 2023-07-12 DIAGNOSIS — Z96.651 STATUS POST UNICOMPARTMENTAL KNEE REPLACEMENT, RIGHT: ICD-10-CM

## 2023-07-12 DIAGNOSIS — R29.898 DECREASED STRENGTH OF LOWER EXTREMITY: ICD-10-CM

## 2023-07-12 PROCEDURE — 97112 NEUROMUSCULAR REEDUCATION: CPT | Mod: PN,CQ

## 2023-07-12 PROCEDURE — 97110 THERAPEUTIC EXERCISES: CPT | Mod: PN,CQ

## 2023-07-12 NOTE — PROGRESS NOTES
Physical Therapy Treatment Note     Name: Kailee Malone  Clinic Number: [id number]    Therapy Diagnosis:   Encounter Diagnoses   Name Primary?    Decreased ROM of right knee Yes    Status post unicompartmental knee replacement, right     Decreased strength of lower extremity      Physician: Dawood Jay MD    Visit Date: 7/12/2023    Physician Orders: PT Eval and Treat    Medical Diagnosis from Referral: right unicompartmental knee replacement   Evaluation Date: 4/13/2023  Updated Plan of Care Due : 6/17/2023  Authorization Period Expiration: [employer] 7/16/23  Plan of Care Expiration: [employer] approved 20 visits plus eval thru 7/16  Visit # / Visits authorized: 20/ 34  PTA Visit #: 5    Time In: 1014  Time Out: 1058  Total Billable Time: 44 minutes   Ortho 6/28/23    Precautions: Standard    Subjective     Pt reports:  R ankle swelling is improved since last visit but not completely down. Pt. States her balance is still not great and reports fear of falling again at home.   She was compliant with home exercise program.  Response to previous treatment: sore  Functional change: none noted    Pain: 0/10  Location: right knee      Objective     Kailee received therapeutic exercises to develop strength, endurance, ROM, flexibility, and posture for 30 minutes including:    Bike x 6 minutes   Sit to stand x 10  Slant board for bilateral calf stretch x 2 minutes   Double support squats total gym x 20  Double support calf raises total gym x 20  Double support leg presses 20 x 60#  Double support calf presses 15 x 60#  Bilateral hip flexion on cybex 15 x 20#  Swissball bridging x 10  Swissball knee flexion x 20 repetitions   Right sitting hamstrings stretch 3 x 30 seconds     Range of motion right knee:   Flexion            126 degrees   Extension  0 degrees   Quad lag w/tke -2 degrees     Kailee participated in neuromuscular re-education activities to improve: Posture for 15 minutes. The following activities were  included:  Single leg Stance on Blue Foam x 10 reps to LOB with CGA  Side Step Wide legs squats x 20 feet each to left and right with CGA.   Tandem Gait FWD and BCK x 20 feet each with bilateral HHA  Terminal knee extension 20 x 5# (not today)  Slant board x 2'    Home Exercises Provided and Patient Education Provided     Education provided: continue home exercise program    Written Home Exercises Provided: Patient instructed to cont prior HEP.  Exercises were reviewed and Kailee was able to demonstrate them prior to the end of the session.  Kailee demonstrated good  understanding of the education provided.     Assessment   Pt instructed to include sit to stand with home exercise program and hip flexion when exercising in pool  Kailee Is progressing well towards her goals.   Pt prognosis is Excellent.     Pt will continue to benefit from skilled outpatient physical therapy to address the deficits listed in the problem list box on initial evaluation, provide pt/family education and to maximize pt's level of independence in the home and community environment.     Anticipated barriers to physical therapy: compliance with home exercise program     Goals:  Short Term Goals: 4 weeks   Pt will be independent with home ex program MET  Pt will be able to wean from walker to independent MET  Pt will be able to increase range of motion 0-125- met   Pt will be able to increase quad lag from -25 to 0 degrees   Return to driving -met  Increase strength to 3+/ 5     Long Term Goals: 6 weeks   Pt will ambulate without a limp   Be able to stoop and bend pain free  Be able to return to yardwork.-met    Plan     Plan of care Certification: 4/13/2023 to 6/13/2023.     Outpatient Physical Therapy 2 times weekly for 8 weeks to include the following interventions: Electrical Stimulation nmes , Neuromuscular Re-ed, and Therapeutic Exercise.     Plan of care has been reestablished with Carol HENSON, Esther HENSON, Cherelle Austin  LPTA  and Briana Mahmood TA.     Cherelle Austin, PTA  7/12/2023

## 2023-07-12 NOTE — PLAN OF CARE
Physical Therapy Treatment Note      Name: Kailee Malone  Clinic Number: [id number]     Therapy Diagnosis:        Encounter Diagnoses   Name Primary?    Decreased ROM of right knee Yes    Status post unicompartmental knee replacement, right      Decreased strength of lower extremity        Physician: Dawood Jay MD     Visit Date: 7/10/2023     Physician Orders: PT Eval and Treat    Medical Diagnosis from Referral: right unicompartmental knee replacement   Evaluation Date: 4/13/2023  Updated Plan of Care Due : 6/17/2023  Authorization Period Expiration: humana 7/16/23  Plan of Care Expiration: humana approved 20 visits plus eval thru 7/16  Visit # / Visits authorized: 18/ 30  PTA Visit #: 3     Time In: 1015  Time Out: 1100  Total Billable Time: 45 minutes   Ortho 6/28/23     Precautions: Standard     Subjective      Pt reports: My left knee is really bothering me. I see neurologist marv, I'm having trouble walking and getting up and down.  She was compliant with home exercise program.  Response to previous treatment: sore  Functional change: none noted     Pain: 0/10  Location: right knee       Objective      Kailee received therapeutic exercises to develop strength, endurance, ROM, flexibility, and posture for 30 minutes including:     Bike x 6 minutes   Sit to stand x 10  Slant board for bilateral calf stretch x 2 minutes   Double support squats total gym x 20  Double support calf raises total gym x 20  Right single support leg presses 20 x 55#  Double support calf presses 15 x 55#  Bilateral hip flexion on cybex 10 x 20#  Swissball bridging x 10  Swissball knee flexion x 20 repetitions   Right sitting hamstrings stretch x 5      Range of motion right knee:   Flexion            126 degrees   Extension        0 degrees   Quad lag w/tke -3 degrees      Kailee participated in neuromuscular re-education activities to improve: Posture for 15 minutes. The following activities were included:  Quad set x  20  Terminal knee extension 20 x 5#  Slant board x 2'     Home Exercises Provided and Patient Education Provided      Education provided: continue home exercise program     Written Home Exercises Provided: Patient instructed to cont prior HEP.  Exercises were reviewed and Kailee was able to demonstrate them prior to the end of the session.  Kailee demonstrated good  understanding of the education provided.      Assessment   Pt instructed to include sit to stand with home exercise program and hip flexion when exercising in pool.  Pt voices she still has trouble with balance on her right knee .    Kailee Is progressing well towards her goals.   Pt prognosis is Excellent.      Pt will continue to benefit from skilled outpatient physical therapy to address the deficits listed in the problem list box on initial evaluation, provide pt/family education and to maximize pt's level of independence in the home and community environment.      Anticipated barriers to physical therapy: compliance with home exercise program      Goals:  Short Term Goals: 4 weeks   Pt will be independent with home ex program MET  Pt will be able to wean from walker to independent MET  Pt will be able to increase range of motion 0-125- met   Pt will be able to increase quad lag from -25 to 0 degrees   Return to driving -met  Increase strength to 3+/ 5     Long Term Goals: 6 weeks   Pt will ambulate without a limp   Be able to stoop and bend pain free  Be able to return to yardwork.-met     Plan       Reasons for Recertification of Therapy: cont PT    Plan     Updated Certification Period: 7/12/2023 to 8/12/2023  Recommended Treatment Plan: 2 times per week for 4 weeks: Neuromuscular Re-ed, Patient Education, and Therapeutic Exercise  Other Recommendations: cont PT    Chase Martinez, PT  7/12/2023      I CERTIFY THE NEED FOR THESE SERVICES FURNISHED UNDER THIS PLAN OF TREATMENT AND WHILE UNDER MY CARE.    Physician's comments:      Physician's  Signature: ___________________________________________________

## 2023-07-18 ENCOUNTER — CLINICAL SUPPORT (OUTPATIENT)
Dept: REHABILITATION | Facility: HOSPITAL | Age: 78
End: 2023-07-18
Payer: MEDICARE

## 2023-07-18 DIAGNOSIS — R29.898 DECREASED STRENGTH OF LOWER EXTREMITY: ICD-10-CM

## 2023-07-18 DIAGNOSIS — Z96.651 STATUS POST UNICOMPARTMENTAL KNEE REPLACEMENT, RIGHT: ICD-10-CM

## 2023-07-18 DIAGNOSIS — M25.661 DECREASED ROM OF RIGHT KNEE: Primary | ICD-10-CM

## 2023-07-18 PROCEDURE — 97112 NEUROMUSCULAR REEDUCATION: CPT | Mod: PN

## 2023-07-18 PROCEDURE — 97110 THERAPEUTIC EXERCISES: CPT | Mod: PN,CQ

## 2023-07-18 NOTE — PROGRESS NOTES
Plan of care has been reestablished with Carol Peña LPTA, Esther Guajardo LPTA, Cherelle Austin LPTA  and Briana Mahmood LPTA.      Pt performed neuro muscular re ed.  15 min     Quad sets with estim x 20 reps   Terminal knee extension with estim x 20 reps   Straight leg raise x 20 reps with etim x 20 reps     Chase Martinez, PT

## 2023-07-18 NOTE — PROGRESS NOTES
Physical Therapy Treatment Note     Name: Kailee Malone  Clinic Number: [id number]    Therapy Diagnosis:   Encounter Diagnoses   Name Primary?    Decreased ROM of right knee Yes    Status post unicompartmental knee replacement, right     Decreased strength of lower extremity      Physician: Dawood Jay MD    Visit Date: 7/18/2023    Physician Orders: PT Eval and Treat    Medical Diagnosis from Referral: right unicompartmental knee replacement   Evaluation Date: 4/13/2023  Updated Plan of Care Due : 8/11/23  Authorization Period Expiration: humana 7/16/23  Plan of Care Expiration: humana approved 34 visits plus eval thru 10/10  Visit # / Visits authorized: 21/ 34  PTA Visit #: 6    Time In: 755  Time Out: 825  Total Billable Time: 30 minutes   Ortho     Precautions: Standard    Subjective     Pt reports:  my left knee is bothering me more than right, my knee has been swelling more since last time a fell a few weeks ago. I still cant stand up without pushing up from chair or grabbing a hold of something.  She was compliant with home exercise program.  Response to previous treatment: sore  Functional change: none noted    Pain: 2/10  Location: right knee      Objective     Kailee received therapeutic exercises to develop strength, endurance, ROM, flexibility, and posture for 30 minutes including:    Bike x 6 minutes   Slant board for bilateral calf stretch x 2 minutes   Double support squats total gym x 20  Double support calf raises total gym x 20  Right single support leg presses 20 x 50#  Double support calf presses 15 x 60#  Bilateral hip flexion on cybex 15 x 20#   bridging x 10  Bilateral straight leg raise  x 10  Swissball knee flexion x 20 repetitions   Right sitting hamstrings stretch 3 x 30 seconds     Range of motion right knee:   Flexion            126 degrees   Extension  +2 degrees   Quad lag w/tke -  5 degrees     Kailee participated in neuromuscular re-education activities to improve: Posture  for 0 minutes. The following activities were included:  See physical therapist note for neuro-ed      Home Exercises Provided and Patient Education Provided     Education provided: continue home exercise program    Written Home Exercises Provided: Patient instructed to cont prior HEP.  Exercises were reviewed and Kailee was able to demonstrate them prior to the end of the session.  Kailee demonstrated good  understanding of the education provided.     Assessment   Pt received co-treatment with physical therapist  Kailee Is progressing well towards her goals.   Pt prognosis is Excellent.     Pt will continue to benefit from skilled outpatient physical therapy to address the deficits listed in the problem list box on initial evaluation, provide pt/family education and to maximize pt's level of independence in the home and community environment.     Anticipated barriers to physical therapy: compliance with home exercise program     Goals:  Short Term Goals: 4 weeks   Pt will be independent with home ex program MET  Pt will be able to wean from walker to independent MET  Pt will be able to increase range of motion 0-125- met   Pt will be able to increase quad lag from -25 to 0 degrees   Return to driving -met  Increase strength to 3+/ 5     Long Term Goals: 6 weeks   Pt will ambulate without a limp   Be able to stoop and bend pain free  Be able to return to yardwork.-met    Plan     Plan of care Certification: 7/12/23-8/12/23     Outpatient Physical Therapy 2 times weekly for 4 weeks to include the following interventions: Electrical Stimulation nmes , Neuromuscular Re-ed, and Therapeutic Exercise.     Plan of care has been reestablished with Carol Peña LPTA, Esther Guajardo LPTA, Cherelle Austin LPTA  and Briana Mahmood LPTA.     Lucero Peña, PTA  7/18/2023

## 2023-07-25 ENCOUNTER — CLINICAL SUPPORT (OUTPATIENT)
Dept: REHABILITATION | Facility: HOSPITAL | Age: 78
End: 2023-07-25
Payer: MEDICARE

## 2023-07-25 DIAGNOSIS — R29.898 DECREASED STRENGTH OF LOWER EXTREMITY: ICD-10-CM

## 2023-07-25 DIAGNOSIS — M25.661 DECREASED ROM OF RIGHT KNEE: Primary | ICD-10-CM

## 2023-07-25 DIAGNOSIS — Z96.651 STATUS POST UNICOMPARTMENTAL KNEE REPLACEMENT, RIGHT: ICD-10-CM

## 2023-07-25 PROCEDURE — 97110 THERAPEUTIC EXERCISES: CPT | Mod: PN,CQ

## 2023-07-25 PROCEDURE — 97112 NEUROMUSCULAR REEDUCATION: CPT | Mod: PN,CQ

## 2023-07-25 NOTE — PROGRESS NOTES
Physical Therapy Treatment Note     Name: Kailee Malone  Clinic Number: [id number]    Therapy Diagnosis:   Encounter Diagnoses   Name Primary?    Decreased ROM of right knee Yes    Status post unicompartmental knee replacement, right     Decreased strength of lower extremity      Physician: Dawood Jay MD    Visit Date: 7/25/2023    Physician Orders: PT Eval and Treat    Medical Diagnosis from Referral: right unicompartmental knee replacement   Evaluation Date: 4/13/2023  Updated Plan of Care Due : 8/11/23  Authorization Period Expiration: humana 10/10/23  Plan of Care Expiration: humana approved 34 visits plus eval thru 10/10  Visit # / Visits authorized: 22/ 34  PTA Visit #: 1    Time In: 1015  Time Out: 1055  Total Billable Time: 30 minutes   Ortho     Precautions: Standard    Subjective     Pt reports: I  got injection last week in left knee and the ankle swelling is better. I can get up and down much better. I'm having TOTAL KNEE REPLACEMENT in September.  She was compliant with home exercise program.  Response to previous treatment: sore  Functional change: none noted    Pain: 2/10  Location: right knee      Objective     Kailee received therapeutic exercises to develop strength, endurance, ROM, flexibility, and posture for 30 minutes including:    Bike x 5 minutes   Slant board for bilateral calf stretch x 2 minutes   Double support squats total gym x 20  Double support calf raises total gym x 20  Right single support leg presses 20 x 55#  Double support calf presses 15 x 60#  Right straight leg raise  x 10  Swissball knee flexion x 20 repetitions       Range of motion right knee:   Flexion            130 degrees   Extension  +3 degrees   Quad lag w/tke - 3 degrees     Kailee participated in neuromuscular re-education activities to improve: Posture for  10 minutes. The following activities were included:  Quad set with electrical stimulation x 20  Terminal knee extension with electrical stimulation  20 x 5#      Home Exercises Provided and Patient Education Provided     Education provided: continue home exercise program    Written Home Exercises Provided: Patient instructed to cont prior HEP.  Exercises were reviewed and Kailee was able to demonstrate them prior to the end of the session.  Kailee demonstrated good  understanding of the education provided.     Assessment   Pt able to transition from one position to another with less pain this treatment, progressing with quad lag with terminal knee extension   Kailee Is progressing well towards her goals.   Pt prognosis is Excellent.     Pt will continue to benefit from skilled outpatient physical therapy to address the deficits listed in the problem list box on initial evaluation, provide pt/family education and to maximize pt's level of independence in the home and community environment.     Anticipated barriers to physical therapy: compliance with home exercise program     Goals:  Short Term Goals: 4 weeks   Pt will be independent with home ex program MET  Pt will be able to wean from walker to independent MET  Pt will be able to increase range of motion 0-125- met   Pt will be able to increase quad lag from -25 to 0 degrees   Return to driving -met  Increase strength to 3+/ 5     Long Term Goals: 6 weeks   Pt will ambulate without a limp   Be able to stoop and bend pain free  Be able to return to yardwork.-met    Plan     Plan of care Certification: 7/12/23-8/12/23     Outpatient Physical Therapy 2 times weekly for 4 weeks to include the following interventions: Electrical Stimulation nmes , Neuromuscular Re-ed, and Therapeutic Exercise.     Plan of care has been reestablished with Carol HENSON, Esther Guajardo LPTA, Cherelle Austin LPTA  and Briana Mahmood LPTA.     Lucero Peña, PTA  7/25/2023     Plan of care has been reestablished with Carol HENSON, Esther Guajardo LPTA, Cherelle Austin LPTA  and Briana [place] LPTA.      Pt performed neuro muscular re  ed.  15 min     Quad sets with estim x 20 reps   Terminal knee extension with estim x 20 reps   Straight leg raise x 20 reps with etim x 20 reps     Lucero Peña, PTA

## 2023-07-27 ENCOUNTER — CLINICAL SUPPORT (OUTPATIENT)
Dept: REHABILITATION | Facility: HOSPITAL | Age: 78
End: 2023-07-27
Payer: MEDICARE

## 2023-07-27 ENCOUNTER — OFFICE VISIT (OUTPATIENT)
Dept: FAMILY MEDICINE | Facility: CLINIC | Age: 78
End: 2023-07-27
Payer: MEDICARE

## 2023-07-27 VITALS
RESPIRATION RATE: 18 BRPM | TEMPERATURE: 97 F | HEIGHT: 65 IN | WEIGHT: 184.81 LBS | HEART RATE: 63 BPM | OXYGEN SATURATION: 99 % | BODY MASS INDEX: 30.79 KG/M2 | SYSTOLIC BLOOD PRESSURE: 120 MMHG | DIASTOLIC BLOOD PRESSURE: 76 MMHG

## 2023-07-27 DIAGNOSIS — Z96.651 STATUS POST UNICOMPARTMENTAL KNEE REPLACEMENT, RIGHT: ICD-10-CM

## 2023-07-27 DIAGNOSIS — R29.898 DECREASED STRENGTH OF LOWER EXTREMITY: ICD-10-CM

## 2023-07-27 DIAGNOSIS — N30.00 ACUTE CYSTITIS WITHOUT HEMATURIA: Primary | ICD-10-CM

## 2023-07-27 DIAGNOSIS — M25.661 DECREASED ROM OF RIGHT KNEE: Primary | ICD-10-CM

## 2023-07-27 LAB
BILIRUB SERPL-MCNC: NEGATIVE MG/DL
BLOOD URINE, POC: NEGATIVE
COLOR, POC UA: YELLOW
GLUCOSE UR QL STRIP: NEGATIVE
KETONES UR QL STRIP: NEGATIVE
LEUKOCYTE ESTERASE URINE, POC: ABNORMAL
NITRITE, POC UA: NEGATIVE
PH, POC UA: 8.5
PROTEIN, POC: NEGATIVE
SPECIFIC GRAVITY, POC UA: 1.01
UROBILINOGEN, POC UA: 0.2

## 2023-07-27 PROCEDURE — 1101F PT FALLS ASSESS-DOCD LE1/YR: CPT | Mod: ,,, | Performed by: STUDENT IN AN ORGANIZED HEALTH CARE EDUCATION/TRAINING PROGRAM

## 2023-07-27 PROCEDURE — 99213 OFFICE O/P EST LOW 20 MIN: CPT | Mod: ,,, | Performed by: STUDENT IN AN ORGANIZED HEALTH CARE EDUCATION/TRAINING PROGRAM

## 2023-07-27 PROCEDURE — 1159F MED LIST DOCD IN RCRD: CPT | Mod: ,,, | Performed by: STUDENT IN AN ORGANIZED HEALTH CARE EDUCATION/TRAINING PROGRAM

## 2023-07-27 PROCEDURE — 1101F PR PT FALLS ASSESS DOC 0-1 FALLS W/OUT INJ PAST YR: ICD-10-PCS | Mod: ,,, | Performed by: STUDENT IN AN ORGANIZED HEALTH CARE EDUCATION/TRAINING PROGRAM

## 2023-07-27 PROCEDURE — 87186 CULTURE, URINE: ICD-10-PCS | Mod: ,,, | Performed by: CLINICAL MEDICAL LABORATORY

## 2023-07-27 PROCEDURE — 87077 CULTURE AEROBIC IDENTIFY: CPT | Mod: ,,, | Performed by: CLINICAL MEDICAL LABORATORY

## 2023-07-27 PROCEDURE — 3078F PR MOST RECENT DIASTOLIC BLOOD PRESSURE < 80 MM HG: ICD-10-PCS | Mod: ,,, | Performed by: STUDENT IN AN ORGANIZED HEALTH CARE EDUCATION/TRAINING PROGRAM

## 2023-07-27 PROCEDURE — 99213 PR OFFICE/OUTPT VISIT, EST, LEVL III, 20-29 MIN: ICD-10-PCS | Mod: ,,, | Performed by: STUDENT IN AN ORGANIZED HEALTH CARE EDUCATION/TRAINING PROGRAM

## 2023-07-27 PROCEDURE — 3074F PR MOST RECENT SYSTOLIC BLOOD PRESSURE < 130 MM HG: ICD-10-PCS | Mod: ,,, | Performed by: STUDENT IN AN ORGANIZED HEALTH CARE EDUCATION/TRAINING PROGRAM

## 2023-07-27 PROCEDURE — 87086 CULTURE, URINE: ICD-10-PCS | Mod: ,,, | Performed by: CLINICAL MEDICAL LABORATORY

## 2023-07-27 PROCEDURE — 97110 THERAPEUTIC EXERCISES: CPT | Mod: PN

## 2023-07-27 PROCEDURE — 87086 URINE CULTURE/COLONY COUNT: CPT | Mod: ,,, | Performed by: CLINICAL MEDICAL LABORATORY

## 2023-07-27 PROCEDURE — 81003 POCT URINALYSIS W/O SCOPE: ICD-10-PCS | Mod: QW,,, | Performed by: STUDENT IN AN ORGANIZED HEALTH CARE EDUCATION/TRAINING PROGRAM

## 2023-07-27 PROCEDURE — 87077 CULTURE, URINE: ICD-10-PCS | Mod: ,,, | Performed by: CLINICAL MEDICAL LABORATORY

## 2023-07-27 PROCEDURE — 81003 URINALYSIS AUTO W/O SCOPE: CPT | Mod: QW,,, | Performed by: STUDENT IN AN ORGANIZED HEALTH CARE EDUCATION/TRAINING PROGRAM

## 2023-07-27 PROCEDURE — 3078F DIAST BP <80 MM HG: CPT | Mod: ,,, | Performed by: STUDENT IN AN ORGANIZED HEALTH CARE EDUCATION/TRAINING PROGRAM

## 2023-07-27 PROCEDURE — 1159F PR MEDICATION LIST DOCUMENTED IN MEDICAL RECORD: ICD-10-PCS | Mod: ,,, | Performed by: STUDENT IN AN ORGANIZED HEALTH CARE EDUCATION/TRAINING PROGRAM

## 2023-07-27 PROCEDURE — 3288F PR FALLS RISK ASSESSMENT DOCUMENTED: ICD-10-PCS | Mod: ,,, | Performed by: STUDENT IN AN ORGANIZED HEALTH CARE EDUCATION/TRAINING PROGRAM

## 2023-07-27 PROCEDURE — 97112 NEUROMUSCULAR REEDUCATION: CPT | Mod: PN

## 2023-07-27 PROCEDURE — 87186 SC STD MICRODIL/AGAR DIL: CPT | Mod: ,,, | Performed by: CLINICAL MEDICAL LABORATORY

## 2023-07-27 PROCEDURE — 3074F SYST BP LT 130 MM HG: CPT | Mod: ,,, | Performed by: STUDENT IN AN ORGANIZED HEALTH CARE EDUCATION/TRAINING PROGRAM

## 2023-07-27 PROCEDURE — 3288F FALL RISK ASSESSMENT DOCD: CPT | Mod: ,,, | Performed by: STUDENT IN AN ORGANIZED HEALTH CARE EDUCATION/TRAINING PROGRAM

## 2023-07-27 RX ORDER — SULFAMETHOXAZOLE AND TRIMETHOPRIM 800; 160 MG/1; MG/1
1 TABLET ORAL 2 TIMES DAILY
Qty: 6 TABLET | Refills: 0 | Status: SHIPPED | OUTPATIENT
Start: 2023-07-27 | End: 2023-07-30

## 2023-07-27 RX ORDER — HYDROXYZINE HYDROCHLORIDE 25 MG/1
25 TABLET, FILM COATED ORAL EVERY 6 HOURS PRN
COMMUNITY
Start: 2023-07-10

## 2023-07-27 NOTE — PROGRESS NOTES
Progress Note     ALEKS DE JESUS MD   [address]  [place] [address]     PATIENT NAME: Kailee Malone  : 1945  DATE: 23  MRN: [id number]      Billing Provider: ALEKS DE JESUS MD  Level of Service:   Patient PCP Information       Provider PCP Type    Caden Bradford MD General                Urinary Tract Infection (Symptoms started X3days ago/No itching/Pt states she has been burning/Pt states she been feeling tired and dizzy/Pt stated she just left physical therapy)      SUBJECTIVE:     Kailee Malone is a 78 y.o.female who presents to clinic for Urinary Tract Infection (Symptoms started X3days ago/No itching/Pt states she has been burning/Pt states she been feeling tired and dizzy/Pt stated she just left physical therapy)    Patient notes onset of symptoms approximately 3 days ago.  She is having burning with urination.  She denies any nausea, vomiting, abdominal pain, low back pain, or flank pain.  She notes that at times she does not wipe from front to back.  She showers and does not bathe.  She denies any difficulty with constipation.  Of note, she has a history of iodine allergy.  She has tolerated Bactrim in the past without difficulty.  The last urine culture that was positive showed Klebsiella which was sensitive to Bactrim.        Past Medical History:  has a past medical history of Acute gastric ulcer without hemorrhage or perforation (2022), Acute superficial gastritis without hemorrhage (2022), Kirkpatrick's esophagus without dysplasia (2022), Diverticula, colon (10/04/2021), HH (hiatus hernia) (2022), History of colon polyps (10/04/2021), Hypertension, and Screening for colon cancer (10/04/2021).   Past Surgical History:  has a past surgical history that includes RT WRIST; Hysterectomy; Breast surgery; Arthroscopy of knee (Right, 2021); Knee arthroscopy w/ meniscectomy (Right, 2021); Breast biopsy; and  Oophorectomy.  Family History: family history includes Breast cancer in her maternal aunt and mother.  Social History:  reports that she has never smoked. She has never used smokeless tobacco. She reports that she does not drink alcohol and does not use drugs.  Allergies:   Review of patient's allergies indicates:   Allergen Reactions    Betadine prepstick [povidone-iodine-ethyl alcohol]     Penicillins     Povidone-iodine Other (See Comments)         Current Outpatient Medications:     alendronate (FOSAMAX) 70 MG tablet, Take 70 mg by mouth every 7 days., Disp: , Rfl:     colestipoL (COLESTID) 1 gram Tab, Take 1 tablet (1 g total) by mouth 2 (two) times daily., Disp: 180 tablet, Rfl: 1    ergocalciferol (ERGOCALCIFEROL) 50,000 unit Cap, Take 1 capsule (50,000 Units total) by mouth every 30 days. Take one capsule weekly for 12 weeks then reduce to one capsule monthly, Disp: 3 capsule, Rfl: 3    EScitalopram oxalate (LEXAPRO) 20 MG tablet, Take 20 mg by mouth., Disp: , Rfl:     FLUoxetine 20 MG capsule, Take 20 mg by mouth once daily., Disp: , Rfl:     hydrOXYzine HCL (ATARAX) 25 MG tablet, Take 25 mg by mouth every 6 (six) hours as needed., Disp: , Rfl:     ketoconazole (NIZORAL) 2 % cream, APPLY TO AFFECTED AREAS TWICE DAILY UNTIL CLEAR, Disp: , Rfl:     lansoprazole (PREVACID) 30 MG capsule, Take 1 capsule (30 mg total) by mouth once daily., Disp: 90 capsule, Rfl: 3    primidone (MYSOLINE) 50 MG Tab, Take one tablet at bedtime, Disp: 90 tablet, Rfl: 3    propranoloL (INDERAL) 60 MG tablet, Take one tablet daily, monitor heart rate and blood pressure before taking, Disp: 90 tablet, Rfl: 3    triamterene-hydrochlorothiazide 37.5-25 mg (MAXZIDE-25) 37.5-25 mg per tablet, Take 1 tablet by mouth once daily., Disp: , Rfl:     busPIRone (BUSPAR) 5 MG Tab, Take 5 mg by mouth 2 (two) times daily., Disp: , Rfl:     estradioL (ESTRACE) 0.01 % (0.1 mg/gram) vaginal cream, Place 1 g vaginally once daily. (Patient not taking:  "Reported on 1/18/2023), Disp: 42.5 g, Rfl: 6    ketoconazole (NIZORAL) 2 % shampoo, APPLY ON BACK LEAVE ON FOR 5 MINUTES THEN RINSE, 2 TO 3 TIMES WEEKLY, Disp: , Rfl:     predniSONE (DELTASONE) 5 MG tablet, Take 5 mg by mouth., Disp: , Rfl:     sulfamethoxazole-trimethoprim 800-160mg (BACTRIM DS) 800-160 mg Tab, Take 1 tablet by mouth 2 (two) times daily. for 3 days, Disp: 6 tablet, Rfl: 0   OBJECTIVE:     Vital Signs   /76 (BP Location: Left arm, Patient Position: Sitting, BP Method: Large (Manual))   Pulse 63   Temp 97.2 °F (36.2 °C) (Temporal)   Resp 18   Ht 5' 5" (1.651 m)   Wt 83.8 kg (184 lb 12.8 oz)   SpO2 99%   BMI 30.75 kg/m²     Physical Exam  Constitutional:       General: She is not in acute distress.     Appearance: Normal appearance. She is not ill-appearing, toxic-appearing or diaphoretic.   HENT:      Head: Normocephalic and atraumatic.   Eyes:      Extraocular Movements: Extraocular movements intact.      Pupils: Pupils are equal, round, and reactive to light.   Cardiovascular:      Rate and Rhythm: Normal rate and regular rhythm.      Pulses: Normal pulses.      Heart sounds: Normal heart sounds. No murmur heard.    No friction rub. No gallop.   Pulmonary:      Effort: Pulmonary effort is normal. No respiratory distress.      Breath sounds: Normal breath sounds. No wheezing, rhonchi or rales.   Abdominal:      General: Bowel sounds are normal.      Palpations: Abdomen is soft.      Tenderness: There is no abdominal tenderness. There is no right CVA tenderness, left CVA tenderness, guarding or rebound.   Musculoskeletal:         General: Normal range of motion.      Cervical back: Normal range of motion.   Skin:     General: Skin is warm and dry.      Capillary Refill: Capillary refill takes less than 2 seconds.   Neurological:      General: No focal deficit present.      Mental Status: She is alert.   Psychiatric:         Mood and Affect: Mood normal.         Behavior: Behavior normal. "       ASSESSMENT/PLAN:     1. Acute cystitis without hematuria  -     POCT URINALYSIS W/O SCOPE  -     Urine culture  -     sulfamethoxazole-trimethoprim 800-160mg (BACTRIM DS) 800-160 mg Tab; Take 1 tablet by mouth 2 (two) times daily. for 3 days  Dispense: 6 tablet; Refill: 0    Patient's UA is concerning for UTI.  Urine culture ordered.  Patient has tolerated Bactrim in the past.  We will prescribe Bactrim.  Counseled patient on importance of always wiping from front to back. Patient to increase PO water intake. Patient to follow-up if symptoms worsen or fail to improve.    Follow up if symptoms worsen or fail to improve.      ALEKS DE JESUS MD  07/27/2023

## 2023-07-27 NOTE — PROGRESS NOTES
Physical Therapy Treatment Note     Name: Kailee Malone  Clinic Number: [id number]    Therapy Diagnosis:   Encounter Diagnoses   Name Primary?    Decreased ROM of right knee Yes    Status post unicompartmental knee replacement, right     Decreased strength of lower extremity      Physician: Dawood Jay MD    Visit Date: 7/27/2023    Physician Orders: PT Eval and Treat    Medical Diagnosis from Referral: right unicompartmental knee replacement   Evaluation Date: 4/13/2023  Updated Plan of Care Due : 8/11/23  Authorization Period Expiration: humana 10/10/23  Plan of Care Expiration: humana approved 34 visits plus eval thru 10/10  Visit # / Visits authorized: 23/ 34  PTA Visit #:     Time In: 1015  Time Out: 1055  Total Billable Time: 30 minutes   Ortho     Precautions: Standard    Subjective     Pt reports:  I'm having TOTAL KNEE REPLACEMENT in September 5th   She was compliant with home exercise program.  Response to previous treatment: sore  Functional change: none noted    Pain: 2/10  Location: right knee      Objective     Kailee received therapeutic exercises to develop strength, endurance, ROM, flexibility, and posture for 30 minutes including:    Bike x 5 minutes   Slant board for bilateral calf stretch x 2 minutes   Double support squats total gym x 20  Double support calf raises total gym x 20  Right single support leg presses 20 x 55#  Double support calf presses 15 x 60#  Right straight leg raise  x 10  Swissball knee flexion x 20 repetitions       Range of motion right knee:   Flexion            130degrees   Extension  +3 degrees   Quad lag w/tke - 0 degrees     Kailee participated in neuromuscular re-education activities to improve: Posture for  10 minutes. The following activities were included:  Quad set with electrical stimulation x 20  Terminal knee extension with electrical stimulation 20 x 5#  Straight leg raise x 15 reps       Home Exercises Provided and Patient Education Provided      Education provided: continue home exercise program    Written Home Exercises Provided: Patient instructed to cont prior HEP.  Exercises were reviewed and Kailee was able to demonstrate them prior to the end of the session.  Kailee demonstrated good  understanding of the education provided.     Assessment   Pt has no complaints of right knee pain , voices she is ready to get the left replaced .     Kailee Is progressing well towards her goals.   Pt prognosis is Excellent.     Pt will continue to benefit from skilled outpatient physical therapy to address the deficits listed in the problem list box on initial evaluation, provide pt/family education and to maximize pt's level of independence in the home and community environment.     Anticipated barriers to physical therapy: compliance with home exercise program     Goals:  Short Term Goals: 4 weeks   Pt will be independent with home ex program MET  Pt will be able to wean from walker to independent MET  Pt will be able to increase range of motion 0-125- met   Pt will be able to increase quad lag from -25 to 0 degrees   Return to driving -met  Increase strength to 3+/ 5     Long Term Goals: 6 weeks   Pt will ambulate without a limp   Be able to stoop and bend pain free  Be able to return to yardwork.-met    Plan     Plan of care Certification: 7/12/23-8/12/23     Outpatient Physical Therapy 2 times weekly for 4 weeks to include the following interventions: Electrical Stimulation nmes , Neuromuscular Re-ed, and Therapeutic Exercise.     Plan of care has been reestablished with Carol Peña LPTA, Esther Guajardo LPTA, Cherelle [place] LPTA  and Briana Mahmood LPTA.     Chase Martinez, PT  7/27/2023

## 2023-07-29 LAB — UA COMPLETE W REFLEX CULTURE PNL UR: ABNORMAL

## 2023-07-31 NOTE — PROGRESS NOTES
Please call and let patient know that her urine did grow e. Coli sensitive to bactrim. Please ensure her symptoms have resolved.

## 2023-08-01 ENCOUNTER — CLINICAL SUPPORT (OUTPATIENT)
Dept: REHABILITATION | Facility: HOSPITAL | Age: 78
End: 2023-08-01
Payer: MEDICARE

## 2023-08-01 DIAGNOSIS — Z96.651 STATUS POST UNICOMPARTMENTAL KNEE REPLACEMENT, RIGHT: ICD-10-CM

## 2023-08-01 DIAGNOSIS — R29.898 DECREASED STRENGTH OF LOWER EXTREMITY: ICD-10-CM

## 2023-08-01 DIAGNOSIS — M25.661 DECREASED ROM OF RIGHT KNEE: Primary | ICD-10-CM

## 2023-08-01 PROCEDURE — 97110 THERAPEUTIC EXERCISES: CPT | Mod: PN,CQ

## 2023-08-01 PROCEDURE — 97112 NEUROMUSCULAR REEDUCATION: CPT | Mod: PN,CQ

## 2023-08-01 NOTE — PROGRESS NOTES
"  Physical Therapy Treatment Note     Name: Kailee Malone  Clinic Number: [id number]    Therapy Diagnosis:   Encounter Diagnoses   Name Primary?    Decreased ROM of right knee Yes    Status post unicompartmental knee replacement, right     Decreased strength of lower extremity      Physician: Dawood Jay MD    Visit Date: 8/1/2023    Physician Orders: PT Eval and Treat    Medical Diagnosis from Referral: right unicompartmental knee replacement   Evaluation Date: 4/13/2023  Updated Plan of Care Due : 8/11/23  Authorization Period Expiration: human[employer] 10/10/23  Plan of Care Expiration: human[employer] approved 34 visits plus eval thru 10/10  Visit # / Visits authorized: 24/ 34  PTA Visit #: 1    Time In: 1015  Time Out: 1055  Total Billable Time: 40 minutes   Ortho     Precautions: Standard    Subjective     Pt reports:  my  right knee started  feeling 'weird' over the weekend  She was compliant with home exercise program.  Response to previous treatment: sore  Functional change: none noted    Pain: 2/10  Location: right knee      Objective     Kailee received therapeutic exercises to develop strength, endurance, ROM, flexibility, and posture for 30 minutes including:    Bike x 5 minutes  intensity 2   Slant board for bilateral calf stretch x 2 minutes   Double support squats total gym x 20  Double support calf raises total gym x 20  Right single support leg presses 20 x 55#  Double support calf presses 15 x 60#  Right straight leg raise  x 10  Swissball knee flexion x 20 repetitions       Range of motion right knee:   Flexion            128 degrees   Extension  +4 degrees   Quad lag w/tke - 0 degrees     Kailee participated in neuromuscular re-education activities to improve: Posture for 10 minutes. The following activities were included:  Quad set with electrical stimulation x 20  Terminal knee extension with electrical stimulation 20 x 5#  Straight leg raise x 15 reps   Right lateral step downs 6" x 15    Home " Exercises Provided and Patient Education Provided     Education provided: continue home exercise program    Written Home Exercises Provided: Patient instructed to cont prior HEP.  Exercises were reviewed and Kailee was able to demonstrate them prior to the end of the session.  Kailee demonstrated good  understanding of the education provided.     Assessment   Pt progressing with lateral stability.    Kailee Is progressing well towards her goals.   Pt prognosis is Excellent.     Pt will continue to benefit from skilled outpatient physical therapy to address the deficits listed in the problem list box on initial evaluation, provide pt/family education and to maximize pt's level of independence in the home and community environment.     Anticipated barriers to physical therapy: compliance with home exercise program     Goals:  Short Term Goals: 4 weeks   Pt will be independent with home ex program MET  Pt will be able to wean from walker to independent MET  Pt will be able to increase range of motion 0-125- met   Pt will be able to increase quad lag from -25 to 0 degrees   Return to driving -met  Increase strength to 3+/ 5     Long Term Goals: 6 weeks   Pt will ambulate without a limp   Be able to stoop and bend pain free  Be able to return to yardwork.-met    Plan     Plan of care Certification: 7/12/23-8/12/23     Outpatient Physical Therapy 2 times weekly for 4 weeks to include the following interventions: Electrical Stimulation nmes , Neuromuscular Re-ed, and Therapeutic Exercise.     Plan of care has been reestablished with Carol HENSON, Esther HENSON, Cherelle HENSON  and Briana HENSON.     Lucero Peña, PTA  8/1/2023

## 2023-08-04 ENCOUNTER — DOCUMENTATION ONLY (OUTPATIENT)
Dept: REHABILITATION | Facility: HOSPITAL | Age: 78
End: 2023-08-04
Payer: MEDICARE

## 2023-08-15 ENCOUNTER — CLINICAL SUPPORT (OUTPATIENT)
Dept: REHABILITATION | Facility: HOSPITAL | Age: 78
End: 2023-08-15
Payer: MEDICARE

## 2023-08-15 DIAGNOSIS — Z96.651 STATUS POST UNICOMPARTMENTAL KNEE REPLACEMENT, RIGHT: ICD-10-CM

## 2023-08-15 DIAGNOSIS — M25.661 DECREASED ROM OF RIGHT KNEE: Primary | ICD-10-CM

## 2023-08-15 DIAGNOSIS — R29.898 DECREASED STRENGTH OF LOWER EXTREMITY: ICD-10-CM

## 2023-08-15 PROCEDURE — 97112 NEUROMUSCULAR REEDUCATION: CPT | Mod: PN,CQ

## 2023-08-15 PROCEDURE — 97110 THERAPEUTIC EXERCISES: CPT | Mod: PN,CQ

## 2023-08-15 NOTE — PROGRESS NOTES
Physical Therapy Treatment Note     Name: Kailee Malone  Clinic Number: [id number]    Therapy Diagnosis:   Encounter Diagnoses   Name Primary?    Decreased ROM of right knee Yes    Status post unicompartmental knee replacement, right     Decreased strength of lower extremity      Physician: No ref. provider found    Visit Date: 8/15/2023    Physician Orders: PT Eval and Treat    Medical Diagnosis from Referral: right unicompartmental knee replacement   Evaluation Date: 4/13/2023  Updated Plan of Care Due :  10/10/2023   Authorization Period Expiration: humana 10/10/23  Plan of Care Expiration: human[employer] approved 34 visits plus eval thru 10/10  Visit # / Visits authorized: 25/ 34  PTA Visit #: 2    Time In: 845  Time Out: 930  Total Billable Time: 45 minutes   Ortho     Precautions: Standard    Subjective     Pt reports: I didn't have anything broken from my fall, but I've been on the couch with heating pad for 2 weeks with back pain. My knee is doing good.  She was compliant with home exercise program.  Response to previous treatment: sore  Functional change: none noted    Pain: 1/10  Location: right knee      Objective     Kailee received therapeutic exercises to develop strength, endurance, ROM, flexibility, and posture for 35 minutes including:    Bike x 5 minutes  intensity 2   Slant board for bilateral calf stretch x 2 minutes   Double support squats total gym x 20  Double support calf raises total gym x 20  Double support calf presses 20 x 60#  Double support calf presses 15 x 60#  Hip adduction with bolster x 10  Swissball knee flexion x 15 repetitions   Swissball trunk rotation x 15  Supine hip adduction stretch x 5      Range of motion right knee:   Flexion            125 degrees   Extension  -2 degrees   Quad lag w/tke - 0 degrees     Kailee participated in neuromuscular re-education activities to improve: Posture for 10 minutes. The following activities were included:  Quad set with electrical  stimulation x 20  Terminal knee extension with electrical stimulation 20 x 5#      Home Exercises Provided and Patient Education Provided     Education provided: continue home exercise program    Written Home Exercises Provided: Patient instructed to cont prior HEP.  Exercises were reviewed and Kailee was able to demonstrate them prior to the end of the session.  Kailee demonstrated good  understanding of the education provided.     Assessment   Pt instructed to include back stretches to home exercise program. Patient had slight decreased with range of motion this treatment due to lack of activity the past 2 weeks recovering from fall.    Kailee Is progressing well towards her goals.   Pt prognosis is Excellent.     Pt will continue to benefit from skilled outpatient physical therapy to address the deficits listed in the problem list box on initial evaluation, provide pt/family education and to maximize pt's level of independence in the home and community environment.     Anticipated barriers to physical therapy: compliance with home exercise program     Goals:  Short Term Goals: 4 weeks   Pt will be independent with home ex program MET  Pt will be able to wean from walker to independent MET  Pt will be able to increase range of motion 0-125- met   Pt will be able to increase quad lag from -25 to 0 degrees   Return to driving -met  Increase strength to 3+/ 5     Long Term Goals: 6 weeks   Pt will ambulate without a limp   Be able to stoop and bend pain free  Be able to return to yardwork.-met    Plan     Plan of care Certification: 8/15/2023 to 10/10/2023      Outpatient Physical Therapy 2 times weekly for 8 weeks to include the following interventions: Electrical Stimulation nmes , Neuromuscular Re-ed, and Therapeutic Exercise.     Plan of care has been reestablished with Carol Peña LPTA, Esther Guajardo LPTA, Cherelle Austin LPTA  and Briana Mahmood LPTA.     Lucero Peña, PTA  8/15/2023

## 2023-08-15 NOTE — PLAN OF CARE
Physical Therapy Treatment Note      Name: Kailee Malone  Clinic Number: [id number]     Therapy Diagnosis:        Encounter Diagnoses   Name Primary?    Decreased ROM of right knee Yes    Status post unicompartmental knee replacement, right      Decreased strength of lower extremity        Physician: Dawood Jay MD     Visit Date: 8/1/2023     Physician Orders: PT Eval and Treat    Medical Diagnosis from Referral: right unicompartmental knee replacement   Evaluation Date: 4/13/2023  Updated Plan of Care Due : 8/11/23  Authorization Period Expiration: humana 10/10/23  Plan of Care Expiration: humana approved 34 visits plus eval thru 10/10  Visit # / Visits authorized: 24/ 34  PTA Visit #: 1     Time In: 1015  Time Out: 1055  Total Billable Time: 40 minutes   Ortho      Precautions: Standard     Subjective      Pt reports:  my  right knee started  feeling 'weird' over the weekend  She was compliant with home exercise program.  Response to previous treatment: sore  Functional change: none noted     Pain: 2/10  Location: right knee       Objective      Kailee received therapeutic exercises to develop strength, endurance, ROM, flexibility, and posture for 30 minutes including:     Bike x 5 minutes  intensity 2   Slant board for bilateral calf stretch x 2 minutes   Double support squats total gym x 20  Double support calf raises total gym x 20  Right single support leg presses 20 x 55#  Double support calf presses 15 x 60#  Right straight leg raise  x 10  Swissball knee flexion x 20 repetitions         Range of motion right knee:   Flexion            128 degrees   Extension        +4 degrees   Quad lag w/tke - 0 degrees      Kailee participated in neuromuscular re-education activities to improve: Posture for 10 minutes. The following activities were included:  Quad set with electrical stimulation x 20  Terminal knee extension with electrical stimulation 20 x 5#  Straight leg raise x 15 reps   Right lateral step  "downs 6" x 15     Home Exercises Provided and Patient Education Provided      Education provided: continue home exercise program     Written Home Exercises Provided: Patient instructed to cont prior HEP.  Exercises were reviewed and Kailee was able to demonstrate them prior to the end of the session.  Kailee demonstrated good  understanding of the education provided.      Assessment   Pt progressing with lateral stability.     Kailee Is progressing well towards her goals.   Pt prognosis is Excellent.      Pt will continue to benefit from skilled outpatient physical therapy to address the deficits listed in the problem list box on initial evaluation, provide pt/family education and to maximize pt's level of independence in the home and community environment.      Anticipated barriers to physical therapy: compliance with home exercise program      Goals:  Short Term Goals: 4 weeks   Pt will be independent with home ex program MET  Pt will be able to wean from walker to independent MET  Pt will be able to increase range of motion 0-125- met   Pt will be able to increase quad lag from -25 to 0 degrees   Return to driving -met  Increase strength to 3+/ 5     Long Term Goals: 6 weeks   Pt will ambulate without a limp   Be able to stoop and bend pain free  Be able to return to yardwork.-met     Plan   Reasons for Recertification of Therapy: cont PT     Plan     Updated Certification Period: 8/15/2023 to10/10/2023 human[employer] approved   Recommended Treatment Plan: 2 times per week for 8 weeks: Neuromuscular Re-ed, Patient Education, and Therapeutic Exercise  Other Recommendations: cont PT for endurance and strength     Chase Martinez, PT  8/15/2023      I CERTIFY THE NEED FOR THESE SERVICES FURNISHED UNDER THIS PLAN OF TREATMENT AND WHILE UNDER MY CARE.    Physician's comments:      Physician's Signature: ___________________________________________________   "

## 2023-08-17 ENCOUNTER — CLINICAL SUPPORT (OUTPATIENT)
Dept: REHABILITATION | Facility: HOSPITAL | Age: 78
End: 2023-08-17
Payer: MEDICARE

## 2023-08-17 DIAGNOSIS — R29.898 DECREASED STRENGTH OF LOWER EXTREMITY: Primary | ICD-10-CM

## 2023-08-17 DIAGNOSIS — Z96.651 STATUS POST UNICOMPARTMENTAL KNEE REPLACEMENT, RIGHT: ICD-10-CM

## 2023-08-17 DIAGNOSIS — M25.661 DECREASED ROM OF RIGHT KNEE: ICD-10-CM

## 2023-08-17 PROCEDURE — 97112 NEUROMUSCULAR REEDUCATION: CPT | Mod: PN,CQ

## 2023-08-17 PROCEDURE — 97110 THERAPEUTIC EXERCISES: CPT | Mod: PN,CQ

## 2023-08-17 NOTE — PROGRESS NOTES
"  Physical Therapy Treatment Note     Name: Kailee Malone  Clinic Number: [id number]    Therapy Diagnosis:   Encounter Diagnoses   Name Primary?    Decreased ROM of right knee     Status post unicompartmental knee replacement, right     Decreased strength of lower extremity Yes     Physician: Dawood Jay MD    Visit Date: 8/17/2023    Physician Orders: PT Eval and Treat    Medical Diagnosis from Referral: right unicompartmental knee replacement   Evaluation Date: 4/13/2023  Updated Plan of Care Due :  10/10/2023   Authorization Period Expiration: humana 10/10/23  Plan of Care Expiration: humana approved 34 visits plus eval thru 10/10  Visit # / Visits authorized: 26/ 34  PTA Visit #: 3    Time In: 0930  Time Out: 1015  Total Billable Time: 45 minutes   Ortho     Precautions: Standard    Continue per Plan of Care per Chase Martinez PT     Subjective     Pt reports: back is better; no pain in knee; no pain meds taken  She was compliant with home exercise program.  Response to previous treatment: better  Functional change: ongoing    Pain: 0/10  Location: right knee      Objective     Kailee received therapeutic exercises to develop strength, endurance, ROM, flexibility, and posture for 20 minutes including:  Bike x 6 minutes  Slant board for bilateral calf stretch x 2 minutes   Hamstring stretch on step, 3x20 second hold   Double support squats total gym x 30  Double support calf raises total gym x 30  Double support leg presses 20 x 60#  Double support calf presses 15 x 60#      Range of motion right knee:   Flexion             125 degrees   Extension   +2 degrees   Quad lag w/tke -2 degrees     Kailee participated in neuromuscular re-education activities to improve: Posture for 25 minutes. The following activities were included:  Long arc quads with green band x 20 repetitions   Seated Marching with green band x 20 repetitions   Hip abduction in sitting with green band x 20 repetitions   6" step ups x 20 " "repetitions, right lower extremity   6" step for alternating toe taps x 10-15 repetitions each  Sit to stand x 7 repetitions (fatigued)    Home Exercises Provided and Patient Education Provided     Education provided: continue home exercise program, pain free; issued green resistance band for use with home exercise program     Written Home Exercises Provided: Patient instructed to cont prior HEP.  Exercises were reviewed and Kailee was able to demonstrate them prior to the end of the session.  Kailee demonstrated good  understanding of the education provided.     Assessment     Pt able to increase repetitions as noted; added theraband exercises and issued band for home exercise program use; improved extension range of motion and quad strength as noted above  Kailee Is progressing well towards her goals.   Pt prognosis is Excellent.     Pt will continue to benefit from skilled outpatient physical therapy to address the deficits listed in the problem list box on initial evaluation, provide pt/family education and to maximize pt's level of independence in the home and community environment.     Anticipated barriers to physical therapy: compliance with home exercise program     Goals:  Short Term Goals: 4 weeks   Pt will be independent with home ex program MET  Pt will be able to wean from walker to independent MET  Pt will be able to increase range of motion 0-125- met   Pt will be able to increase quad lag from -25 to 0 degrees   Return to driving -met  Increase strength to 3+/ 5     Long Term Goals: 6 weeks   Pt will ambulate without a limp met  Be able to stoop and bend pain free  Be able to return to yardwork.-met    Plan     Plan of care Certification: 8/15/2023 to 10/10/2023      Outpatient Physical Therapy 2 times weekly for 8 weeks to include the following interventions: Electrical Stimulation nmes , Neuromuscular Re-ed, and Therapeutic Exercise.     Continue per Plan of Care and progress as pt able "   Esther Guajardo, PTA  8/17/2023

## 2023-08-22 ENCOUNTER — CLINICAL SUPPORT (OUTPATIENT)
Dept: REHABILITATION | Facility: HOSPITAL | Age: 78
End: 2023-08-22
Payer: MEDICARE

## 2023-08-22 ENCOUNTER — HOSPITAL ENCOUNTER (OUTPATIENT)
Dept: RADIOLOGY | Facility: HOSPITAL | Age: 78
Discharge: HOME | End: 2023-08-22
Attending: FAMILY MEDICINE
Payer: MEDICARE

## 2023-08-22 VITALS — BODY MASS INDEX: 30.99 KG/M2 | WEIGHT: 186 LBS | HEIGHT: 65 IN

## 2023-08-22 DIAGNOSIS — R29.898 DECREASED STRENGTH OF LOWER EXTREMITY: ICD-10-CM

## 2023-08-22 DIAGNOSIS — Z12.31 OTHER SCREENING MAMMOGRAM: ICD-10-CM

## 2023-08-22 DIAGNOSIS — Z96.651 STATUS POST UNICOMPARTMENTAL KNEE REPLACEMENT, RIGHT: ICD-10-CM

## 2023-08-22 DIAGNOSIS — M25.661 DECREASED ROM OF RIGHT KNEE: Primary | ICD-10-CM

## 2023-08-22 PROCEDURE — 97110 THERAPEUTIC EXERCISES: CPT | Mod: PN,CQ

## 2023-08-22 PROCEDURE — 97112 NEUROMUSCULAR REEDUCATION: CPT | Mod: PN,CQ

## 2023-08-22 PROCEDURE — 77067 SCR MAMMO BI INCL CAD: CPT | Mod: TC

## 2023-08-22 NOTE — PROGRESS NOTES
Physical Therapy Treatment Note     Name: Kailee Malone  Clinic Number: [id number]    Therapy Diagnosis:   Encounter Diagnoses   Name Primary?    Decreased ROM of right knee Yes    Status post unicompartmental knee replacement, right     Decreased strength of lower extremity      Physician: Dawood Jay MD    Visit Date: 8/22/2023    Physician Orders: PT Eval and Treat    Medical Diagnosis from Referral: right unicompartmental knee replacement   Evaluation Date: 4/13/2023  Updated Plan of Care Due :  10/10/2023   Authorization Period Expiration: humana 10/10/23  Plan of Care Expiration: humana approved 34 visits plus eval thru 10/10  Visit # / Visits authorized: 27/ 34  PTA Visit #: 4    Time In: 0930  Time Out: 1013  Total Billable Time: 43 minutes   Ortho     Precautions: Standard    Continue per Plan of Care per Chase Martinez PT     Subjective     Pt reports: back is better; no pain in knee; no pain meds taken  She was compliant with home exercise program.  Response to previous treatment: better  Functional change: ongoing    Pain: 0/10  Location: right knee      Objective     Kailee received therapeutic exercises to develop strength, endurance, ROM, flexibility, and posture for 18 minutes including:  Bike x 6 minutes  Slant board for bilateral calf stretch x 2 minutes   Hamstring stretch on step, 3x20 second hold   Double support squats total gym x 30  Double support calf raises total gym x 30  Double support leg presses 20 x 60#  Double support calf presses 20 x 60#      Range of motion right knee:   Flexion             120 degrees  active; 127 degrees passive  Extension   +2 degrees   Quad lag w/tke -2 degrees     Kailee participated in neuromuscular re-education activities to improve: Posture and Balance for 25 minutes. The following activities were included:  Long arc quads with green band x 20 repetitions (not today)  Seated Marching with green band x 20 repetitions (not today)  Hip abduction in  "sitting with green band x 20 repetitions (not today)  6" step ups x 20 repetitions, right lower extremity  6" lateral step up x 20  6" step for alternating toe taps x15 repetitions each  Sit to stand x 10 repetitions   Dynamic Balance: Sidestepping to left and Right, backwards gait and tandem gait forwards x 12 feet each    Home Exercises Provided and Patient Education Provided     Education provided: continue home exercise program, pain free; issued green resistance band for use with home exercise program     Written Home Exercises Provided: Patient instructed to cont prior HEP.  Exercises were reviewed and Kailee was able to demonstrate them prior to the end of the session.  Kailee demonstrated good  understanding of the education provided.     Assessment     Pt able to increase repetitions as noted; added theraband exercises and issued band for home exercise program use; improved extension range of motion and quad strength as noted above  Kailee Is progressing well towards her goals.   Pt prognosis is Excellent.     Pt will continue to benefit from skilled outpatient physical therapy to address the deficits listed in the problem list box on initial evaluation, provide pt/family education and to maximize pt's level of independence in the home and community environment.     Anticipated barriers to physical therapy: compliance with home exercise program     Goals:  Short Term Goals: 4 weeks   Pt will be independent with home ex program MET  Pt will be able to wean from walker to independent MET  Pt will be able to increase range of motion 0-125- met   Pt will be able to increase quad lag from -25 to 0 degrees   Return to driving -met  Increase strength to 3+/ 5     Long Term Goals: 6 weeks   Pt will ambulate without a limp met  Be able to stoop and bend pain free  Be able to return to yardwork.-met    Plan     Plan of care Certification: 8/15/2023 to 10/10/2023      Outpatient Physical Therapy 2 times weekly for " 8 weeks to include the following interventions: Electrical Stimulation nmes , Neuromuscular Re-ed, and Therapeutic Exercise.     Continue per Plan of Care and progress as pt able   Cherelle Austin, PTA  8/22/2023

## 2023-08-25 ENCOUNTER — CLINICAL SUPPORT (OUTPATIENT)
Dept: REHABILITATION | Facility: HOSPITAL | Age: 78
End: 2023-08-25
Payer: MEDICARE

## 2023-08-25 DIAGNOSIS — R29.898 DECREASED STRENGTH OF LOWER EXTREMITY: ICD-10-CM

## 2023-08-25 DIAGNOSIS — M25.661 DECREASED ROM OF RIGHT KNEE: Primary | ICD-10-CM

## 2023-08-25 DIAGNOSIS — Z96.651 STATUS POST UNICOMPARTMENTAL KNEE REPLACEMENT, RIGHT: ICD-10-CM

## 2023-08-25 PROCEDURE — 97110 THERAPEUTIC EXERCISES: CPT | Mod: PN

## 2023-08-25 PROCEDURE — 97112 NEUROMUSCULAR REEDUCATION: CPT | Mod: PN

## 2023-08-25 NOTE — PROGRESS NOTES
"  Physical Therapy Treatment Note     Name: Kailee Malone  Clinic Number: [id number]    Therapy Diagnosis:   Encounter Diagnoses   Name Primary?    Decreased ROM of right knee Yes    Status post unicompartmental knee replacement, right     Decreased strength of lower extremity      Physician: Dawood Jay MD    Visit Date: 8/25/2023    Physician Orders: PT Eval and Treat    Medical Diagnosis from Referral: right unicompartmental knee replacement   Evaluation Date: 4/13/2023  Updated Plan of Care Due :  10/10/2023   Authorization Period Expiration: humana 10/10/23  Plan of Care Expiration: humana approved 34 visits plus eval thru 10/10  Visit # / Visits authorized: 27/ 34  PTA Visit #: 4    Time In: 0930  Time Out: 1013  Total Billable Time: 43 minutes   Ortho     Precautions: Standard    Continue per Plan of Care per Chase Martinez PT     Subjective     Pt reports: back is better; no pain in knee; no pain meds taken  She was compliant with home exercise program.  Response to previous treatment: better  Functional change: ongoing    Pain: 0/10  Location: right knee      Objective     Kailee received therapeutic exercises to develop strength, endurance, ROM, flexibility, and posture for 18 minutes including:  Bike x 6 minutes  Slant board for bilateral calf stretch x 2 minutes   Hamstring stretch on step, 3x20 second hold   Double support squats total gym x 30  Double support calf raises total gym x 30  Double support leg presses 20 x 60#  Double support calf presses 20 x 60#      Range of motion right knee:   Flexion             130 degrees  active; 130 degrees passive  Extension   +2 degrees   Quad lag w/tke 0 degrees     Kailee participated in neuromuscular re-education activities to improve: Posture and Balance for 25 minutes. The following activities were included:    Hip abduction in sitting with green band x 20 repetitions (not today)  6" step ups x 20 repetitions, right lower extremity  6" lateral step up " "x 20  6" step for alternating toe taps x15 repetitions each  Sit to stand x 10 repetitions   Dynamic Balance: Sidestepping to left and Right, backwards gait and tandem gait forwards x 12 feet each    Home Exercises Provided and Patient Education Provided     Education provided: continue home exercise program, pain free; issued green resistance band for use with home exercise program     Written Home Exercises Provided: Patient instructed to cont prior HEP.  Exercises were reviewed and Kailee was able to demonstrate them prior to the end of the session.  Kailee demonstrated good  understanding of the education provided.     Assessment     Pt able to increase repetitions as noted; added theraband exercises and issued band for home exercise program use; improved extension range of motion and quad strength as noted above  Kailee Is progressing well towards her goals.   Pt prognosis is Excellent.     Pt will continue to benefit from skilled outpatient physical therapy to address the deficits listed in the problem list box on initial evaluation, provide pt/family education and to maximize pt's level of independence in the home and community environment.     Anticipated barriers to physical therapy: compliance with home exercise program     Goals:  Short Term Goals: 4 weeks   Pt will be independent with home ex program MET  Pt will be able to wean from walker to independent MET  Pt will be able to increase range of motion 0-125- met   Pt will be able to increase quad lag from -25 to 0 degrees   Return to driving -met  Increase strength to 3+/ 5     Long Term Goals: 6 weeks   Pt will ambulate without a limp met  Be able to stoop and bend pain free met   Be able to return to yardwork.-met    Plan     Plan of care Certification: 8/15/2023 to 10/10/2023      Outpatient Physical Therapy 2 times weekly for 8 weeks to include the following interventions: Electrical Stimulation nmes , Neuromuscular Re-ed, and Therapeutic " Exercise.     Chase Martinez, PT  8/25/2023

## 2023-08-25 NOTE — PLAN OF CARE
"  Physical Therapy Treatment Note     Name: Kailee Malone  Clinic Number: [id number]    Therapy Diagnosis:   Encounter Diagnoses   Name Primary?    Decreased ROM of right knee Yes    Status post unicompartmental knee replacement, right     Decreased strength of lower extremity      Physician: Dawood Jay MD    Visit Date: 8/25/2023    Physician Orders: PT Eval and Treat    Medical Diagnosis from Referral: right unicompartmental knee replacement   Evaluation Date: 4/13/2023  Updated Plan of Care Due :  10/10/2023   Authorization Period Expiration: humana 10/10/23  Plan of Care Expiration: humana approved 34 visits plus eval thru 10/10  Visit # / Visits authorized: 27/ 34  PTA Visit #: 4    Time In: 0930  Time Out: 1013  Total Billable Time: 43 minutes   Ortho     Precautions: Standard    Continue per Plan of Care per Chase Martinez PT     Subjective     Pt reports: back is better; no pain in knee; no pain meds taken  She was compliant with home exercise program.  Response to previous treatment: better  Functional change: ongoing    Pain: 0/10  Location: right knee      Objective     Kailee received therapeutic exercises to develop strength, endurance, ROM, flexibility, and posture for 18 minutes including:  Bike x 6 minutes  Slant board for bilateral calf stretch x 2 minutes   Hamstring stretch on step, 3x20 second hold   Double support squats total gym x 30  Double support calf raises total gym x 30  Double support leg presses 20 x 60#  Double support calf presses 20 x 60#      Range of motion right knee:   Flexion             130 degrees  active; 130 degrees passive  Extension   +2 degrees   Quad lag w/tke 0 degrees     Kailee participated in neuromuscular re-education activities to improve: Posture and Balance for 25 minutes. The following activities were included:    Hip abduction in sitting with green band x 20 repetitions (not today)  6" step ups x 20 repetitions, right lower extremity  6" lateral step up " "x 20  6" step for alternating toe taps x15 repetitions each  Sit to stand x 10 repetitions   Dynamic Balance: Sidestepping to left and Right, backwards gait and tandem gait forwards x 12 feet each    Home Exercises Provided and Patient Education Provided     Education provided: continue home exercise program, pain free; issued green resistance band for use with home exercise program     Written Home Exercises Provided: Patient instructed to cont prior HEP.  Exercises were reviewed and Kailee was able to demonstrate them prior to the end of the session.  Kailee demonstrated good  understanding of the education provided.     Assessment     Pt able to increase repetitions as noted; added theraband exercises and issued band for home exercise program use; improved extension range of motion and quad strength as noted above  Kailee Is progressing well towards her goals.   Pt prognosis is Excellent.     Pt will continue to benefit from skilled outpatient physical therapy to address the deficits listed in the problem list box on initial evaluation, provide pt/family education and to maximize pt's level of independence in the home and community environment.     Anticipated barriers to physical therapy: compliance with home exercise program     Goals:  Short Term Goals: 4 weeks   Pt will be independent with home ex program MET  Pt will be able to wean from walker to independent MET  Pt will be able to increase range of motion 0-125- met   Pt will be able to increase quad lag from -25 to 0 degrees   Return to driving -met  Increase strength to 3+/ 5     Long Term Goals: 6 weeks   Pt will ambulate without a limp met  Be able to stoop and bend pain free met   Be able to return to yardwork.-met    Plan     Outpatient Therapy Discharge Summary     Name: Kailee Malone  Clinic Number: [id number]    Therapy Diagnosis:   Encounter Diagnoses   Name Primary?    Decreased ROM of right knee Yes    Status post unicompartmental knee " replacement, right     Decreased strength of lower extremity      Physician: Dawood Jay MD       Assessment    Goals:  Pt met goals . Doing excellent     Discharge reason: Patient has met all of his/her goals    Plan   This patient is discharged from Physical Therapy.    Chase Martinez, PT

## 2023-09-06 DIAGNOSIS — Z96.652 S/P TOTAL KNEE ARTHROPLASTY, LEFT: Primary | ICD-10-CM

## 2023-09-20 ENCOUNTER — CLINICAL SUPPORT (OUTPATIENT)
Dept: REHABILITATION | Facility: HOSPITAL | Age: 78
End: 2023-09-20
Payer: MEDICARE

## 2023-09-20 DIAGNOSIS — Z96.652 S/P TOTAL KNEE ARTHROPLASTY, LEFT: Primary | ICD-10-CM

## 2023-09-20 DIAGNOSIS — M25.662 DECREASED RANGE OF MOTION (ROM) OF LEFT KNEE: ICD-10-CM

## 2023-09-20 DIAGNOSIS — M62.81 MUSCLE WEAKNESS OF LOWER EXTREMITY: ICD-10-CM

## 2023-09-20 PROCEDURE — 97110 THERAPEUTIC EXERCISES: CPT | Mod: PN

## 2023-09-20 PROCEDURE — 97161 PT EVAL LOW COMPLEX 20 MIN: CPT | Mod: PN

## 2023-09-20 NOTE — PLAN OF CARE
[facility]   Physical Therapy Initial Evaluation    Date: 9/20/2023   Name: Kailee Malone  Clinic Number: [id number]    Therapy Diagnosis:   Encounter Diagnoses   Name Primary?    S/P total knee arthroplasty, left Yes    Decreased range of motion (ROM) of left knee     Muscle weakness of lower extremity      Physician: Dawood Jay MD    Physician Orders: PT Eval and Treat    Medical Diagnosis from Referral: left knee total 9/5/2023  Evaluation Date: 9/20/2023  Updated Plan of Care Due : 11/20/2023  Authorization Period Expiration: humana   Plan of Care Expiration: see humana   Visit # / Visits authorized: 1/ 16    Time In: 930  Time Out: 1030  Total Appointment Time (timed & untimed codes): 45 minutes    Precautions: Standard    Subjective   Date of onset: pt voices she had left total knee 9/5/2023 . Pt voices she had low sodium and had to be hospitalized 5 extra days. Pt voices she has had home health and done well. Pt voices she is feeling some better.  Pt voices she is weak in her left lower extremity and is having to use rolling walker .  Pt voices she is unable to drive yet.  .    History of current condition - Kailee reports: hx below      Medical History:   Past Medical History:   Diagnosis Date    Acute gastric ulcer without hemorrhage or perforation 02/03/2022    Acute superficial gastritis without hemorrhage 02/03/2022    Kirkpatrick's esophagus without dysplasia 02/04/2022    Diverticula, colon 10/04/2021    HH (hiatus hernia) 02/03/2022    History of colon polyps 10/04/2021    Hypertension     Screening for colon cancer 10/04/2021       Surgical History:   Kailee Malnoe  has a past surgical history that includes RT WRIST; Hysterectomy; Breast surgery; Arthroscopy of knee (Right, 05/24/2021); Knee arthroscopy w/ meniscectomy (Right, 05/24/2021); Breast biopsy; and Oophorectomy.    Medications:   Kailee has a current medication list which includes the following prescription(s):  alendronate, buspirone, colestipol, ergocalciferol, escitalopram oxalate, estradiol, fluoxetine, hydroxyzine hcl, ketoconazole, ketoconazole, lansoprazole, prednisone, primidone, propranolol, and triamterene-hydrochlorothiazide 37.5-25 mg.    Allergies:   Review of patient's allergies indicates:   Allergen Reactions    Betadine prepstick [povidone-iodine-ethyl alcohol]     Penicillins     Povidone-iodine Other (See Comments)        Imaging, bone scan films:      Prior Therapy: home health left knee  Social History:   lives with their spouse  Occupation: retired   Prior Level of Function: independent   Current Level of Function: decreased left knee range of motion and strength .     Pain:  Current 7/10, worst 10/10, best 4/10   Location: left knee       Description: Aching and Dull  Aggravating Factors: Morning, Extension, Flexing, and Getting out of bed/chair  Easing Factors: nothing    Patients goals: be able to return to driving and walking pain free, be able to get back to yard work     Objective         Observation : decreased range of motion and strength .      Pronation/Supination : Right                                           Left     Incision :    healing well      Minimal swelling         Range of Motion/Strength :                  Left Extremity                                                                        Right Extremity   AROM PROM Strength  Location  AROM    PROM   Strength   90     Hip      Flexion 110  5                                        -25  3 Quad lag with terminal knee extension  0  5   105 115 3   Knee    Flexion 130  5   -5  3                Extension 0  5   12  4   Ankle   Dorsiflexion 12  5                     Plantar Flexion                        Inversion                        Eversion                     Functional Impairments :  decreased knee range of motion and strength      Limitation/Restriction for FOTO knee Survey    Therapist reviewed FOTO scores for Kailee Severino  Kira on 9/20/2023.   FOTO documents entered into [employer] - see Media section.    Limitation Score: 1%         TREATMENT         Kailee received the treatments listed below:  THERAPEUTIC EXERCISES to develop strength, endurance, ROM, flexibility, and posture for 20 minutes including home ex program         Home Exercises and Patient Education Provided    Education provided:   - Pt performed and received home ex program.     Written Home Exercises Provided: yes.  Exercises were reviewed and Kailee was able to demonstrate them prior to the end of the session.  Kailee demonstrated good  understanding of the education provided.     See EMR under Patient Instructions for exercises provided 9/20/2023.    Assessment   Kailee is a 78 y.o. female referred to outpatient Physical Therapy with a medical diagnosis of left total knee. Patient presents with decreased knee range of motion and strength.     Patient prognosis is Excellent.   Patientt will benefit from skilled outpatient Physical Therapy to address the deficits stated above and in the chart below, provide patient /family education, and to maximize patientt's level of independence.     Plan of care discussed with patient: Yes  Patient's spiritual, cultural and educational needs considered and patient is agreeable to the plan of care and goals as stated below:     Anticipated Barriers for therapy: decreased range of motion , decreased strength   Goals:  Short Term Goals: 4 weeks   Pt will improve range of motion 0-125 degrees   Increase quad lag to 0 degrees  Return to driving   Progress from walker to straight cane.     Long Term Goals: 6 weeks   Pt will be able to stoop and bend pain free   Return to yard work   Increase quad strength to 5/5     Plan   Plan of care Certification: 9/20/2023 to 11/01/2023 . 11 visits     Outpatient Physical Therapy 2 times weekly for 8weeks to include the following interventions: Neuromuscular Re-ed, Patient Education, and Therapeutic  Exercise    Plan of care has been reestablished with Carol Peña LPTA, Esther Guajardo LPTA, Cherelle Austin LPTA  and Briana Mahmood LPTA.   .     Chase Martinez, PT            I CERTIFY THE NEED FOR THESE SERVICES FURNISHED UNDER THIS PLAN OF TREATMENT AND WHILE UNDER MY CARE.    Physician's comments:      Physician's Signature: ___________________________________________________

## 2023-09-22 ENCOUNTER — CLINICAL SUPPORT (OUTPATIENT)
Dept: REHABILITATION | Facility: HOSPITAL | Age: 78
End: 2023-09-22
Payer: MEDICARE

## 2023-09-22 DIAGNOSIS — R29.898 DECREASED STRENGTH OF LOWER EXTREMITY: ICD-10-CM

## 2023-09-22 DIAGNOSIS — M25.662 DECREASED RANGE OF MOTION (ROM) OF LEFT KNEE: ICD-10-CM

## 2023-09-22 DIAGNOSIS — M62.81 MUSCLE WEAKNESS OF LOWER EXTREMITY: Primary | ICD-10-CM

## 2023-09-22 PROCEDURE — 97112 NEUROMUSCULAR REEDUCATION: CPT | Mod: PN,CQ

## 2023-09-22 PROCEDURE — 97110 THERAPEUTIC EXERCISES: CPT | Mod: PN,CQ

## 2023-09-22 NOTE — PROGRESS NOTES
Physical Therapy Treatment Note     Name: Kailee Malone  Clinic Number: [id number]    Therapy Diagnosis:   Encounter Diagnosis   Name Primary?    Decreased range of motion (ROM) of left knee Yes     Physician: Dawood Jay MD    Visit Date: 9/22/2023    Physician Orders: PT Eval and Treat    Medical Diagnosis from Referral: left knee total 9/5/2023  Evaluation Date: 9/20/2023  Updated Plan of Care Due : 11/20/2023  Authorization Period Expiration: humana   Plan of Care Expiration: see humana   Visit # / Visits authorized: 2/ 16  PTA Visit #: 1    Time In: 1018  Time Out: 1058  Total Billable Time: 40 minutes    Precautions: Standard    Received Plan of Care per Chase Martinez PT    Subjective     Pt reports: pain mostly in back today; doesn't feel like doing much. On walker  She was compliant with home exercise program.  Response to previous treatment: no c/o  Functional change: improving strength    Pain: 3/10  Location: left knee; 8/10 back pain     Objective     Left knee range of motion measures:   Flexion  107/110 degrees   Extension            0 degrees   Quad lag         -10 degrees        Kailee received therapeutic exercises to develop strength, ROM, and flexibility for 10 minutes including:  Swiss ball bilateral knee flexion stretching x 30 repetitions   Range of motion measures as noted above      Kailee participated in neuromuscular re-education activities to improve: Balance and Proprioception for 15 minutes (+5 minutes set up). The following activities were included:  Quad sets x 20 with electrical muscle stimulation   Short arc quads x 20 with electrical muscle stimulation   Straight leg raises x 10 with electrical muscle stimulation     Kailee participated in dynamic functional therapeutic activities to improve functional performance for 5 minutes, including:  Mat mobility transfers   Sit to stand transfers     Kailee participated in gait training to improve functional mobility and safety  for 0  minutes, including:    Kailee received the following direct contact modalities after being cleared for contraindications:     Kailee received the following supervised modalities after being cleared for contradictions:     Kailee received hot pack for 20 minutes to back during treatment per pt request/per PT .      Home Exercises Provided and Patient Education Provided     Education provided: continue current home exercise program, pain free     Written Home Exercises Provided: Patient instructed to cont prior HEP.  Exercises were reviewed and Kailee was able to demonstrate them prior to the end of the session.  Kailee demonstrated good  understanding of the education provided.     See EMR under Patient Instructions for exercises provided  9/20/2023 .    Assessment     Improved extension range of motion to 0 degrees, improving strength as evidenced by less quad lag as noted  Improved pain post treatment: 2/10 knee, 4/10 back  Pt slow with exercises and transfers   Kailee Is progressing well towards her goals.   Pt prognosis is Excellent.     Pt will continue to benefit from skilled outpatient physical therapy to address the deficits listed in the problem list box on initial evaluation, provide pt/family education and to maximize pt's level of independence in the home and community environment.      Anticipated barriers to physical therapy: back pain    Goals:  Short Term Goals: 4 weeks   Pt will improve range of motion 0-125 degrees   Increase quad lag to 0 degrees  Return to driving   Progress from walker to straight cane.      Long Term Goals: 6 weeks   Pt will be able to stoop and bend pain free   Return to yard work   Increase quad strength to 5/5     Plan     Plan of care Certification: 9/20/2023 to 11/01/2023 . 11 visits      Outpatient Physical Therapy 2 times weekly for 8weeks to include the following interventions: Neuromuscular Re-ed, Patient Education, and Therapeutic Exercise    Continue per  Plan of Care and progress as pt able   Esther Guajardo, PTA  9/22/2023

## 2023-09-25 ENCOUNTER — CLINICAL SUPPORT (OUTPATIENT)
Dept: REHABILITATION | Facility: HOSPITAL | Age: 78
End: 2023-09-25
Payer: MEDICARE

## 2023-09-25 DIAGNOSIS — M25.662 DECREASED RANGE OF MOTION (ROM) OF LEFT KNEE: Primary | ICD-10-CM

## 2023-09-25 PROCEDURE — 97112 NEUROMUSCULAR REEDUCATION: CPT | Mod: PN,CQ

## 2023-09-25 PROCEDURE — 97110 THERAPEUTIC EXERCISES: CPT | Mod: PN,CQ

## 2023-09-25 NOTE — PROGRESS NOTES
Physical Therapy Treatment Note     Name: Kailee Malone  Clinic Number: [id number]    Therapy Diagnosis:   Encounter Diagnosis   Name Primary?    Decreased range of motion (ROM) of left knee Yes     Physician: Dawood Jay MD    Visit Date: 9/25/2023    Physician Orders: PT Eval and Treat    Medical Diagnosis from Referral: left knee total 9/5/2023  Evaluation Date: 9/20/2023  Updated Plan of Care Due : 11/20/2023  Authorization Period Expiration: humana   Plan of Care Expiration: see humana   Visit # / Visits authorized: 3/ 11  PTA Visit #: 2    Time In: 1015  Time Out: 1055  Total Billable Time: 40 minutes    Precautions: Standard    Received Plan of Care per Chase Martinez PT    Subjective     Pt reports: I'm having the shakes really bad, I thought it might be the pain pills so I didn't take them for 2 days. I had to take 1/2 a pill yesterday after [employer]  She was compliant with home exercise program.  Response to previous treatment: no c/o  Functional change: improving strength    Pain: 7/10  Location: left knee;      Objective     Left knee range of motion measures:   Flexion  110/113 degrees   Extension           - 3 degrees   Quad lag         -10 degrees        Kailee received therapeutic exercises to develop strength, ROM, and flexibility for 20 minutes including:  Swiss ball bilateral knee flexion stretching x 30 repetitions   Double support squats total gym x 20  Double support calf raises total gym x 15  Bike x 5' seat level 4  Double support leg presses 20 x 50#    Kailee participated in neuromuscular re-education activities to improve: Balance and Proprioception for  20  minutes . The following activities were included:  Quad sets x 20 with electrical muscle stimulation   Short arc quads x 20 with electrical muscle stimulation   Slant board x 2'    Kailee participated in dynamic functional therapeutic activities to improve functional performance for 0 minutes, including:      Kailee  participated in gait training to improve functional mobility and safety for 0  minutes, including:    Kailee received the following direct contact modalities after being cleared for contraindications:     Kailee received the following supervised modalities after being cleared for contradictions:     Kailee received hot pack for minutes to back during treatment per pt request/per PT .      Home Exercises Provided and Patient Education Provided     Education provided: continue current home exercise program, pain free     Written Home Exercises Provided: Patient instructed to cont prior HEP.  Exercises were reviewed and Kailee was able to demonstrate them prior to the end of the session.  Kailee demonstrated good  understanding of the education provided.     See EMR under Patient Instructions for exercises provided  9/20/2023 .    Assessment     Improved extension range of motion to 0 degrees, improving strength as evidenced by less quad lag as noted  Improved pain post treatment: 2/10 knee, 4/10 back  Pt slow with exercises and transfers   Kailee Is progressing well towards her goals.   Pt prognosis is Excellent.     Pt will continue to benefit from skilled outpatient physical therapy to address the deficits listed in the problem list box on initial evaluation, provide pt/family education and to maximize pt's level of independence in the home and community environment.      Anticipated barriers to physical therapy: back pain    Goals:  Short Term Goals: 4 weeks   Pt will improve range of motion 0-125 degrees   Increase quad lag to 0 degrees  Return to driving   Progress from walker to straight cane.      Long Term Goals: 6 weeks   Pt will be able to stoop and bend pain free   Return to yard work   Increase quad strength to 5/5     Plan     Plan of care Certification: 9/20/2023 to 11/01/2023 . 11 visits      Outpatient Physical Therapy 2 times weekly for 8weeks to include the following interventions:  Neuromuscular Re-ed, Patient Education, and Therapeutic Exercise    Continue per Plan of Care and progress as pt able   Lucero Peña, PTA  9/25/2023

## 2023-09-27 ENCOUNTER — CLINICAL SUPPORT (OUTPATIENT)
Dept: REHABILITATION | Facility: HOSPITAL | Age: 78
End: 2023-09-27
Payer: MEDICARE

## 2023-09-27 DIAGNOSIS — M25.662 DECREASED RANGE OF MOTION (ROM) OF LEFT KNEE: Primary | ICD-10-CM

## 2023-09-27 PROCEDURE — 97110 THERAPEUTIC EXERCISES: CPT | Mod: PN

## 2023-09-27 PROCEDURE — 97112 NEUROMUSCULAR REEDUCATION: CPT | Mod: PN

## 2023-09-27 NOTE — PROGRESS NOTES
Physical Therapy Treatment Note     Name: Kailee Malone  Clinic Number: [id number]    Therapy Diagnosis:   Encounter Diagnosis   Name Primary?    Decreased range of motion (ROM) of left knee Yes     Physician: Dawood Jay MD    Visit Date: 9/27/2023    Physician Orders: PT Eval and Treat    Medical Diagnosis from Referral: left knee total 9/5/2023  Evaluation Date: 9/20/2023  Updated Plan of Care Due : 11/20/2023  Authorization Period Expiration: humana   Plan of Care Expiration: see humana   Visit # / Visits authorized: 4/ 11  PTA Visit #:     Time In: 1353  Time Out: 1440  Total Billable Time: 47 minutes    Precautions: Standard        Subjective     Pt reports: I'm still shaky feeling at times .  Still stiffness with pain   She was compliant with home exercise program.  Response to previous treatment: no c/o  Functional change: improving strength    Pain: 7/10  Location: left knee;      Objective     Left knee range of motion measures:   Flexion  110/113 degrees   Extension           - 3 degrees   Quad lag         -18 degrees        Kailee received therapeutic exercises to develop strength, ROM, and flexibility for 20 minutes including:  Swiss ball bilateral knee flexion stretching x 30 repetitions   Double support squats total gym x 20  Double support calf raises total gym x 15  Bike x 5' seat level 4  Double support leg presses 20 x 50#    Kailee participated in neuromuscular re-education activities to improve: Balance and Proprioception for  20  minutes . The following activities were included:  Quad sets x 20 with electrical muscle stimulation   Short arc quads x 20 with electrical muscle stimulation   Slant board x 2'         Home Exercises Provided and Patient Education Provided     Education provided: continue current home exercise program, pain free     Written Home Exercises Provided: Patient instructed to cont prior HEP.  Exercises were reviewed and Kailee was able to demonstrate them prior  to the end of the session.  Kailee demonstrated good  understanding of the education provided.     See EMR under Patient Instructions for exercises provided  9/20/2023 .    Assessment     Improved extension range of motion to 0 degrees, improving strength as evidenced by less quad lag as noted  Improved pain post treatment: 2/10 knee, 4/10 back  Pt slow with exercises and transfers   Kailee Is progressing well towards her goals.   Pt prognosis is Excellent.     Pt will continue to benefit from skilled outpatient physical therapy to address the deficits listed in the problem list box on initial evaluation, provide pt/family education and to maximize pt's level of independence in the home and community environment.      Anticipated barriers to physical therapy: back pain    Goals:  Short Term Goals: 4 weeks   Pt will improve range of motion 0-125 degrees   Increase quad lag to 0 degrees  Return to driving   Progress from walker to straight cane.      Long Term Goals: 6 weeks   Pt will be able to stoop and bend pain free   Return to yard work   Increase quad strength to 5/5     Plan     Plan of care Certification: 9/20/2023 to 11/01/2023 . 11 visits      Outpatient Physical Therapy 2 times weekly for 8weeks to include the following interventions: Neuromuscular Re-ed, Patient Education, and Therapeutic Exercise       Plan of care has been reestablished with Carol Peña LPTA, Esther Guajardo LPTA, Cherelle Austin LPTA  and Briana Mahmood LPTA.     Chase Martinez, PT  9/27/2023

## 2023-09-29 ENCOUNTER — OFFICE VISIT (OUTPATIENT)
Dept: FAMILY MEDICINE | Facility: CLINIC | Age: 78
End: 2023-09-29
Payer: MEDICARE

## 2023-09-29 VITALS
SYSTOLIC BLOOD PRESSURE: 106 MMHG | BODY MASS INDEX: 31.63 KG/M2 | TEMPERATURE: 98 F | RESPIRATION RATE: 20 BRPM | DIASTOLIC BLOOD PRESSURE: 75 MMHG | HEART RATE: 76 BPM | OXYGEN SATURATION: 98 % | HEIGHT: 65 IN | WEIGHT: 189.81 LBS

## 2023-09-29 DIAGNOSIS — N30.01 ACUTE CYSTITIS WITH HEMATURIA: Primary | ICD-10-CM

## 2023-09-29 LAB
BILIRUB UR QL STRIP: NEGATIVE
CLARITY UR: ABNORMAL
COLOR UR: YELLOW
GLUCOSE UR STRIP-MCNC: NORMAL MG/DL
KETONES UR STRIP-SCNC: ABNORMAL MG/DL
LEUKOCYTE ESTERASE UR QL STRIP: ABNORMAL
MUCOUS, UA: ABNORMAL /LPF
NITRITE UR QL STRIP: NEGATIVE
PH UR STRIP: 6 PH UNITS
PROT UR QL STRIP: 100
RBC # UR STRIP: ABNORMAL /UL
RBC #/AREA URNS HPF: 44 /HPF
SP GR UR STRIP: 1.03
SQUAMOUS #/AREA URNS LPF: ABNORMAL /HPF
UROBILINOGEN UR STRIP-ACNC: 2 MG/DL
WBC #/AREA URNS HPF: >182 /HPF
WBC CLUMPS, UA: ABNORMAL /HPF

## 2023-09-29 PROCEDURE — 87077 CULTURE, URINE: ICD-10-PCS | Mod: ,,, | Performed by: CLINICAL MEDICAL LABORATORY

## 2023-09-29 PROCEDURE — 1101F PT FALLS ASSESS-DOCD LE1/YR: CPT | Mod: ,,, | Performed by: STUDENT IN AN ORGANIZED HEALTH CARE EDUCATION/TRAINING PROGRAM

## 2023-09-29 PROCEDURE — 81001 URINALYSIS, REFLEX TO URINE CULTURE: ICD-10-PCS | Mod: ,,, | Performed by: CLINICAL MEDICAL LABORATORY

## 2023-09-29 PROCEDURE — 3078F PR MOST RECENT DIASTOLIC BLOOD PRESSURE < 80 MM HG: ICD-10-PCS | Mod: ,,, | Performed by: STUDENT IN AN ORGANIZED HEALTH CARE EDUCATION/TRAINING PROGRAM

## 2023-09-29 PROCEDURE — 1101F PR PT FALLS ASSESS DOC 0-1 FALLS W/OUT INJ PAST YR: ICD-10-PCS | Mod: ,,, | Performed by: STUDENT IN AN ORGANIZED HEALTH CARE EDUCATION/TRAINING PROGRAM

## 2023-09-29 PROCEDURE — 81001 URINALYSIS AUTO W/SCOPE: CPT | Mod: ,,, | Performed by: CLINICAL MEDICAL LABORATORY

## 2023-09-29 PROCEDURE — 1159F PR MEDICATION LIST DOCUMENTED IN MEDICAL RECORD: ICD-10-PCS | Mod: ,,, | Performed by: STUDENT IN AN ORGANIZED HEALTH CARE EDUCATION/TRAINING PROGRAM

## 2023-09-29 PROCEDURE — 3074F SYST BP LT 130 MM HG: CPT | Mod: ,,, | Performed by: STUDENT IN AN ORGANIZED HEALTH CARE EDUCATION/TRAINING PROGRAM

## 2023-09-29 PROCEDURE — 3078F DIAST BP <80 MM HG: CPT | Mod: ,,, | Performed by: STUDENT IN AN ORGANIZED HEALTH CARE EDUCATION/TRAINING PROGRAM

## 2023-09-29 PROCEDURE — 3288F PR FALLS RISK ASSESSMENT DOCUMENTED: ICD-10-PCS | Mod: ,,, | Performed by: STUDENT IN AN ORGANIZED HEALTH CARE EDUCATION/TRAINING PROGRAM

## 2023-09-29 PROCEDURE — 99213 OFFICE O/P EST LOW 20 MIN: CPT | Mod: ,,, | Performed by: STUDENT IN AN ORGANIZED HEALTH CARE EDUCATION/TRAINING PROGRAM

## 2023-09-29 PROCEDURE — 1159F MED LIST DOCD IN RCRD: CPT | Mod: ,,, | Performed by: STUDENT IN AN ORGANIZED HEALTH CARE EDUCATION/TRAINING PROGRAM

## 2023-09-29 PROCEDURE — 87086 CULTURE, URINE: ICD-10-PCS | Mod: ,,, | Performed by: CLINICAL MEDICAL LABORATORY

## 2023-09-29 PROCEDURE — 87086 URINE CULTURE/COLONY COUNT: CPT | Mod: ,,, | Performed by: CLINICAL MEDICAL LABORATORY

## 2023-09-29 PROCEDURE — 3288F FALL RISK ASSESSMENT DOCD: CPT | Mod: ,,, | Performed by: STUDENT IN AN ORGANIZED HEALTH CARE EDUCATION/TRAINING PROGRAM

## 2023-09-29 PROCEDURE — 1125F PR PAIN SEVERITY QUANTIFIED, PAIN PRESENT: ICD-10-PCS | Mod: ,,, | Performed by: STUDENT IN AN ORGANIZED HEALTH CARE EDUCATION/TRAINING PROGRAM

## 2023-09-29 PROCEDURE — 1125F AMNT PAIN NOTED PAIN PRSNT: CPT | Mod: ,,, | Performed by: STUDENT IN AN ORGANIZED HEALTH CARE EDUCATION/TRAINING PROGRAM

## 2023-09-29 PROCEDURE — 87077 CULTURE AEROBIC IDENTIFY: CPT | Mod: ,,, | Performed by: CLINICAL MEDICAL LABORATORY

## 2023-09-29 PROCEDURE — 99213 PR OFFICE/OUTPT VISIT, EST, LEVL III, 20-29 MIN: ICD-10-PCS | Mod: ,,, | Performed by: STUDENT IN AN ORGANIZED HEALTH CARE EDUCATION/TRAINING PROGRAM

## 2023-09-29 PROCEDURE — 87186 CULTURE, URINE: ICD-10-PCS | Mod: XU,,, | Performed by: CLINICAL MEDICAL LABORATORY

## 2023-09-29 PROCEDURE — 3074F PR MOST RECENT SYSTOLIC BLOOD PRESSURE < 130 MM HG: ICD-10-PCS | Mod: ,,, | Performed by: STUDENT IN AN ORGANIZED HEALTH CARE EDUCATION/TRAINING PROGRAM

## 2023-09-29 PROCEDURE — 87186 SC STD MICRODIL/AGAR DIL: CPT | Mod: XU,,, | Performed by: CLINICAL MEDICAL LABORATORY

## 2023-09-29 RX ORDER — TRAMADOL HYDROCHLORIDE 50 MG/1
TABLET ORAL
COMMUNITY
Start: 2023-08-03

## 2023-09-29 RX ORDER — OXYCODONE AND ACETAMINOPHEN 5; 325 MG/1; MG/1
TABLET ORAL
COMMUNITY
Start: 2023-09-05

## 2023-09-29 RX ORDER — CETIRIZINE HYDROCHLORIDE, PSEUDOEPHEDRINE HYDROCHLORIDE 5; 120 MG/1; MG/1
TABLET, FILM COATED, EXTENDED RELEASE ORAL
COMMUNITY
Start: 2023-08-30

## 2023-09-29 RX ORDER — MONTELUKAST SODIUM 4 MG/1
TABLET, CHEWABLE ORAL
COMMUNITY
End: 2024-01-04

## 2023-09-29 RX ORDER — SULFAMETHOXAZOLE AND TRIMETHOPRIM 800; 160 MG/1; MG/1
1 TABLET ORAL 2 TIMES DAILY
Qty: 6 TABLET | Refills: 0 | Status: SHIPPED | OUTPATIENT
Start: 2023-09-29 | End: 2023-10-02

## 2023-09-29 RX ORDER — SODIUM CHLORIDE 1 G/1
1000 TABLET ORAL 2 TIMES DAILY
COMMUNITY
Start: 2023-09-13

## 2023-09-29 RX ORDER — HYDROMORPHONE HYDROCHLORIDE 2 MG/1
2 TABLET ORAL
COMMUNITY
Start: 2023-09-05

## 2023-09-29 NOTE — PROGRESS NOTES
Health Maintenance Due   Topic Date Due    Hepatitis C Screening  Never done    Pneumococcal Vaccines (Age 65+) (1 - PCV) Never done    TETANUS VACCINE  Never done    Shingles Vaccine (1 of 2) Never done    Influenza Vaccine (1) Never done     Discussed care gaps with pt not interested in any vaccines r/t reason why shes in the drs office

## 2023-09-29 NOTE — PROGRESS NOTES
Progress Note     ALEKS DE JESUS MD   [address]  [place] [address]     PATIENT NAME: Kailee Malone  : 1945  DATE: 23  MRN: [id number]      Billing Provider: ALEKS DE JESUS MD  Level of Service:   Patient PCP Information       Provider PCP Type    Caden Bradford MD General                Urinary Tract Infection (C/O burning and stinging while urinating since first of the week stated urgency )      SUBJECTIVE:     Kailee Malone is a 78 y.o.female who presents to clinic for Urinary Tract Infection (C/O burning and stinging while urinating since first of the week stated urgency )    Patient presents to clinic today with UTI symptoms.  Patient notes onset of symptoms earlier in the week with some pressure.  However, she developed dysuria this morning with associated urgency.  She denies abdominal pain or flank pain.  Patient with some intermittent nausea but no vomiting.  Patient denies any fever.  Patient last had a UTI in July and was treated with Bactrim.  Her culture grew pansensitive E coli.  Notes that she tolerated the Bactrim very well known like to have this medication again.      Past Medical History:  has a past medical history of Acute gastric ulcer without hemorrhage or perforation (2022), Acute superficial gastritis without hemorrhage (2022), Kirkpatrick's esophagus without dysplasia (2022), Diverticula, colon (10/04/2021), HH (hiatus hernia) (2022), History of colon polyps (10/04/2021), Hypertension, and Screening for colon cancer (10/04/2021).   Past Surgical History:  has a past surgical history that includes RT WRIST; Hysterectomy; Breast surgery; Arthroscopy of knee (Right, 2021); Knee arthroscopy w/ meniscectomy (Right, 2021); Breast biopsy; and Oophorectomy.  Family History: family history includes Breast cancer in her maternal aunt and mother.  Social History:  reports that she has never smoked. She has  never used smokeless tobacco. She reports that she does not drink alcohol and does not use drugs.  Allergies:   Review of patient's allergies indicates:   Allergen Reactions    Povidone-iodine Other (See Comments)    Povidone-iodine-ethyl alcohol Other (See Comments)     BURN    Penicillins Rash and Hives         Current Outpatient Medications:     alendronate (FOSAMAX) 70 MG tablet, Take 70 mg by mouth every 7 days., Disp: , Rfl:     colestipoL (COLESTID) 1 gram Tab, Take 1 tablet (1 g total) by mouth 2 (two) times daily., Disp: 180 tablet, Rfl: 1    colestipoL (COLESTID) 1 gram Tab, 2 tablets Orally Once a day for 30 day(s), Disp: , Rfl:     ergocalciferol (ERGOCALCIFEROL) 50,000 unit Cap, Take 1 capsule (50,000 Units total) by mouth every 30 days. Take one capsule weekly for 12 weeks then reduce to one capsule monthly, Disp: 3 capsule, Rfl: 3    EScitalopram oxalate (LEXAPRO) 20 MG tablet, Take 20 mg by mouth., Disp: , Rfl:     HYDROmorphone (DILAUDID) 2 MG tablet, Take 2 mg by mouth every 4 to 6 hours as needed., Disp: , Rfl:     hydrOXYzine HCL (ATARAX) 25 MG tablet, Take 25 mg by mouth every 6 (six) hours as needed., Disp: , Rfl:     ketoconazole (NIZORAL) 2 % cream, APPLY TO AFFECTED AREAS TWICE DAILY UNTIL CLEAR, Disp: , Rfl:     ketoconazole (NIZORAL) 2 % shampoo, APPLY ON BACK LEAVE ON FOR 5 MINUTES THEN RINSE, 2 TO 3 TIMES WEEKLY, Disp: , Rfl:     lansoprazole (PREVACID) 30 MG capsule, Take 1 capsule (30 mg total) by mouth once daily., Disp: 90 capsule, Rfl: 3    oxyCODONE-acetaminophen (PERCOCET) 5-325 mg per tablet, TAKE 1 TO 2 TABLETS BY MOUTH EVERY 4 TO 6 HOURS AS NEEDED FOR PAIN, Disp: , Rfl:     predniSONE (DELTASONE) 5 MG tablet, Take 5 mg by mouth., Disp: , Rfl:     primidone (MYSOLINE) 50 MG Tab, Take one tablet at bedtime, Disp: 90 tablet, Rfl: 3    propranoloL (INDERAL) 60 MG tablet, Take one tablet daily, monitor heart rate and blood pressure before taking, Disp: 90 tablet, Rfl: 3    SENNA  "PLUS 8.6-50 mg per tablet, TAKE ONE TO TWO TABLETS BY MOUTH EVERYDAY WHILE TAKING NARCOTIC PAIN MEDS TO PREVENT CONSTIPATION, Disp: , Rfl:     sodium chloride 1,000 mg TbSO oral tablet, Take 1,000 mg by mouth 2 (two) times daily., Disp: , Rfl:     traMADoL (ULTRAM) 50 mg tablet, 1 tablet as needed Orally every 8 hours for 7 days, Disp: , Rfl:     triamterene-hydrochlorothiazide 37.5-25 mg (MAXZIDE-25) 37.5-25 mg per tablet, Take 1 tablet by mouth once daily., Disp: , Rfl:     busPIRone (BUSPAR) 5 MG Tab, Take 5 mg by mouth 2 (two) times daily., Disp: , Rfl:     estradioL (ESTRACE) 0.01 % (0.1 mg/gram) vaginal cream, Place 1 g vaginally once daily. (Patient not taking: Reported on 1/18/2023), Disp: 42.5 g, Rfl: 6    FLUoxetine 20 MG capsule, Take 20 mg by mouth once daily., Disp: , Rfl:     sulfamethoxazole-trimethoprim 800-160mg (BACTRIM DS) 800-160 mg Tab, Take 1 tablet by mouth 2 (two) times daily. for 3 days, Disp: 6 tablet, Rfl: 0   OBJECTIVE:     Vital Signs   /75 (BP Location: Left arm, Patient Position: Sitting, BP Method: Large (Automatic))   Pulse 76   Temp 97.8 °F (36.6 °C) (Temporal)   Resp 20   Ht 5' 5" (1.651 m)   Wt 86.1 kg (189 lb 12.8 oz)   SpO2 98%   BMI 31.58 kg/m²     Physical Exam  Constitutional:       General: She is not in acute distress.     Appearance: Normal appearance. She is not ill-appearing, toxic-appearing or diaphoretic.   HENT:      Head: Normocephalic and atraumatic.   Eyes:      Extraocular Movements: Extraocular movements intact.      Pupils: Pupils are equal, round, and reactive to light.   Cardiovascular:      Rate and Rhythm: Normal rate and regular rhythm.      Pulses: Normal pulses.      Heart sounds: Normal heart sounds. No murmur heard.     No friction rub. No gallop.   Pulmonary:      Effort: Pulmonary effort is normal. No respiratory distress.      Breath sounds: No wheezing, rhonchi or rales.   Abdominal:      General: Abdomen is flat.      Palpations: " Abdomen is soft.      Tenderness: There is no abdominal tenderness. There is no right CVA tenderness, left CVA tenderness, guarding or rebound.   Musculoskeletal:         General: Normal range of motion.      Cervical back: Normal range of motion.   Skin:     General: Skin is warm and dry.      Capillary Refill: Capillary refill takes less than 2 seconds.   Neurological:      General: No focal deficit present.      Mental Status: She is alert.   Psychiatric:         Mood and Affect: Mood normal.         Behavior: Behavior normal.         ASSESSMENT/PLAN:     1. Acute cystitis with hematuria  -     Urinalysis, Reflex to Urine Culture  -     sulfamethoxazole-trimethoprim 800-160mg (BACTRIM DS) 800-160 mg Tab; Take 1 tablet by mouth 2 (two) times daily. for 3 days  Dispense: 6 tablet; Refill: 0    Patient's dipstick concerning for UTI.  We will obtain formal urinalysis with culture if indicated.  We will treat with Bactrim while awaiting formal results.  We will adjust regimen if indicated.  Emergency precautions were discussed in detail.  Patient to follow up if symptoms worsen or fail to improve.    Follow up if symptoms worsen or fail to improve.      ALEKS DE JESUS MD  09/29/2023

## 2023-10-02 LAB
UA COMPLETE W REFLEX CULTURE PNL UR: ABNORMAL
UA COMPLETE W REFLEX CULTURE PNL UR: ABNORMAL

## 2023-10-04 ENCOUNTER — CLINICAL SUPPORT (OUTPATIENT)
Dept: REHABILITATION | Facility: HOSPITAL | Age: 78
End: 2023-10-04
Payer: MEDICARE

## 2023-10-04 DIAGNOSIS — M25.662 DECREASED RANGE OF MOTION (ROM) OF LEFT KNEE: Primary | ICD-10-CM

## 2023-10-04 PROCEDURE — 97110 THERAPEUTIC EXERCISES: CPT | Mod: PN,CQ

## 2023-10-04 PROCEDURE — 97112 NEUROMUSCULAR REEDUCATION: CPT | Mod: PN,CQ

## 2023-10-04 NOTE — PROGRESS NOTES
Physical Therapy Treatment Note     Name: Kailee Malone  Clinic Number: [id number]    Therapy Diagnosis:   Encounter Diagnosis   Name Primary?    Decreased range of motion (ROM) of left knee Yes     Physician: Dawood Jay MD    Visit Date: 10/4/2023    Physician Orders: PT Eval and Treat    Medical Diagnosis from Referral: left knee total 9/5/2023  Evaluation Date: 9/20/2023  Updated Plan of Care Due : 11/20/2023  Authorization Period Expiration: humana   Plan of Care Expiration: see humana   Visit # / Visits authorized: 5/ 11  PTA Visit #: 1    Time In: 1015  Time Out: 1055  Total Billable Time: 40 minutes    Precautions: Standard; dizzy position changes    Continue per Plan of Care per Chase Martinez PT     Subjective     Pt reports: better overall, still with pain as noted; has taken a pain pill; using walker on arrival  She was compliant with home exercise program.  Response to previous treatment: no c/o  Functional change: improving strength    Pain: 5/10  Location: left knee;      Objective     Left knee range of motion measures:   Flexion  113/115 degrees   Extension            0 degrees   Quad lag         -10 degrees        Kailee received therapeutic exercises to develop strength, ROM, and flexibility for 15 minutes including:  Bike x 7 minutes, full revolutions  Slant board bilateral calf stretch x 2 minutes   Hamstring stretch on step, 3x20 second hold   Swiss ball bilateral knee flexion stretching x 30 repetitions     Kailee participated in neuromuscular re-education activities to improve: Balance and Proprioception for  25 minutes . The following activities were included:  Double support squats total gym x 30 repetitions   Double support calf raises total gym x 20 repetitions   Sit to stand x 10 repetitions without BUE support 50%  Seated long arc quads x 20 repetitions   Double support leg presses 20 x 50# (not today)  Quad sets x 20 repetitions   Short arc quads x 20 repetitions   Straight leg  raises x 20 repetitions     Home Exercises Provided and Patient Education Provided     Education provided: continue current home exercise program, pain free     Written Home Exercises Provided: Patient instructed to cont prior HEP.  Exercises were reviewed and Kailee was able to demonstrate them prior to the end of the session.  Kailee demonstrated good  understanding of the education provided.     See EMR under Patient Instructions for exercises provided  9/20/2023 .    Assessment     Limited by dizziness with lying down; improving range of motion and quad strength as noted  Kailee Is progressing well towards her goals.   Pt prognosis is Excellent.     Pt will continue to benefit from skilled outpatient physical therapy to address the deficits listed in the problem list box on initial evaluation, provide pt/family education and to maximize pt's level of independence in the home and community environment.      Anticipated barriers to physical therapy: home exercise program compliance    Goals:  Short Term Goals: 4 weeks   Pt will improve range of motion 0-125 degrees   Increase quad lag to 0 degrees  Return to driving   Progress from walker to straight cane.      Long Term Goals: 6 weeks   Pt will be able to stoop and bend pain free   Return to yard work   Increase quad strength to 5/5     Plan     Plan of care Certification: 9/20/2023 to 11/01/2023 . 11 visits      Outpatient Physical Therapy 2 times weekly for 8weeks to include the following interventions: Neuromuscular Re-ed, Patient Education, and Therapeutic Exercise     Continue per Plan of Care and progress as pt able   Esther Guajardo, PTA  10/4/2023

## 2023-10-06 ENCOUNTER — CLINICAL SUPPORT (OUTPATIENT)
Dept: REHABILITATION | Facility: HOSPITAL | Age: 78
End: 2023-10-06
Payer: MEDICARE

## 2023-10-06 DIAGNOSIS — M25.662 DECREASED RANGE OF MOTION (ROM) OF LEFT KNEE: Primary | ICD-10-CM

## 2023-10-06 PROCEDURE — 97110 THERAPEUTIC EXERCISES: CPT | Mod: PN,CQ

## 2023-10-06 PROCEDURE — 97112 NEUROMUSCULAR REEDUCATION: CPT | Mod: PN,CQ

## 2023-10-06 NOTE — PROGRESS NOTES
Physical Therapy Treatment Note     Name: Kailee Malone  Clinic Number: [id number]    Therapy Diagnosis:   Encounter Diagnosis   Name Primary?    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  Response to previous treatment: no c/o  Functional change: improving strength    Pain: 5/10  Location: left knee    Objective     Left knee range of motion measures:   Flexion  115/120 degrees   Extension            0 degrees   Quad lag          - 8 degrees        Kailee received therapeutic exercises to develop strength, ROM, and flexibility for 15 minutes including:  Bike x 7 minutes, full revolutions  Slant board bilateral calf stretch x 2 minutes   Hamstring stretch on step, 3x20 second hold   Swiss ball bilateral knee flexion stretching x 30 repetitions     Kailee participated in neuromuscular re-education activities to improve: Balance and Proprioception for  20 minutes . The following activities were included:  Quad sets x 20 repetitions   Short arc quads x 20 repetitions   Straight leg raises x 20 repetitions   Sit to stand x 10 repetitions without BUE support 50%  Seated long arc quads x 20 repetitions     (Pt declined these today)  Double support squats total gym x 30 repetitions   Double support calf raises total gym x 20 repetitions   Double support leg presses 20 x 50#     Home  Exercises Provided and Patient Education Provided     Education provided: continue current home exercise program, pain free     Written Home Exercises Provided: Patient instructed to cont prior HEP.  Exercises were reviewed and Kailee was able to demonstrate them prior to the end of the session.  Kailee demonstrated good  understanding of the education provided.     See EMR under Patient Instructions for exercises provided  9/20/2023 .    Assessment     Improved flexion range of motion as noted; improving quad strength with decrease in quad lag by 2 degrees   Kailee Is progressing well towards her goals.   Pt prognosis is Excellent.     Pt will continue to benefit from skilled outpatient physical therapy to address the deficits listed in the problem list box on initial evaluation, provide pt/family education and to maximize pt's level of independence in the home and community environment.      Anticipated barriers to physical therapy: home exercise program compliance    Goals:  Short Term Goals: 4 weeks   Pt will improve range of motion 0-125 degrees   Increase quad lag to 0 degrees  Return to driving met  Progress from walker to straight cane.      Long Term Goals: 6 weeks   Pt will be able to stoop and bend pain free   Return to yard work   Increase quad strength to 5/5     Plan     Plan of care Certification: 9/20/2023 to 11/01/2023 . 11 visits      Outpatient Physical Therapy 2 times weekly for 8weeks to include the following interventions: Neuromuscular Re-ed, Patient Education, and Therapeutic Exercise     Continue per Plan of Care and progress as pt able   Esther Guajardo, PTA  10/6/2023

## 2023-10-09 ENCOUNTER — CLINICAL SUPPORT (OUTPATIENT)
Dept: REHABILITATION | Facility: HOSPITAL | Age: 78
End: 2023-10-09
Payer: MEDICARE

## 2023-10-09 DIAGNOSIS — M25.662 DECREASED RANGE OF MOTION (ROM) OF LEFT KNEE: Primary | ICD-10-CM

## 2023-10-09 PROCEDURE — 97110 THERAPEUTIC EXERCISES: CPT | Mod: PN

## 2023-10-09 PROCEDURE — 97112 NEUROMUSCULAR REEDUCATION: CPT | Mod: PN

## 2023-10-09 NOTE — PROGRESS NOTES
Physical Therapy Treatment Note     Name: Kailee Malone  Clinic Number: [id number]    Therapy Diagnosis:   Encounter Diagnosis   Name Primary?    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  Response to previous treatment: no c/o  Functional change: improving strength    Pain: 3/10  Location: left knee    Objective     Left knee range of motion measures:   Flexion  130 degrees   Extension           -3 degrees   Quad lag          - 6degrees        Kailee received therapeutic exercises to develop strength, ROM, and flexibility for 15 minutes including:  Bike x 7 minutes, full revolutions  Slant board bilateral calf stretch x 2 minutes   Hamstring stretch on step, 3x20 second hold   Swiss ball bilateral knee flexion stretching x 30 repetitions     Kailee participated in neuromuscular re-education activities to improve: Balance and Proprioception for  20 minutes . The following activities were included:  Quad sets x 20 repetitions   Short arc quads x 20 repetitions   Straight leg raises x 20 repetitions   Sit to stand x 10 repetitions without BUE support 50%  Seated long arc quads x 20 repetitions     (Pt declined these today)  Double support squats total gym x 30 repetitions   Double support calf raises total gym x 20 repetitions   Double support leg presses 20 x 50#     Home Exercises  Provided and Patient Education Provided     Education provided: continue current home exercise program, pain free     Written Home Exercises Provided: Patient instructed to cont prior HEP.  Exercises were reviewed and Kailee was able to demonstrate them prior to the end of the session.  Kailee demonstrated good  understanding of the education provided.     See EMR under Patient Instructions for exercises provided  9/20/2023 .    Assessment     Improved flexion range of motion as noted; improving quad strength with decrease in quad lag   Kailee Is progressing well towards her goals.   Pt prognosis is Excellent.     Pt will continue to benefit from skilled outpatient physical therapy to address the deficits listed in the problem list box on initial evaluation, provide pt/family education and to maximize pt's level of independence in the home and community environment.      Anticipated barriers to physical therapy: home exercise program compliance    Goals:  Short Term Goals: 4 weeks   Pt will improve range of motion 0-125 degrees   Increase quad lag to 0 degrees  Return to driving met  Progress from walker to straight cane.      Long Term Goals: 6 weeks   Pt will be able to stoop and bend pain free   Return to yard work   Increase quad strength to 5/5     Plan     Plan of care Certification: 9/20/2023 to 11/01/2023 . 11 visits      Outpatient Physical Therapy 2 times weekly for 8weeks to include the following interventions: Neuromuscular Re-ed, Patient Education, and Therapeutic Exercise    Plan of care has been reestablished with Carol Peña LPTA, Esther Guajardo LPTA, Cherelle Austin LPTA  and Briana HENSON.     Chase Martinez, PT  10/9/2023

## 2023-10-10 ENCOUNTER — OFFICE VISIT (OUTPATIENT)
Dept: NEUROLOGY | Facility: CLINIC | Age: 78
End: 2023-10-10
Payer: MEDICARE

## 2023-10-10 VITALS
OXYGEN SATURATION: 96 % | HEIGHT: 65 IN | BODY MASS INDEX: 32.32 KG/M2 | DIASTOLIC BLOOD PRESSURE: 80 MMHG | SYSTOLIC BLOOD PRESSURE: 124 MMHG | HEART RATE: 69 BPM | WEIGHT: 194 LBS

## 2023-10-10 DIAGNOSIS — R25.1 TREMORS OF NERVOUS SYSTEM: ICD-10-CM

## 2023-10-10 PROCEDURE — 1159F MED LIST DOCD IN RCRD: CPT | Mod: CPTII,,, | Performed by: NURSE PRACTITIONER

## 2023-10-10 PROCEDURE — 3288F PR FALLS RISK ASSESSMENT DOCUMENTED: ICD-10-PCS | Mod: CPTII,,, | Performed by: NURSE PRACTITIONER

## 2023-10-10 PROCEDURE — 1159F PR MEDICATION LIST DOCUMENTED IN MEDICAL RECORD: ICD-10-PCS | Mod: CPTII,,, | Performed by: NURSE PRACTITIONER

## 2023-10-10 PROCEDURE — 3079F PR MOST RECENT DIASTOLIC BLOOD PRESSURE 80-89 MM HG: ICD-10-PCS | Mod: CPTII,,, | Performed by: NURSE PRACTITIONER

## 2023-10-10 PROCEDURE — 3074F PR MOST RECENT SYSTOLIC BLOOD PRESSURE < 130 MM HG: ICD-10-PCS | Mod: CPTII,,, | Performed by: NURSE PRACTITIONER

## 2023-10-10 PROCEDURE — 3079F DIAST BP 80-89 MM HG: CPT | Mod: CPTII,,, | Performed by: NURSE PRACTITIONER

## 2023-10-10 PROCEDURE — 1160F PR REVIEW ALL MEDS BY PRESCRIBER/CLIN PHARMACIST DOCUMENTED: ICD-10-PCS | Mod: CPTII,,, | Performed by: NURSE PRACTITIONER

## 2023-10-10 PROCEDURE — 1160F RVW MEDS BY RX/DR IN RCRD: CPT | Mod: CPTII,,, | Performed by: NURSE PRACTITIONER

## 2023-10-10 PROCEDURE — 99213 PR OFFICE/OUTPT VISIT, EST, LEVL III, 20-29 MIN: ICD-10-PCS | Mod: S$PBB,,, | Performed by: NURSE PRACTITIONER

## 2023-10-10 PROCEDURE — 99213 OFFICE O/P EST LOW 20 MIN: CPT | Mod: S$PBB,,, | Performed by: NURSE PRACTITIONER

## 2023-10-10 PROCEDURE — 3074F SYST BP LT 130 MM HG: CPT | Mod: CPTII,,, | Performed by: NURSE PRACTITIONER

## 2023-10-10 PROCEDURE — 1125F PR PAIN SEVERITY QUANTIFIED, PAIN PRESENT: ICD-10-PCS | Mod: CPTII,,, | Performed by: NURSE PRACTITIONER

## 2023-10-10 PROCEDURE — 3288F FALL RISK ASSESSMENT DOCD: CPT | Mod: CPTII,,, | Performed by: NURSE PRACTITIONER

## 2023-10-10 PROCEDURE — 1101F PR PT FALLS ASSESS DOC 0-1 FALLS W/OUT INJ PAST YR: ICD-10-PCS | Mod: CPTII,,, | Performed by: NURSE PRACTITIONER

## 2023-10-10 PROCEDURE — 99215 OFFICE O/P EST HI 40 MIN: CPT | Mod: PBBFAC | Performed by: NURSE PRACTITIONER

## 2023-10-10 PROCEDURE — 1125F AMNT PAIN NOTED PAIN PRSNT: CPT | Mod: CPTII,,, | Performed by: NURSE PRACTITIONER

## 2023-10-10 PROCEDURE — 1101F PT FALLS ASSESS-DOCD LE1/YR: CPT | Mod: CPTII,,, | Performed by: NURSE PRACTITIONER

## 2023-10-10 RX ORDER — PROPRANOLOL HYDROCHLORIDE 40 MG/1
40 TABLET ORAL 3 TIMES DAILY
Qty: 90 TABLET | Refills: 11 | Status: SHIPPED | OUTPATIENT
Start: 2023-10-10 | End: 2024-10-09

## 2023-10-10 RX ORDER — PRIMIDONE 50 MG/1
TABLET ORAL
Qty: 90 TABLET | Refills: 3 | Status: SHIPPED | OUTPATIENT
Start: 2023-10-10

## 2023-10-10 NOTE — PROGRESS NOTES
Subjective:       Patient ID: Kailee Malone is a 78 y.o. female     Chief Complaint:    No chief complaint on file.       Allergies:  Povidone-iodine, Povidone-iodine-ethyl alcohol, and Penicillins    Current Medications:    Outpatient Encounter Medications as of 10/10/2023   Medication Sig Dispense Refill    alendronate (FOSAMAX) 70 MG tablet Take 70 mg by mouth every 7 days.      busPIRone (BUSPAR) 5 MG Tab Take 5 mg by mouth 2 (two) times daily.      colestipoL (COLESTID) 1 gram Tab Take 1 tablet (1 g total) by mouth 2 (two) times daily. 180 tablet 1    colestipoL (COLESTID) 1 gram Tab 2 tablets Orally Once a day for 30 day(s)      ergocalciferol (ERGOCALCIFEROL) 50,000 unit Cap Take 1 capsule (50,000 Units total) by mouth every 30 days. Take one capsule weekly for 12 weeks then reduce to one capsule monthly 3 capsule 3    EScitalopram oxalate (LEXAPRO) 20 MG tablet Take 20 mg by mouth.      estradioL (ESTRACE) 0.01 % (0.1 mg/gram) vaginal cream Place 1 g vaginally once daily. (Patient not taking: Reported on 1/18/2023) 42.5 g 6    FLUoxetine 20 MG capsule Take 20 mg by mouth once daily.      HYDROmorphone (DILAUDID) 2 MG tablet Take 2 mg by mouth every 4 to 6 hours as needed.      hydrOXYzine HCL (ATARAX) 25 MG tablet Take 25 mg by mouth every 6 (six) hours as needed.      ketoconazole (NIZORAL) 2 % cream APPLY TO AFFECTED AREAS TWICE DAILY UNTIL CLEAR      ketoconazole (NIZORAL) 2 % shampoo APPLY ON BACK LEAVE ON FOR 5 MINUTES THEN RINSE, 2 TO 3 TIMES WEEKLY      lansoprazole (PREVACID) 30 MG capsule Take 1 capsule (30 mg total) by mouth once daily. 90 capsule 3    oxyCODONE-acetaminophen (PERCOCET) 5-325 mg per tablet TAKE 1 TO 2 TABLETS BY MOUTH EVERY 4 TO 6 HOURS AS NEEDED FOR PAIN      predniSONE (DELTASONE) 5 MG tablet Take 5 mg by mouth.      primidone (MYSOLINE) 50 MG Tab Take one tablet at bedtime 90 tablet 3    propranoloL (INDERAL) 60 MG tablet Take one tablet daily, monitor heart rate and  blood pressure before taking 90 tablet 3    SENNA PLUS 8.6-50 mg per tablet TAKE ONE TO TWO TABLETS BY MOUTH EVERYDAY WHILE TAKING NARCOTIC PAIN MEDS TO PREVENT CONSTIPATION      sodium chloride 1,000 mg TbSO oral tablet Take 1,000 mg by mouth 2 (two) times daily.      [] sulfamethoxazole-trimethoprim 800-160mg (BACTRIM DS) 800-160 mg Tab Take 1 tablet by mouth 2 (two) times daily. for 3 days 6 tablet 0    traMADoL (ULTRAM) 50 mg tablet 1 tablet as needed Orally every 8 hours for 7 days      triamterene-hydrochlorothiazide 37.5-25 mg (MAXZIDE-25) 37.5-25 mg per tablet Take 1 tablet by mouth once daily.       No facility-administered encounter medications on file as of 10/10/2023.       History of Present Illness  79 y/o female following in neurology for tremors.    Tremor onset around .  Has had worsening tremor in past due to anxiety and depression symptoms as well.  On primidone 25mg at night and propranolol 60mg daily.  In past higher dosing of propranolol caused bradycardia.  Today she is reporting generally feeling weak, more dizzy when getting up.  Will try her with lower dosing of propranolol to 40mg daily to ensure this is not adding to this.    MRI of the brain with and without contrast done on 2022 showed no acute abnormality.               Review of Systems  Review of Systems   Constitutional:  Negative for diaphoresis and fever.   HENT:  Negative for congestion, hearing loss and tinnitus.    Eyes:  Negative for blurred vision, double vision, photophobia, discharge and redness.   Respiratory:  Negative for cough and shortness of breath.    Cardiovascular:  Negative for chest pain.   Gastrointestinal:  Negative for abdominal pain, nausea and vomiting.   Musculoskeletal:  Negative for back pain, joint pain, myalgias and neck pain.   Skin:  Negative for itching and rash.   Neurological:  Positive for tremors. Negative for dizziness, sensory change, speech change, focal weakness,  seizures, loss of consciousness, weakness and headaches.   Psychiatric/Behavioral:  Negative for depression, hallucinations and memory loss. The patient does not have insomnia.    All other systems reviewed and are negative.   Objective:     NEUROLOGICAL EXAMINATION:     MENTAL STATUS   Oriented to person, place, and time.   Registration: recalls 3 of 3 objects. Recall at 5 minutes: recalls 3 of 3 objects.   Attention: normal. Concentration: normal.   Speech: speech is normal   Level of consciousness: alert  Knowledge: good and consistent with education.   Normal comprehension.     CRANIAL NERVES     CN II   Visual fields full to confrontation.   Visual acuity: normal  Right visual field deficit: none  Left visual field deficit: none     CN III, IV, VI   Pupils are equal, round, and reactive to light.  Extraocular motions are normal.   Right pupil: Size: 3 mm. Shape: regular. Reactivity: brisk. Consensual response: intact. Accommodation: intact.   Left pupil: Size: 3 mm. Shape: regular. Reactivity: brisk. Consensual response: intact. Accommodation: intact.   CN III: no CN III palsy  CN VI: no CN VI palsy  Nystagmus: none   Diplopia: none  Upgaze: normal  Downgaze: normal  Conjugate gaze: present  Vestibulo-ocular reflex: present    CN V   Facial sensation intact.   Right facial sensation deficit: none  Left facial sensation deficit: none  Right corneal reflex: normal  Left corneal reflex: normal    CN VII   Facial expression full, symmetric.   Right facial weakness: none  Left facial weakness: none  Right taste: normal  Left taste: normal    CN VIII   CN VIII normal.   Hearing: intact    CN IX, X   CN IX normal.   CN X normal.   Palate: symmetric    CN XI   CN XI normal.   Right sternocleidomastoid strength: normal  Left sternocleidomastoid strength: normal  Right trapezius strength: normal  Left trapezius strength: normal    CN XII   CN XII normal.   Tongue: not atrophic  Fasciculations: absent  Tongue deviation:  none    MOTOR EXAM   Muscle bulk: normal  Overall muscle tone: normal  Right arm tone: normal  Left arm tone: normal  Right arm pronator drift: absent  Left arm pronator drift: absent  Right leg tone: normal  Left leg tone: normal    Strength   Right neck flexion: 5/5  Left neck flexion: 55  Right neck extension: /5  Left neck extension:   Right deltoid: /  Left deltoid: /  Right biceps: 5  Left biceps: 5  Right triceps:   Left triceps:   Right wrist flexion:   Left wrist flexion: /  Right wrist extension:   Left wrist extension:   Right interossei:   Left interossei:   Right iliopsoas:   Left iliopsoas:   Right quadriceps:   Left quadriceps:   Right hamstrin/5  Left hamstrin/5  Right anterior tibial:   Left anterior tibial:   Right posterior tibial:   Left posterior tibial:   Right gastroc:   Left gastroc:     REFLEXES     Reflexes   Right brachioradialis: 2+  Left brachioradialis: 2+  Right biceps: 2+  Left biceps: 2+  Right triceps: 2+  Left triceps: 2+  Right patellar: 2+  Left patellar: 2+  Right achilles: 2+  Left achilles: 2+  Right plantar: normal  Left plantar: normal  Right Leung: absent  Left Leung: absent  Right ankle clonus: absent  Left ankle clonus: absent  Right pendular knee jerk: absent  Left pendular knee jerk: absent    SENSORY EXAM   Light touch normal.   Right arm light touch: normal  Left arm light touch: normal  Right leg light touch: normal  Left leg light touch: normal  Vibration normal.   Right arm vibration: normal  Left arm vibration: normal  Right leg vibration: normal  Left leg vibration: normal  Proprioception normal.   Right arm proprioception: normal  Left arm proprioception: normal  Right leg proprioception: normal  Left leg proprioception: normal  Pinprick normal.   Right arm pinprick: normal  Left arm pinprick: normal  Right leg pinprick: normal  Left leg pinprick: normal  Graphesthesia:  normal  Romberg: negative  Stereognosis: normal    GAIT AND COORDINATION     Gait  Gait: normal     Coordination   Finger to nose coordination: normal  Heel to shin coordination: normal  Tandem walking coordination: normal    Tremor   Resting tremor: absent  Intention tremor: absent  Action tremor: absent     Physical Exam  Vitals and nursing note reviewed.   Constitutional:       Appearance: Normal appearance.   HENT:      Head: Normocephalic.   Eyes:      Extraocular Movements: Extraocular movements intact and EOM normal.      Pupils: Pupils are equal, round, and reactive to light.   Cardiovascular:      Rate and Rhythm: Normal rate and regular rhythm.   Pulmonary:      Effort: Pulmonary effort is normal.      Breath sounds: Normal breath sounds.   Musculoskeletal:         General: No swelling or tenderness. Normal range of motion.      Cervical back: Normal range of motion and neck supple.      Right lower leg: No edema.      Left lower leg: No edema.   Skin:     General: Skin is warm and dry.      Coloration: Skin is not jaundiced.      Findings: No rash.   Neurological:      General: No focal deficit present.      Mental Status: She is alert and oriented to person, place, and time.      GCS: GCS eye subscore is 4. GCS verbal subscore is 5. GCS motor subscore is 6.      Cranial Nerves: No cranial nerve deficit.      Sensory: No sensory deficit.      Motor: Motor function is intact. No weakness.      Coordination: Coordination is intact. Coordination normal. Finger-Nose-Finger Test, Heel to Shin Test and Romberg Test normal.      Gait: Gait is intact. Gait and tandem walk normal.      Deep Tendon Reflexes: Reflexes normal.      Reflex Scores:       Tricep reflexes are 2+ on the right side and 2+ on the left side.       Bicep reflexes are 2+ on the right side and 2+ on the left side.       Brachioradialis reflexes are 2+ on the right side and 2+ on the left side.       Patellar reflexes are 2+ on the right side  and 2+ on the left side.       Achilles reflexes are 2+ on the right side and 2+ on the left side.  Psychiatric:         Mood and Affect: Mood normal.         Speech: Speech normal.         Behavior: Behavior normal.        Assessment:     Problem List Items Addressed This Visit    None         Primary Diagnosis and ICD10  No primary diagnosis found.    Plan:     There are no Patient Instructions on file for this visit.    There are no discontinued medications.    Requested Prescriptions      No prescriptions requested or ordered in this encounter       No orders of the defined types were placed in this encounter.

## 2023-10-10 NOTE — PATIENT INSTRUCTIONS
Continue the propranolol, but decrease dosing to 40mg daily  Continue the primidone 25mg at night, but can increase to 50mg if desired

## 2023-10-11 ENCOUNTER — CLINICAL SUPPORT (OUTPATIENT)
Dept: REHABILITATION | Facility: HOSPITAL | Age: 78
End: 2023-10-11
Payer: MEDICARE

## 2023-10-11 DIAGNOSIS — M25.662 DECREASED RANGE OF MOTION (ROM) OF LEFT KNEE: Primary | ICD-10-CM

## 2023-10-11 DIAGNOSIS — R29.898 DECREASED STRENGTH OF LOWER EXTREMITY: ICD-10-CM

## 2023-10-11 PROCEDURE — 97110 THERAPEUTIC EXERCISES: CPT | Mod: PN,CQ

## 2023-10-11 PROCEDURE — 97112 NEUROMUSCULAR REEDUCATION: CPT | Mod: PN,CQ

## 2023-10-11 NOTE — PROGRESS NOTES
"  Physical Therapy Treatment Note     Name: Kailee Malone  Clinic Number: [id number]    Therapy Diagnosis:   Encounter Diagnoses   Name Primary?    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 The neurologist changed some of my meds and my shaking is better.  She was compliant with home exercise program.  Response to previous treatment: no c/o  Functional change: improving strength    Pain: 3/10  Location: left knee    Objective     Left knee range of motion measures:   Flexion  118 degrees   Extension  -3 degrees   Quad lag  - 6 degrees      Kailee received therapeutic exercises to develop strength, ROM, and flexibility for 25 minutes including:  Bike x 7 minutes, full revolutions  Slant board bilateral calf stretch x 2 minutes   Hamstring stretch on step, 3x20 second hold   Swiss ball bilateral knee flexion stretching x 20 repetitions   Double support squats total gym x 30 repetitions   Double support calf raises total gym x 20 repetitions   Double support leg presses 20 x 50#   Left forward step ups 6" x 10    Kailee participated in neuromuscular re-education activities to improve: Balance and Proprioception for  20 minutes . The following activities were included:  Quad sets x 20 repetitions   Short arc quads x 20 repetitions   Straight leg raises x 20 repetitions        Home Exercises Provided and Patient Education Provided     Education " provided: continue current home exercise program, pain free     Written Home Exercises Provided: Patient instructed to cont prior HEP.  Exercises were reviewed and Kailee was able to demonstrate them prior to the end of the session.  Kailee demonstrated good  understanding of the education provided.     See EMR under Patient Instructions for exercises provided  9/20/2023 .    Assessment   Patient noted to have pitting edema in left lower extremity, instructed to elevate above heart as much as possible. Also suggested covering scab and donning OLU hose to help with edema.  Kailee Is progressing well towards her goals.   Pt prognosis is Excellent.     Pt will continue to benefit from skilled outpatient physical therapy to address the deficits listed in the problem list box on initial evaluation, provide pt/family education and to maximize pt's level of independence in the home and community environment.      Anticipated barriers to physical therapy: home exercise program compliance    Goals:  Short Term Goals: 4 weeks   Pt will improve range of motion 0-125 degrees   Increase quad lag to 0 degrees  Return to driving met  Progress from walker to straight cane.      Long Term Goals: 6 weeks   Pt will be able to stoop and bend pain free   Return to yard work   Increase quad strength to 5/5     Plan     Plan of care Certification: 9/20/2023 to 11/01/2023 . 11 visits      Outpatient Physical Therapy 2 times weekly for 8weeks to include the following interventions: Neuromuscular Re-ed, Patient Education, and Therapeutic Exercise    Plan of care has been reestablished with Carol Peña LPTA, Esther Guajardo LPTA, Cherelle Austin LPTA  and Briana Mahmood LPTA.     Lucero Peña, PTA  10/11/2023

## 2023-10-16 ENCOUNTER — CLINICAL SUPPORT (OUTPATIENT)
Dept: REHABILITATION | Facility: HOSPITAL | Age: 78
End: 2023-10-16
Payer: MEDICARE

## 2023-10-16 DIAGNOSIS — M25.662 DECREASED RANGE OF MOTION (ROM) OF LEFT KNEE: Primary | ICD-10-CM

## 2023-10-16 PROCEDURE — 97112 NEUROMUSCULAR REEDUCATION: CPT | Mod: PN,CQ

## 2023-10-16 PROCEDURE — 97110 THERAPEUTIC EXERCISES: CPT | Mod: PN,CQ

## 2023-10-16 NOTE — PROGRESS NOTES
"  Physical Therapy Treatment Note     Name: Kailee Malone  Clinic Number: [id number]    Therapy Diagnosis:   Encounter Diagnosis   Name Primary?    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   She was compliant with home exercise program.  Response to previous treatment: no c/o  Functional change: improving strength    Pain: 3/10  Location: left knee    Objective     Left knee range of motion measures:   Flexion  118 degrees   Extension  -3 degrees   Quad lag  - 6 degrees      Kailee received therapeutic exercises to develop strength, ROM, and flexibility for 25 minutes including:  Bike x 7 minutes, full revolutions  Slant board bilateral calf stretch x 2 minutes   Hamstring stretch on step, 3x20 second hold   Swiss ball bilateral knee flexion stretching x 20 repetitions   Double support squats total gym x 20 repetitions   Double support calf raises total gym x 20 repetitions   Double support leg presses 20 x 50#   Left forward step ups 6" x 10    Kailee participated in neuromuscular re-education activities to improve: Balance and Proprioception for  20 minutes . The following activities were included:  Quad sets x 20 repetitions in standing with red TB resistance  Short arc quads x 20 repetitions with Russian   Straight leg raises x 20 repetitions with Burmese current   "     Home Exercises Provided and Patient Education Provided     Education provided: continue current home exercise program, pain free     Written Home Exercises Provided: Patient instructed to cont prior HEP.  Exercises were reviewed and Kailee was able to demonstrate them prior to the end of the session.  Kailee demonstrated good  understanding of the education provided.     See EMR under Patient Instructions for exercises provided  9/20/2023 .    Assessment   Patient having dizziness (x 3 weeks ) with room spinning after position changes.  Decreased edema noted from last visit per pt. And PTA.  Kailee Is progressing well towards her goals.   Pt prognosis is Excellent.     Pt will continue to benefit from skilled outpatient physical therapy to address the deficits listed in the problem list box on initial evaluation, provide pt/family education and to maximize pt's level of independence in the home and community environment.      Anticipated barriers to physical therapy: home exercise program compliance    Goals:  Short Term Goals: 4 weeks   Pt will improve range of motion 0-125 degrees   Increase quad lag to 0 degrees  Return to driving met  Progress from walker to straight cane.      Long Term Goals: 6 weeks   Pt will be able to stoop and bend pain free   Return to yard work   Increase quad strength to 5/5     Plan     Plan of care Certification: 9/20/2023 to 11/01/2023 . 11 visits      Outpatient Physical Therapy 2 times weekly for 8weeks to include the following interventions: Neuromuscular Re-ed, Patient Education, and Therapeutic Exercise    Plan of care has been reestablished with Carol Peña LPTA, Esther Guajardo LPTA, Cherelle Austin LPTA  and Briana Mahmood LPTA.     Cherelle Austin, PTA  10/16/2023

## 2023-10-18 ENCOUNTER — CLINICAL SUPPORT (OUTPATIENT)
Dept: REHABILITATION | Facility: HOSPITAL | Age: 78
End: 2023-10-18
Payer: MEDICARE

## 2023-10-18 DIAGNOSIS — R29.898 DECREASED STRENGTH OF LOWER EXTREMITY: ICD-10-CM

## 2023-10-18 DIAGNOSIS — M25.662 DECREASED RANGE OF MOTION (ROM) OF LEFT KNEE: Primary | ICD-10-CM

## 2023-10-18 PROCEDURE — 97110 THERAPEUTIC EXERCISES: CPT | Mod: PN,CQ

## 2023-10-18 PROCEDURE — 97112 NEUROMUSCULAR REEDUCATION: CPT | Mod: PN,CQ

## 2023-10-18 NOTE — PROGRESS NOTES
"  Physical Therapy Treatment Note     Name: Kailee Malone  Clinic Number: [id number]    Therapy Diagnosis:   Encounter Diagnoses   Name Primary?    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 I had a bad night with my knee.  She was compliant with home exercise program.  Response to previous treatment: no c/o  Functional change: improving strength    Pain: 3/10  Location: left knee    Objective     Left knee range of motion measures:   Flexion  120 degrees   Extension  -6 degrees   Quad lag  - 9 degrees      Kailee received therapeutic exercises to develop strength, ROM, and flexibility for 30 minutes including:  Bike x 7 minutes, full revolutions seat level 2   Swiss ball bilateral knee flexion stretching x 20 repetitions   Double support squats total gym x 20 repetitions   Double support calf raises total gym x 20 repetitions   Double support leg presses 20 x 55#   Double support calf presses 20 x 55#  Left forward step ups 6" x 10    Kailee participated in neuromuscular re-education activities to improve: Balance and Proprioception for  20 minutes . The following activities were included:  Quad sets x 20 with electrical stimulation   Terminal knee extension withes x 20  Hamstrings stretch on step x 5  Slant board x 2'     Home Exercises Provided and Patient Education Provided "     Education provided: continue current home exercise program, pain free     Written Home Exercises Provided: Patient instructed to cont prior HEP.  Exercises were reviewed and Kailee was able to demonstrate them prior to the end of the session.  Kailee demonstrated good  understanding of the education provided.     See EMR under Patient Instructions for exercises provided  9/20/2023 .    Assessment   Patient had difficulty with extension range of motion this treatment, able to increase weight with leg presses  Kailee Is progressing well towards her goals.   Pt prognosis is Excellent.     Pt will continue to benefit from skilled outpatient physical therapy to address the deficits listed in the problem list box on initial evaluation, provide pt/family education and to maximize pt's level of independence in the home and community environment.      Anticipated barriers to physical therapy: home exercise program compliance    Goals:  Short Term Goals: 4 weeks   Pt will improve range of motion 0-125 degrees   Increase quad lag to 0 degrees  Return to driving met  Progress from walker to straight cane.      Long Term Goals: 6 weeks   Pt will be able to stoop and bend pain free   Return to yard work   Increase quad strength to 5/5     Plan     Plan of care Certification: 9/20/2023 to 11/01/2023 . 11 visits      Outpatient Physical Therapy 2 times weekly for 8weeks to include the following interventions: Neuromuscular Re-ed, Patient Education, and Therapeutic Exercise    Plan of care has been reestablished with Carol HENSON, Esther Guajardo LPTA, Cherelle Austin LPTA  and Briana HENSON.     Lucero Peña, PTA  10/18/2023

## 2023-10-23 ENCOUNTER — CLINICAL SUPPORT (OUTPATIENT)
Dept: REHABILITATION | Facility: HOSPITAL | Age: 78
End: 2023-10-23
Payer: MEDICARE

## 2023-10-23 DIAGNOSIS — M25.662 DECREASED RANGE OF MOTION (ROM) OF LEFT KNEE: Primary | ICD-10-CM

## 2023-10-23 PROCEDURE — 97110 THERAPEUTIC EXERCISES: CPT | Mod: PN,CQ

## 2023-10-23 PROCEDURE — 97112 NEUROMUSCULAR REEDUCATION: CPT | Mod: PN,CQ

## 2023-10-23 NOTE — PROGRESS NOTES
Physical Therapy Treatment Note     Name: Kailee Malone  Clinic Number: [id number]    Therapy Diagnosis:   Encounter Diagnosis   Name Primary?    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  She was compliant with home exercise program.  Response to previous treatment: no c/o  Functional change: improving strength    Pain: 3/10  Location: left knee    Objective     Left knee range of motion measures:   Flexion  120 degrees   Extension  -3 degrees   Quad lag  - 6 degrees      Kailee received therapeutic exercises to develop strength, ROM, and flexibility for 30 minutes including:  Bike x 9 minutes, full revolutions seat level 2   Swiss ball bilateral knee flexion stretching x 20 repetitions   Double support squats total gym x 20 repetitions   Double support calf raises total gym x 20 repetitions   Double support leg presses 20 x 55#   Double support calf presses 20 x 55#  Static weight loads x 15  Sitting hip flexion left x 10    Kailee participated in neuromuscular re-education activities to improve: Balance and Proprioception for  15  minutes . The following activities were included:  Quad sets x 20 with electrical stimulation   Terminal knee extension withes 20 x 4#  Slant board x 2'     Home Exercises Provided and Patient Education Provided     Education provided: continue current home exercise program, pain free     Written  Home Exercises Provided: Patient instructed to cont prior HEP.  Exercises were reviewed and Kailee was able to demonstrate them prior to the end of the session.  Kailee demonstrated good  understanding of the education provided.     See EMR under Patient Instructions for exercises provided  9/20/2023 .    Assessment   Patient had improved extension range of motion this treatment   Kailee Is progressing well towards her goals.   Pt prognosis is Excellent.     Pt will continue to benefit from skilled outpatient physical therapy to address the deficits listed in the problem list box on initial evaluation, provide pt/family education and to maximize pt's level of independence in the home and community environment.      Anticipated barriers to physical therapy: home exercise program compliance    Goals:  Short Term Goals: 4 weeks   Pt will improve range of motion 0-125 degrees   Increase quad lag to 0 degrees  Return to driving met  Progress from walker to straight cane.      Long Term Goals: 6 weeks   Pt will be able to stoop and bend pain free   Return to yard work   Increase quad strength to 5/5     Plan     Plan of care Certification: 9/20/2023 to 12/31/2023 . 21 visits    there exercises, neuromuscular only .     Outpatient Physical Therapy 2 times weekly for 8weeks to include the following interventions: Neuromuscular Re-ed, Patient Education, and Therapeutic Exercise    Plan of care has been reestablished with Carol Peña LPTA, Esther Guajardo LPTA, Cherelle Austin LPTA  and Briana Mahmood LPTA.     Lucero Peña, PTA  10/23/2023

## 2023-10-23 NOTE — PLAN OF CARE
Physical Therapy Treatment Note      Name: Kailee Malone  Clinic Number: [id number]     Therapy Diagnosis:        Encounter Diagnosis   Name Primary?    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  She was compliant with home exercise program.  Response to previous treatment: no c/o  Functional change: improving strength     Pain: 3/10  Location: left knee     Objective      Left knee range of motion measures:   Flexion            120 degrees   Extension         -3 degrees   Quad lag          - 6 degrees          Kailee received therapeutic exercises to develop strength, ROM, and flexibility for 30 minutes including:  Bike x 9 minutes, full revolutions seat level 2   Swiss ball bilateral knee flexion stretching x 20 repetitions   Double support squats total gym x 20 repetitions   Double support calf raises total gym x 20 repetitions   Double support leg presses 20 x 55#   Double support calf presses 20 x 55#  Static weight loads x 15  Sitting hip flexion left x 10     Kailee participated in neuromuscular re-education activities to improve: Balance and Proprioception for  15  minutes . The following activities were included:  Quad sets x 20 with electrical stimulation   Terminal knee extension withes 20 x 4#  Slant board x 2'     Home Exercises Provided and Patient Education Provided      Education provided:  continue current home exercise program, pain free      Written Home Exercises Provided: Patient instructed to cont prior HEP.  Exercises were reviewed and Kailee was able to demonstrate them prior to the end of the session.  Kailee demonstrated good  understanding of the education provided.      See EMR under Patient Instructions for exercises provided  9/20/2023 .     Assessment   Patient had improved extension range of motion this treatment   Kailee Is progressing well towards her goals.   Pt prognosis is Excellent.      Pt will continue to benefit from skilled outpatient physical therapy to address the deficits listed in the problem list box on initial evaluation, provide pt/family education and to maximize pt's level of independence in the home and community environment.      Anticipated barriers to physical therapy: home exercise program compliance     Goals:  Short Term Goals: 4 weeks   Pt will improve range of motion 0-125 degrees   Increase quad lag to 0 degrees  Return to driving met  Progress from walker to straight cane.      Long Term Goals: 6 weeks   Pt will be able to stoop and bend pain free   Return to yard work   Increase quad strength to 5/5      Plan   Reasons for Recertification of Therapy: cont PT     Plan     Updated Certification Period: 10/23/2023 to 12/31/2023  21 total visits  [id number] and [id number] only   Recommended Treatment Plan: 2 times per week for 8 weeks: Manual Therapy, Neuromuscular Re-ed, Patient Education, Therapeutic Exercise, and estim to quads   Other Recommendations: cont PT     Chase Martinez, PT  10/23/2023      I CERTIFY THE NEED FOR THESE SERVICES FURNISHED UNDER THIS PLAN OF TREATMENT AND WHILE UNDER MY CARE.    Physician's comments:      Physician's Signature: ___________________________________________________

## 2023-10-25 ENCOUNTER — CLINICAL SUPPORT (OUTPATIENT)
Dept: REHABILITATION | Facility: HOSPITAL | Age: 78
End: 2023-10-25
Payer: MEDICARE

## 2023-10-25 DIAGNOSIS — M25.662 DECREASED RANGE OF MOTION (ROM) OF LEFT KNEE: Primary | ICD-10-CM

## 2023-10-25 PROCEDURE — 97112 NEUROMUSCULAR REEDUCATION: CPT | Mod: PN

## 2023-10-25 PROCEDURE — 97110 THERAPEUTIC EXERCISES: CPT | Mod: PN

## 2023-10-25 NOTE — PROGRESS NOTES
Physical Therapy Treatment Note     Name: Kailee Malnoe  Clinic Number: [id number]    Therapy Diagnosis:   Encounter Diagnosis   Name Primary?    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    She was compliant with home exercise program.  Response to previous treatment: no c/o  Functional change: improving strength    Pain: 3/10  Location: left knee    Objective     Left knee range of motion measures:   Flexion  125 degrees   Extension  -3 degrees   Quad lag  - 4 degrees      Kailee received therapeutic exercises to develop strength, ROM, and flexibility for 30 minutes including:  Bike x 9 minutes, full revolutions seat level 2   Swiss ball bilateral knee flexion stretching x 20 repetitions   Double support squats total gym x 20 repetitions   Double support calf raises total gym x 20 repetitions   Double support leg presses 20 x 55#   Double support calf presses 20 x 55#  Static weight loads x 15  Sitting hip flexion left x 10    Kailee participated in neuromuscular re-education activities to improve: Balance and Proprioception for  15  minutes . The following activities were included:  Quad sets x 20 with electrical stimulation   Terminal knee extension withes 20 x 4#  Slant board x 2'     Home Exercises Provided and Patient Education Provided     Education provided: continue current home exercise program, pain free     Written Home Exercises Provided: Patient instructed to cont prior HEP.  Exercises were  reviewed and Kailee was able to demonstrate them prior to the end of the session.  Kailee demonstrated good  understanding of the education provided.     See EMR under Patient Instructions for exercises provided  9/20/2023 .    Assessment   Patient had increased terminal knee extension range of motion this treatment   Kailee Is progressing well towards her goals.   Pt prognosis is Excellent.     Pt will continue to benefit from skilled outpatient physical therapy to address the deficits listed in the problem list box on initial evaluation, provide pt/family education and to maximize pt's level of independence in the home and community environment.      Anticipated barriers to physical therapy: home exercise program compliance    Goals:  Short Term Goals: 4 weeks   Pt will improve range of motion 0-125 degrees   Increase quad lag to 0 degrees  Return to driving met  Progress from walker to straight cane.      Long Term Goals: 6 weeks   Pt will be able to stoop and bend pain free   Return to yard work   Increase quad strength to 5/5     Plan     Plan of care Certification: 9/20/2023 to 12/31/2023 . 21 visits    there exercises, neuromuscular only .     Outpatient Physical Therapy 2 times weekly for 8weeks to include the following interventions: Neuromuscular Re-ed, Patient Education, and Therapeutic Exercise    Plan of care has been reestablished with Carol Peña LPTA, Esther Guajardo LPTA, Cherelle Austin LPTA  and Briana Mahmood LPTA.     Chase Martinez, PT  10/25/2023

## 2023-10-31 ENCOUNTER — CLINICAL SUPPORT (OUTPATIENT)
Dept: REHABILITATION | Facility: HOSPITAL | Age: 78
End: 2023-10-31
Payer: MEDICARE

## 2023-10-31 DIAGNOSIS — M25.662 DECREASED RANGE OF MOTION (ROM) OF LEFT KNEE: Primary | ICD-10-CM

## 2023-10-31 PROCEDURE — 97110 THERAPEUTIC EXERCISES: CPT | Mod: PN

## 2023-10-31 PROCEDURE — 97112 NEUROMUSCULAR REEDUCATION: CPT | Mod: PN

## 2023-10-31 NOTE — PROGRESS NOTES
Physical Therapy Treatment Note     Name: Kailee Malone  Clinic Number: [id number]    Therapy Diagnosis:   Encounter Diagnosis   Name Primary?    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    She was compliant with home exercise program.  Response to previous treatment: no c/o  Functional change: improving strength    Pain: 3/10  Location: left knee    Objective     Left knee range of motion measures:   Flexion  125 degrees   Extension  -3 degrees   Quad lag  - 3degrees      Kailee received therapeutic exercises to develop strength, ROM, and flexibility for 30 minutes including:  Bike x 9 minutes, full revolutions seat level 2   Swiss ball bilateral knee flexion stretching x 20 repetitions   Double support squats total gym x 20 repetitions   Double support calf raises total gym x 20 repetitions   Double support leg presses 20 x 70  Double support calf presses 20 x 70  Static weight loads x 15  Sitting hip flexion left x 10    Kailee participated in neuromuscular re-education activities to improve: Balance and Proprioception for  15  minutes . The following activities were included:  Quad sets x 20 with electrical stimulation   Terminal knee extension withes 20 x 4#  Slant board x 2'     Home Exercises Provided and Patient Education Provided     Education provided: continue current home exercise program, pain free     Written Home Exercises Provided: Patient instructed to cont prior HEP.  Exercises were reviewed  and Kailee was able to demonstrate them prior to the end of the session.  Kailee demonstrated good  understanding of the education provided.     See EMR under Patient Instructions for exercises provided  9/20/2023 .    Assessment   Patient had increased terminal knee extension range of motion this treatment   Kailee Is progressing well towards her goals.   Pt prognosis is Excellent.     Pt will continue to benefit from skilled outpatient physical therapy to address the deficits listed in the problem list box on initial evaluation, provide pt/family education and to maximize pt's level of independence in the home and community environment.      Anticipated barriers to physical therapy: home exercise program compliance    Goals:  Short Term Goals: 4 weeks   Pt will improve range of motion 0-125 degrees   Increase quad lag to 0 degrees  Return to driving met  Progress from walker to straight cane.      Long Term Goals: 6 weeks   Pt will be able to stoop and bend pain free   Return to yard work   Increase quad strength to 5/5     Plan     Plan of care Certification: 9/20/2023 to 12/31/2023 . 21 visits    there exercises, neuromuscular only .     Outpatient Physical Therapy 2 times weekly for 8weeks to include the following interventions: Neuromuscular Re-ed, Patient Education, and Therapeutic Exercise    Plan of care has been reestablished with Carol Peña LPTA, Esther Guajardo LPTA, Cherelle Austin LPTA  and Briana Mahmood LPTA.     Chase Martinez, PT  10/31/2023

## 2023-11-03 ENCOUNTER — CLINICAL SUPPORT (OUTPATIENT)
Dept: REHABILITATION | Facility: HOSPITAL | Age: 78
End: 2023-11-03
Payer: MEDICARE

## 2023-11-03 ENCOUNTER — OFFICE VISIT (OUTPATIENT)
Dept: FAMILY MEDICINE | Facility: CLINIC | Age: 78
End: 2023-11-03
Payer: MEDICARE

## 2023-11-03 VITALS
BODY MASS INDEX: 31.82 KG/M2 | HEIGHT: 65 IN | HEART RATE: 71 BPM | SYSTOLIC BLOOD PRESSURE: 132 MMHG | WEIGHT: 191 LBS | RESPIRATION RATE: 18 BRPM | DIASTOLIC BLOOD PRESSURE: 80 MMHG | OXYGEN SATURATION: 97 % | TEMPERATURE: 98 F

## 2023-11-03 DIAGNOSIS — R29.898 DECREASED STRENGTH INVOLVING KNEE JOINT: ICD-10-CM

## 2023-11-03 DIAGNOSIS — N30.01 ACUTE CYSTITIS WITH HEMATURIA: Primary | ICD-10-CM

## 2023-11-03 DIAGNOSIS — M25.662 DECREASED RANGE OF MOTION (ROM) OF LEFT KNEE: Primary | ICD-10-CM

## 2023-11-03 DIAGNOSIS — M79.621 PAIN OF RIGHT UPPER ARM: ICD-10-CM

## 2023-11-03 PROBLEM — Z11.59 SCREENING FOR VIRAL DISEASE: Status: RESOLVED | Noted: 2022-01-18 | Resolved: 2023-11-03

## 2023-11-03 PROBLEM — J04.0 LARYNGITIS: Status: RESOLVED | Noted: 2022-06-29 | Resolved: 2023-11-03

## 2023-11-03 PROCEDURE — 99213 PR OFFICE/OUTPT VISIT, EST, LEVL III, 20-29 MIN: ICD-10-PCS | Mod: ,,, | Performed by: STUDENT IN AN ORGANIZED HEALTH CARE EDUCATION/TRAINING PROGRAM

## 2023-11-03 PROCEDURE — 87186 SC STD MICRODIL/AGAR DIL: CPT | Mod: ,,, | Performed by: CLINICAL MEDICAL LABORATORY

## 2023-11-03 PROCEDURE — 1101F PT FALLS ASSESS-DOCD LE1/YR: CPT | Mod: ,,, | Performed by: STUDENT IN AN ORGANIZED HEALTH CARE EDUCATION/TRAINING PROGRAM

## 2023-11-03 PROCEDURE — 97110 THERAPEUTIC EXERCISES: CPT | Mod: PN,CQ

## 2023-11-03 PROCEDURE — 87077 CULTURE AEROBIC IDENTIFY: CPT | Mod: ,,, | Performed by: CLINICAL MEDICAL LABORATORY

## 2023-11-03 PROCEDURE — 99213 OFFICE O/P EST LOW 20 MIN: CPT | Mod: ,,, | Performed by: STUDENT IN AN ORGANIZED HEALTH CARE EDUCATION/TRAINING PROGRAM

## 2023-11-03 PROCEDURE — 87086 CULTURE, URINE: ICD-10-PCS | Mod: ,,, | Performed by: CLINICAL MEDICAL LABORATORY

## 2023-11-03 PROCEDURE — 87077 CULTURE, URINE: ICD-10-PCS | Mod: ,,, | Performed by: CLINICAL MEDICAL LABORATORY

## 2023-11-03 PROCEDURE — 87186 CULTURE, URINE: ICD-10-PCS | Mod: ,,, | Performed by: CLINICAL MEDICAL LABORATORY

## 2023-11-03 PROCEDURE — 3288F FALL RISK ASSESSMENT DOCD: CPT | Mod: ,,, | Performed by: STUDENT IN AN ORGANIZED HEALTH CARE EDUCATION/TRAINING PROGRAM

## 2023-11-03 PROCEDURE — 3079F DIAST BP 80-89 MM HG: CPT | Mod: ,,, | Performed by: STUDENT IN AN ORGANIZED HEALTH CARE EDUCATION/TRAINING PROGRAM

## 2023-11-03 PROCEDURE — 1101F PR PT FALLS ASSESS DOC 0-1 FALLS W/OUT INJ PAST YR: ICD-10-PCS | Mod: ,,, | Performed by: STUDENT IN AN ORGANIZED HEALTH CARE EDUCATION/TRAINING PROGRAM

## 2023-11-03 PROCEDURE — 87086 URINE CULTURE/COLONY COUNT: CPT | Mod: ,,, | Performed by: CLINICAL MEDICAL LABORATORY

## 2023-11-03 PROCEDURE — 3075F PR MOST RECENT SYSTOLIC BLOOD PRESS GE 130-139MM HG: ICD-10-PCS | Mod: ,,, | Performed by: STUDENT IN AN ORGANIZED HEALTH CARE EDUCATION/TRAINING PROGRAM

## 2023-11-03 PROCEDURE — 97112 NEUROMUSCULAR REEDUCATION: CPT | Mod: PN,CQ

## 2023-11-03 PROCEDURE — 1159F PR MEDICATION LIST DOCUMENTED IN MEDICAL RECORD: ICD-10-PCS | Mod: ,,, | Performed by: STUDENT IN AN ORGANIZED HEALTH CARE EDUCATION/TRAINING PROGRAM

## 2023-11-03 PROCEDURE — 3075F SYST BP GE 130 - 139MM HG: CPT | Mod: ,,, | Performed by: STUDENT IN AN ORGANIZED HEALTH CARE EDUCATION/TRAINING PROGRAM

## 2023-11-03 PROCEDURE — 3288F PR FALLS RISK ASSESSMENT DOCUMENTED: ICD-10-PCS | Mod: ,,, | Performed by: STUDENT IN AN ORGANIZED HEALTH CARE EDUCATION/TRAINING PROGRAM

## 2023-11-03 PROCEDURE — 3079F PR MOST RECENT DIASTOLIC BLOOD PRESSURE 80-89 MM HG: ICD-10-PCS | Mod: ,,, | Performed by: STUDENT IN AN ORGANIZED HEALTH CARE EDUCATION/TRAINING PROGRAM

## 2023-11-03 PROCEDURE — 1159F MED LIST DOCD IN RCRD: CPT | Mod: ,,, | Performed by: STUDENT IN AN ORGANIZED HEALTH CARE EDUCATION/TRAINING PROGRAM

## 2023-11-03 RX ORDER — SULFAMETHOXAZOLE AND TRIMETHOPRIM 800; 160 MG/1; MG/1
1 TABLET ORAL 2 TIMES DAILY
Qty: 6 TABLET | Refills: 0 | Status: SHIPPED | OUTPATIENT
Start: 2023-11-03 | End: 2023-11-06

## 2023-11-03 RX ORDER — ROPINIROLE 0.25 MG/1
TABLET, FILM COATED ORAL
COMMUNITY
Start: 2023-10-19

## 2023-11-03 RX ORDER — GABAPENTIN 300 MG/1
CAPSULE ORAL
COMMUNITY
Start: 2023-11-02

## 2023-11-03 RX ORDER — MECLIZINE HYDROCHLORIDE 25 MG/1
TABLET ORAL
COMMUNITY
Start: 2023-10-19

## 2023-11-03 NOTE — PROGRESS NOTES
Physical Therapy Treatment Note     Name: Kailee Malone  Clinic Number: [id number]    Therapy Diagnosis:   Encounter Diagnoses   Name Primary?    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 I have a UTI that also bothering me, I'm going to see family after this.  She was compliant with home exercise program.  Response to previous treatment: no c/o  Functional change: improving strength    Pain: 3/10  Location: left knee    Objective     Left knee range of motion measures:   Flexion  125 degrees   Extension  -2 degrees   Quad lag  - 2 degrees      Kailee received therapeutic exercises to develop strength, ROM, and flexibility for 30 minutes including:  Bike x 8 minutes, full revolutions lowest level  Swiss ball bilateral knee flexion stretching x 20 repetitions   Double support squats total gym x 20 repetitions   Double support calf raises total gym x 20 repetitions   Left single support leg presses 20 x 45#  Right knee flexion stretch on top step x 10  Top step toe touches bilateral x 10  Standing calf raises x 15    Kailee participated in neuromuscular re-education activities to improve: Balance and Proprioception for  15  minutes . The following activities were included:  Quad sets x 20 with electrical stimulation   Terminal knee extension withes 20 x 4#  Slant board x 2'     Home  Exercises Provided and Patient Education Provided     Education provided: continue current home exercise program, pain free     Written Home Exercises Provided: Patient instructed to cont prior HEP.  Exercises were reviewed and Kailee was able to demonstrate them prior to the end of the session.  Kailee demonstrated good  understanding of the education provided.     See EMR under Patient Instructions for exercises provided  9/20/2023 .    Assessment   Patient noted to not be performing adequate hip flexion to clear steps when performing step exercises, encouraged to focus on hip flexion to increase safety with steps  Kailee Is progressing well towards her goals  Pt prognosis is Excellent.     Pt will continue to benefit from skilled outpatient physical therapy to address the deficits listed in the problem list box on initial evaluation, provide pt/family education and to maximize pt's level of independence in the home and community environment.      Anticipated barriers to physical therapy: home exercise program compliance    Goals:  Short Term Goals: 4 weeks   Pt will improve range of motion 0-125 degrees   Increase quad lag to 0 degrees  Return to driving met  Progress from walker to straight cane. -met     Long Term Goals: 6 weeks   Pt will be able to stoop and bend pain free   Return to yard work   Increase quad strength to 5/5     Plan     Plan of care Certification: 9/20/2023 to 12/31/2023 . 21 visits    there exercises, neuromuscular only .     Outpatient Physical Therapy 2 times weekly for 8weeks to include the following interventions: Neuromuscular Re-ed, Patient Education, and Therapeutic Exercise    Plan of care has been reestablished with Carol HENSON, Esther Guajardo LPTA, Cherelle Austin LPTA  and Briana Mahmood LPTA.     Lucero Peña, PTA  11/3/2023

## 2023-11-03 NOTE — PROGRESS NOTES
Progress Note     ALEKS DE JESUS MD   [address]  [place] [address]     PATIENT NAME: Kailee Malone  : 1945  DATE: 11/3/23  MRN: [id number]      Billing Provider: ALEKS DE JESUS MD  Level of Service:   Patient PCP Information       Provider PCP Type    Caden Bradford MD General                Urinary Tract Infection (X2days/No itching/A lot of burning ) and Health Maintenance (Pt had the flu vaccine at  office/Pt declined shingles vaccine/Pt stated she had the pneumococcal vaccine at  office)      SUBJECTIVE:     Kailee Malone is a 78 y.o.female who presents to clinic for Urinary Tract Infection (X2days/No itching/A lot of burning ) and Health Maintenance (Pt had the flu vaccine at  office/Pt declined shingles vaccine/Pt stated she had the pneumococcal vaccine at  office)    Patient last had a UTI on 2023.  Culture grew Pseudomonas and Enterococcus faecalis.  Patient notes that she is been having burning with increased urinary frequency the past 2-3 days.  Her symptoms worsened drastically yesterday.  Patient has been afebrile.  Patient denies nausea, vomiting, or flank pain.    Patient is also having intermittent right upper arm pain.  He notes the pain is in the back of her arm.  She denies any overlying rash, erythema, or weakness.  Patient notes some pain with certain movements.  Patient has been evaluated already for this pain.  She was given prednisone without any significant improvement in symptoms.  She reports she is had symptoms since she was hospitalized following a knee replacement in September.  Of note, patient's PCP is Dr. Mayorga at [place].  Patient also sees Dr. Caden Bradford for acute concerns.     All other pertinent review of systems negative. Please see HPI for details.     Past Medical History:  has a past medical history of Acute gastric ulcer without hemorrhage or perforation (2022),  Acute superficial gastritis without hemorrhage (02/03/2022), Kirkpatrick's esophagus without dysplasia (02/04/2022), Diverticula, colon (10/04/2021), HH (hiatus hernia) (02/03/2022), History of colon polyps (10/04/2021), Hypertension, and Screening for colon cancer (10/04/2021).   Past Surgical History:  has a past surgical history that includes RT WRIST; Hysterectomy; Breast surgery; Arthroscopy of knee (Right, 05/24/2021); Knee arthroscopy w/ meniscectomy (Right, 05/24/2021); Breast biopsy; and Oophorectomy.  Family History: family history includes Breast cancer in her maternal aunt and mother.  Social History:  reports that she has never smoked. She has been exposed to tobacco smoke. She has never used smokeless tobacco. She reports that she does not drink alcohol and does not use drugs.  Allergies:   Review of patient's allergies indicates:   Allergen Reactions    Povidone-iodine Other (See Comments)    Povidone-iodine-ethyl alcohol Other (See Comments)     BURN    Penicillins Rash and Hives         Current Outpatient Medications:     alendronate (FOSAMAX) 70 MG tablet, Take 70 mg by mouth every 7 days., Disp: , Rfl:     busPIRone (BUSPAR) 5 MG Tab, Take 5 mg by mouth 2 (two) times daily., Disp: , Rfl:     colestipoL (COLESTID) 1 gram Tab, Take 1 tablet (1 g total) by mouth 2 (two) times daily., Disp: 180 tablet, Rfl: 1    colestipoL (COLESTID) 1 gram Tab, 2 tablets Orally Once a day for 30 day(s), Disp: , Rfl:     ergocalciferol (ERGOCALCIFEROL) 50,000 unit Cap, Take 1 capsule (50,000 Units total) by mouth every 30 days. Take one capsule weekly for 12 weeks then reduce to one capsule monthly, Disp: 3 capsule, Rfl: 3    EScitalopram oxalate (LEXAPRO) 20 MG tablet, Take 20 mg by mouth., Disp: , Rfl:     gabapentin (NEURONTIN) 300 MG capsule, , Disp: , Rfl:     HYDROmorphone (DILAUDID) 2 MG tablet, Take 2 mg by mouth every 4 to 6 hours as needed., Disp: , Rfl:     hydrOXYzine HCL (ATARAX) 25 MG tablet, Take 25 mg by  mouth every 6 (six) hours as needed., Disp: , Rfl:     ketoconazole (NIZORAL) 2 % shampoo, APPLY ON BACK LEAVE ON FOR 5 MINUTES THEN RINSE, 2 TO 3 TIMES WEEKLY, Disp: , Rfl:     lansoprazole (PREVACID) 30 MG capsule, Take 1 capsule (30 mg total) by mouth once daily., Disp: 90 capsule, Rfl: 3    meclizine (ANTIVERT) 25 mg tablet, , Disp: , Rfl:     propranoloL (INDERAL) 40 MG tablet, Take 1 tablet (40 mg total) by mouth 3 (three) times daily., Disp: 90 tablet, Rfl: 11    rOPINIRole (REQUIP) 0.25 MG tablet, , Disp: , Rfl:     SENNA PLUS 8.6-50 mg per tablet, TAKE ONE TO TWO TABLETS BY MOUTH EVERYDAY WHILE TAKING NARCOTIC PAIN MEDS TO PREVENT CONSTIPATION, Disp: , Rfl:     sodium chloride 1,000 mg TbSO oral tablet, Take 1,000 mg by mouth 2 (two) times daily., Disp: , Rfl:     traMADoL (ULTRAM) 50 mg tablet, 1 tablet as needed Orally every 8 hours for 7 days, Disp: , Rfl:     triamterene-hydrochlorothiazide 37.5-25 mg (MAXZIDE-25) 37.5-25 mg per tablet, Take 1 tablet by mouth once daily., Disp: , Rfl:     estradioL (ESTRACE) 0.01 % (0.1 mg/gram) vaginal cream, Place 1 g vaginally once daily. (Patient not taking: Reported on 1/18/2023), Disp: 42.5 g, Rfl: 6    ketoconazole (NIZORAL) 2 % cream, APPLY TO AFFECTED AREAS TWICE DAILY UNTIL CLEAR, Disp: , Rfl:     oxyCODONE-acetaminophen (PERCOCET) 5-325 mg per tablet, TAKE 1 TO 2 TABLETS BY MOUTH EVERY 4 TO 6 HOURS AS NEEDED FOR PAIN, Disp: , Rfl:     predniSONE (DELTASONE) 5 MG tablet, Take 5 mg by mouth., Disp: , Rfl:     primidone (MYSOLINE) 50 MG Tab, Take one tablet at bedtime (Patient not taking: Reported on 11/3/2023), Disp: 90 tablet, Rfl: 3    sulfamethoxazole-trimethoprim 800-160mg (BACTRIM DS) 800-160 mg Tab, Take 1 tablet by mouth 2 (two) times daily. for 3 days, Disp: 6 tablet, Rfl: 0   OBJECTIVE:     Vital Signs   /80 (BP Location: Left arm, Patient Position: Sitting, BP Method: Large (Manual))   Pulse 71   Temp 97.9 °F (36.6 °C) (Temporal)   Resp 18    "Ht 5' 5" (1.651 m)   Wt 86.6 kg (191 lb)   SpO2 97%   BMI 31.78 kg/m²     Physical Exam  Constitutional:       General: She is not in acute distress.     Appearance: Normal appearance. She is not ill-appearing, toxic-appearing or diaphoretic.   HENT:      Head: Normocephalic and atraumatic.   Eyes:      Extraocular Movements: Extraocular movements intact.      Pupils: Pupils are equal, round, and reactive to light.   Cardiovascular:      Rate and Rhythm: Normal rate and regular rhythm.      Pulses: Normal pulses.      Heart sounds: Normal heart sounds. No murmur heard.     No friction rub. No gallop.   Pulmonary:      Effort: Pulmonary effort is normal. No respiratory distress.      Breath sounds: No wheezing, rhonchi or rales.   Abdominal:      General: Abdomen is flat.      Palpations: Abdomen is soft.      Tenderness: There is no abdominal tenderness. There is no right CVA tenderness, left CVA tenderness, guarding or rebound.   Musculoskeletal:         General: Normal range of motion.      Cervical back: Normal range of motion.      Comments: Mild tenderness to palpation in fatty tissue of right upper arm.  No nodules or masses appreciated.  Normal range of motion elbow and shoulder.  No warmth, no erythema, no wounds, no rashes.   Skin:     General: Skin is warm and dry.      Capillary Refill: Capillary refill takes less than 2 seconds.   Neurological:      General: No focal deficit present.      Mental Status: She is alert.   Psychiatric:         Mood and Affect: Mood normal.         Behavior: Behavior normal.         ASSESSMENT/PLAN:     1. Acute cystitis with hematuria  -     Urine culture  -     sulfamethoxazole-trimethoprim 800-160mg (BACTRIM DS) 800-160 mg Tab; Take 1 tablet by mouth 2 (two) times daily. for 3 days  Dispense: 6 tablet; Refill: 0    Patient's UA concerning for UTI.  We will obtain urine culture.  We will prescribe Bactrim.  We will adjust regimen if needed pending urine culture. " Emergency precautions discussed. Patient to FU if symptoms worsen or fail to improve. Patient verbalized understanding.     2. Pain of right upper arm  Offered additional workup for patient's concern.  Patient declines at this time and notes she will follow up with her PCP for further evaluation.    Follow up if symptoms worsen or fail to improve.      ALEKS DE JESUS MD  11/03/2023    Due to voice recognition software, sound alike and misspelled words may be contained in the documentation.

## 2023-11-05 LAB — UA COMPLETE W REFLEX CULTURE PNL UR: ABNORMAL

## 2023-11-06 ENCOUNTER — CLINICAL SUPPORT (OUTPATIENT)
Dept: REHABILITATION | Facility: HOSPITAL | Age: 78
End: 2023-11-06
Payer: MEDICARE

## 2023-11-06 DIAGNOSIS — M25.662 DECREASED RANGE OF MOTION (ROM) OF LEFT KNEE: Primary | ICD-10-CM

## 2023-11-06 DIAGNOSIS — R29.898 DECREASED STRENGTH INVOLVING KNEE JOINT: ICD-10-CM

## 2023-11-06 PROCEDURE — 97112 NEUROMUSCULAR REEDUCATION: CPT | Mod: PN,CQ

## 2023-11-06 PROCEDURE — 97110 THERAPEUTIC EXERCISES: CPT | Mod: PN,CQ

## 2023-11-06 NOTE — PROGRESS NOTES
Please let patient know her culture grew E. Coli. It is sensitive to bactrim. Please ensure she is feeling better.

## 2023-11-06 NOTE — PROGRESS NOTES
"  Physical Therapy Treatment Note     Name: Kailee Malone  Clinic Number: [id number]    Therapy Diagnosis:   Encounter Diagnoses   Name Primary?    Decreased range of motion (ROM) of left knee Yes    Decreased strength involving knee joint      Physician: Dawood Jay MD    Visit Date: 11/6/2023    Physician Orders: PT Eval and Treat    Medical Diagnosis from Referral: left knee total 9/5/2023  Evaluation Date: 9/20/2023  Updated Plan of Care Due : 12/31/2023  Authorization Period Expiration: [employer]  12/31/23  Plan of Care Expiration:   Visit # / Visits authorized: 15/ 21  PTA VISIT #2    Time In: 1015  Time Out: 1100  Total Billable Time: 45 minutes    Precautions: Standard    Subjective     Pt reports: I'm feeling better this am  She was compliant with home exercise program.  Response to previous treatment: no c/o  Functional change: ongoing    Pain: 3/10  Location: left knee    Objective     Left knee range of motion measures:   Flexion  125 degrees   Extension  -2 degrees   Quad lag  - 4 degrees      Kailee received therapeutic exercises to develop strength, ROM, and flexibility for 30 minutes including:  Bike x 8 minutes, full revolutions lowest level  Swiss ball bilateral knee flexion stretching x 20 repetitions   Double support squats total gym x 20 repetitions   Double support calf raises total gym x 20 repetitions   Left single support leg presses 20 x 45#  Standing calf raises x 15  Right forward step ups 6" x 15    Kailee participated in neuromuscular re-education activities to improve: Balance and Proprioception for  15  minutes . The following activities were included:  Quad sets x 20 with electrical stimulation   Terminal knee extension withes 20 x 4#  Slant board x 2'     Home Exercises Provided and Patient Education Provided     Education provided: continue current home exercise program, pain free     Written Home Exercises Provided: Patient instructed to cont prior HEP.  Exercises were " reviewed and Kailee was able to demonstrate them prior to the end of the session.  Kailee demonstrated good  understanding of the education provided.     See EMR under Patient Instructions for exercises provided  9/20/2023 .    Assessment   Patient having difficulty with last few degrees of extension range of motion   Kailee Is progressing well towards her goals  Pt prognosis is Excellent.     Pt will continue to benefit from skilled outpatient physical therapy to address the deficits listed in the problem list box on initial evaluation, provide pt/family education and to maximize pt's level of independence in the home and community environment.      Anticipated barriers to physical therapy: home exercise program compliance    Goals:  Short Term Goals: 4 weeks   Pt will improve range of motion 0-125 degrees   Increase quad lag to 0 degrees  Return to driving met  Progress from walker to straight cane. -met     Long Term Goals: 6 weeks   Pt will be able to stoop and bend pain free   Return to yard work   Increase quad strength to 5/5     Plan     Plan of care Certification: 9/20/2023 to 12/31/2023 . 21 visits    there exercises, neuromuscular only .     Outpatient Physical Therapy 2 times weekly for 8weeks to include the following interventions: Neuromuscular Re-ed, Patient Education, and Therapeutic Exercise    Plan of care has been reestablished with Carol Peña LPTA, Esther Guajardo LPTA, Cherelle Austin LPTA  and Briana Mahmood LPTA.     Lucero Peña, PTA  11/6/2023

## 2023-11-08 ENCOUNTER — CLINICAL SUPPORT (OUTPATIENT)
Dept: REHABILITATION | Facility: HOSPITAL | Age: 78
End: 2023-11-08
Payer: MEDICARE

## 2023-11-08 DIAGNOSIS — R29.898 DECREASED STRENGTH INVOLVING KNEE JOINT: ICD-10-CM

## 2023-11-08 DIAGNOSIS — M25.662 DECREASED RANGE OF MOTION (ROM) OF LEFT KNEE: Primary | ICD-10-CM

## 2023-11-08 PROCEDURE — 97110 THERAPEUTIC EXERCISES: CPT | Mod: PN

## 2023-11-08 PROCEDURE — 97112 NEUROMUSCULAR REEDUCATION: CPT | Mod: PN

## 2023-11-08 NOTE — PROGRESS NOTES
"  Physical Therapy Treatment Note     Name: Kailee Malone  Clinic Number: [id number]    Therapy Diagnosis:   Encounter Diagnoses   Name Primary?    Decreased range of motion (ROM) of left knee Yes    Decreased strength involving knee joint      Physician: Dawood Jay MD    Visit Date: 11/8/2023    Physician Orders: PT Eval and Treat    Medical Diagnosis from Referral: left knee total 9/5/2023  Evaluation Date: 9/20/2023  Updated Plan of Care Due : 12/31/2023  Authorization Period Expiration: [employer]  12/31/23  Plan of Care Expiration:   Visit # / Visits authorized: 16/ 21  PTA VISIT     Time In: 1015  Time Out: 1100  Total Billable Time: 45 minutes    Precautions: Standard    Subjective     Pt reports: left knee is feeling stiff   She was compliant with home exercise program.  Response to previous treatment: no c/o  Functional change: ongoing    Pain: 3/10  Location: left knee    Objective     Left knee range of motion measures:   Flexion  125 degrees   Extension  -2 degrees   Quad lag  - 3 degrees      Kailee received therapeutic exercises to develop strength, ROM, and flexibility for 30 minutes including:  Bike x 8 minutes, full revolutions lowest level  Swiss ball bilateral knee flexion stretching x 20 repetitions   Double support squats total gym x 20 repetitions   Double support calf raises total gym x 20 repetitions   Left single support leg presses 20 x 45#  Standing calf raises x 15  Right forward step ups 6" x 15    Kailee participated in neuromuscular re-education activities to improve: Balance and Proprioception for  15  minutes . The following activities were included:  Quad sets x 20 with electrical stimulation   Terminal knee extension withes 20 x 4#  Slant board x 2'     Home Exercises Provided and Patient Education Provided     Education provided: continue current home exercise program, pain free     Written Home Exercises Provided: Patient instructed to cont prior HEP.  Exercises were " reviewed and Kailee was able to demonstrate them prior to the end of the session.  Kailee demonstrated good  understanding of the education provided.     See EMR under Patient Instructions for exercises provided  9/20/2023 .    Assessment   Patient having difficulty with last few degrees of extension range of motion   Kailee Is progressing well towards her goals  Pt prognosis is Excellent.     Pt will continue to benefit from skilled outpatient physical therapy to address the deficits listed in the problem list box on initial evaluation, provide pt/family education and to maximize pt's level of independence in the home and community environment.      Anticipated barriers to physical therapy: home exercise program compliance    Goals:  Short Term Goals: 4 weeks   Pt will improve range of motion 0-125 degrees   Increase quad lag to 0 degrees  Return to driving met  Progress from walker to straight cane. -met     Long Term Goals: 6 weeks   Pt will be able to stoop and bend pain free   Return to yard work   Increase quad strength to 5/5     Plan     Plan of care Certification: 9/20/2023 to 12/31/2023 . 21 visits    there exercises, neuromuscular only .     Outpatient Physical Therapy 2 times weekly for 8weeks to include the following interventions: Neuromuscular Re-ed, Patient Education, and Therapeutic Exercise    Plan of care has been reestablished with Carol Peña LPTA, Eshter Guajardo LPTA, Cherelle Austin LPTA  and Briana Mahmood LPTA.     Chase Martinez, PT  11/8/2023

## 2023-11-10 ENCOUNTER — PATIENT OUTREACH (OUTPATIENT)
Dept: ADMINISTRATIVE | Facility: HOSPITAL | Age: 78
End: 2023-11-10

## 2023-11-10 NOTE — LETTER
AUTHORIZATION FOR RELEASE OF   CONFIDENTIAL INFORMATION    Dear [facility],    We are seeing Kailee Malone, date of birth 1945, in the clinic at No Department Specified. Caden Bradford MD is the patient's PCP. Kailee Malone has an outstanding lab/procedure at the time we reviewed her chart. In order to help keep her health information updated, she has authorized us to request the following medical record(s):        (  )  MAMMOGRAM                                      (  )  COLONOSCOPY      (  )  PAP SMEAR                                          (  )  OUTSIDE LAB RESULTS     (  )  DEXA SCAN                                          (  )  EYE EXAM            (  )  FOOT EXAM                                          (X)  ENTIRE RECORD     (  )  OUTSIDE IMMUNIZATIONS                 (  )  _______________         Please fax records to [facility] Coordinator, Yasmin Milligan, [phone].     If you have any questions, please contact [phone].          Patient Name: Kailee Malone  : 1945  Patient Phone #: [phone]

## 2023-11-10 NOTE — LETTER
AUTHORIZATION FOR RELEASE OF   CONFIDENTIAL INFORMATION    Dear  Medical Records,    We are seeing Kailee Malone, date of birth 1945, in the clinic at No Department Specified. Caden Bradford MD is the patient's PCP. Kailee Malone has an outstanding lab/procedure at the time we reviewed her chart. In order to help keep her health information updated, she has authorized us to request the following medical record(s):        (  )  MAMMOGRAM                                      (  )  COLONOSCOPY      (  )  PAP SMEAR                                          (  )  OUTSIDE LAB RESULTS     (  )  DEXA SCAN                                          (  )  EYE EXAM            (  )  FOOT EXAM                                          (X)  ENTIRE RECORD     (  )  OUTSIDE IMMUNIZATIONS                 (  )  _______________         Please fax records to [facility]inator, Yasmin Milligan, [phone].     If you have any questions, please contact [phone].          Patient Name: Kailee Malone  : 1945  Patient Phone #: [phone]

## 2023-11-10 NOTE — LETTER
AUTHORIZATION FOR RELEASE OF   CONFIDENTIAL INFORMATION    Dear [facility],    We are seeing Kailee Malone, date of birth 1945, in the clinic at No Department Specified. Caden Bradford MD is the patient's PCP. Kailee Malone has an outstanding lab/procedure at the time we reviewed her chart. In order to help keep her health information updated, she has authorized us to request the following medical record(s):        (  )  MAMMOGRAM                                      (  )  COLONOSCOPY      (  )  PAP SMEAR                                          (  )  OUTSIDE LAB RESULTS     (  )  DEXA SCAN                                          (  )  EYE EXAM            (  )  FOOT EXAM                                          (X)  ENTIRE RECORD     (  )  OUTSIDE IMMUNIZATIONS                 (  )  _______________         Please fax records to [facility]inator, Yasmin Milligan, [phone].     If you have any questions, please contact [phone].          Patient Name: Kailee Malone  : 1945  Patient Phone #: [phone]

## 2023-11-13 ENCOUNTER — CLINICAL SUPPORT (OUTPATIENT)
Dept: REHABILITATION | Facility: HOSPITAL | Age: 78
End: 2023-11-13
Payer: MEDICARE

## 2023-11-13 DIAGNOSIS — R29.898 DECREASED STRENGTH INVOLVING KNEE JOINT: ICD-10-CM

## 2023-11-13 DIAGNOSIS — M25.662 DECREASED RANGE OF MOTION (ROM) OF LEFT KNEE: Primary | ICD-10-CM

## 2023-11-13 PROCEDURE — 97110 THERAPEUTIC EXERCISES: CPT | Mod: PN,CQ

## 2023-11-13 PROCEDURE — 97112 NEUROMUSCULAR REEDUCATION: CPT | Mod: PN,CQ

## 2023-11-13 NOTE — PROGRESS NOTES
Physical Therapy Treatment Note     Name: Kailee Malone  Clinic Number: [id number]    Therapy Diagnosis:   Encounter Diagnoses   Name Primary?    Decreased range of motion (ROM) of left knee Yes    Decreased strength involving knee joint      Physician: Dawood Jay MD    Visit Date: 11/13/2023    Physician Orders: PT Eval and Treat    Medical Diagnosis from Referral: left knee total 9/5/2023  Evaluation Date: 9/20/2023  Updated Plan of Care Due : 12/31/2023  Authorization Period Expiration: [employer]  12/31/23  Plan of Care Expiration:   Visit # / Visits authorized: 17/ 21  PTA VISIT # 1    Time In: 1015  Time Out: 1100  Total Billable Time: 45 minutes    Precautions: Standard    Subjective     Pt reports: I didn't sleep much last night, I had to take a pain pill this am. I think it could be the weather.  She was compliant with home exercise program.  Response to previous treatment: no c/o  Functional change: ongoing    Pain: 8/10  Location: left knee    Objective     Left knee range of motion measures:   Flexion  125 degrees   Extension  -2 degrees   Quad lag  - 2 degrees      Kailee received therapeutic exercises to develop strength, ROM, and flexibility for 30 minutes including:  Bike x 6 minutes, full revolutions lowest level  Swiss ball bilateral knee flexion stretching x 20 repetitions   Double support squats total gym x 20 repetitions   Double support calf raises total gym x 20 repetitions   Double support leg presses 20 x 70#  Double support calf presses 15 x 70#      Kailee participated in neuromuscular re-education activities to improve: Balance and Proprioception for  15  minutes . The following activities were included:  Quad sets x 20 with electrical stimulation   Terminal knee extension  x 20  Slant board x 2'     Home Exercises Provided and Patient Education Provided     Education provided: continue current home exercise program, pain free     Written Home Exercises Provided: Patient instructed  to cont prior HEP.  Exercises were reviewed and Kailee was able to demonstrate them prior to the end of the session.  Kailee demonstrated good  understanding of the education provided.     See EMR under Patient Instructions for exercises provided  9/20/2023 .    Assessment   Patient voicing decreased pain 6/10 after treatment, request to not perform single support leg presses this session due to pain.  Kailee Is progressing well towards her goals  Pt prognosis is Excellent.     Pt will continue to benefit from skilled outpatient physical therapy to address the deficits listed in the problem list box on initial evaluation, provide pt/family education and to maximize pt's level of independence in the home and community environment.      Anticipated barriers to physical therapy: home exercise program compliance    Goals:  Short Term Goals: 4 weeks   Pt will improve range of motion 0-125 degrees   Increase quad lag to 0 degrees  Return to driving met  Progress from walker to straight cane. -met     Long Term Goals: 6 weeks   Pt will be able to stoop and bend pain free   Return to yard work   Increase quad strength to 5/5     Plan     Plan of care Certification: 9/20/2023 to 12/31/2023 . 21 visits    there exercises, neuromuscular only .     Outpatient Physical Therapy 2 times weekly for 8weeks to include the following interventions: Neuromuscular Re-ed, Patient Education, and Therapeutic Exercise    Plan of care has been reestablished with Carol HENSON, Esther Guajardo LPTA, Cherelle HENSON  and Briana HENSON.     Lucero Peña, PTA  11/13/2023

## 2023-11-16 ENCOUNTER — CLINICAL SUPPORT (OUTPATIENT)
Dept: REHABILITATION | Facility: HOSPITAL | Age: 78
End: 2023-11-16
Payer: MEDICARE

## 2023-11-16 DIAGNOSIS — R29.898 DECREASED STRENGTH INVOLVING KNEE JOINT: ICD-10-CM

## 2023-11-16 DIAGNOSIS — M25.662 DECREASED RANGE OF MOTION (ROM) OF LEFT KNEE: Primary | ICD-10-CM

## 2023-11-16 PROCEDURE — 97112 NEUROMUSCULAR REEDUCATION: CPT | Mod: PN

## 2023-11-16 PROCEDURE — 97110 THERAPEUTIC EXERCISES: CPT | Mod: PN

## 2023-11-16 NOTE — PROGRESS NOTES
Physical Therapy Treatment Note     Name: Kailee Malone  Clinic Number: [id number]    Therapy Diagnosis:   Encounter Diagnoses   Name Primary?    Decreased range of motion (ROM) of left knee Yes    Decreased strength involving knee joint      Physician: Dawood Jay MD    Visit Date: 11/16/2023    Physician Orders: PT Eval and Treat    Medical Diagnosis from Referral: left knee total 9/5/2023  Evaluation Date: 9/20/2023  Updated Plan of Care Due : 12/31/2023  Authorization Period Expiration: [employer]  12/31/23  Plan of Care Expiration:   Visit # / Visits authorized: 18/ 21  PTA VISIT # 1    Time In: 1015  Time Out: 1100  Total Billable Time: 45 minutes    Precautions: Standard    Subjective     Pt reports: I am doing great   She was compliant with home exercise program.  Response to previous treatment: no c/o  Functional change: ongoing    Pain: 2/10  Location: left knee    Objective     Left knee range of motion measures:   Flexion  125 degrees   Extension  -0 degrees   Quad lag  - 0 degrees      Kailee received therapeutic exercises to develop strength, ROM, and flexibility for 30 minutes including:  Bike x 6 minutes, full revolutions lowest level  Swiss ball bilateral knee flexion stretching x 20 repetitions   Double support squats total gym x 20 repetitions   Double support calf raises total gym x 20 repetitions   Double support leg presses 20 x 70#  Double support calf presses 15 x 70#      Kailee participated in neuromuscular re-education activities to improve: Balance and Proprioception for  15  minutes . The following activities were included:  Quad sets x 20 with electrical stimulation   Terminal knee extension  x 20  Slant board x 2'     Home Exercises Provided and Patient Education Provided     Education provided: continue current home exercise program, pain free     Written Home Exercises Provided: Patient instructed to cont prior HEP.  Exercises were reviewed and Kailee was able to demonstrate  them prior to the end of the session.  Kailee demonstrated good  understanding of the education provided.     See EMR under Patient Instructions for exercises provided  9/20/2023 .    Assessment   Patient voicing decreased pain 6/10 after treatment, request to not perform single support leg presses this session due to pain.  Kailee Is progressing well towards her goals  Pt prognosis is Excellent.     Pt will continue to benefit from skilled outpatient physical therapy to address the deficits listed in the problem list box on initial evaluation, provide pt/family education and to maximize pt's level of independence in the home and community environment.      Anticipated barriers to physical therapy: home exercise program compliance    Goals:  Short Term Goals: 4 weeks   Pt will improve range of motion 0-125 degrees   Increase quad lag to 0 degrees  Return to driving met  Progress from walker to straight cane. -met     Long Term Goals: 6 weeks   Pt will be able to stoop and bend pain free   Return to yard work   Increase quad strength to 5/5     Plan     Plan of care Certification: 9/20/2023 to 12/31/2023 . 21 visits    there exercises, neuromuscular only .     Outpatient Physical Therapy 2 times weekly for 8weeks to include the following interventions: Neuromuscular Re-ed, Patient Education, and Therapeutic Exercise    Plan of care has been reestablished with Carol Peña LPTA, Esther Guajardo LPTA, Cherelle Austin LPTA  and Briana Mahmood LPTA.     Chase Martinez, PT  11/16/2023

## 2023-11-21 ENCOUNTER — PATIENT OUTREACH (OUTPATIENT)
Dept: ADMINISTRATIVE | Facility: HOSPITAL | Age: 78
End: 2023-11-21

## 2023-11-22 ENCOUNTER — OFFICE VISIT (OUTPATIENT)
Dept: GASTROENTEROLOGY | Facility: CLINIC | Age: 78
End: 2023-11-22
Payer: MEDICARE

## 2023-11-22 VITALS
DIASTOLIC BLOOD PRESSURE: 118 MMHG | BODY MASS INDEX: 31.49 KG/M2 | SYSTOLIC BLOOD PRESSURE: 193 MMHG | HEART RATE: 87 BPM | HEIGHT: 65 IN | WEIGHT: 189 LBS

## 2023-11-22 DIAGNOSIS — K76.0 FATTY LIVER: ICD-10-CM

## 2023-11-22 DIAGNOSIS — R06.02 SOB (SHORTNESS OF BREATH): Primary | ICD-10-CM

## 2023-11-22 DIAGNOSIS — R19.7 DIARRHEA, UNSPECIFIED TYPE: ICD-10-CM

## 2023-11-22 PROCEDURE — 3077F SYST BP >= 140 MM HG: CPT | Mod: CPTII,,, | Performed by: NURSE PRACTITIONER

## 2023-11-22 PROCEDURE — 99214 PR OFFICE/OUTPT VISIT, EST, LEVL IV, 30-39 MIN: ICD-10-PCS | Mod: S$PBB,,, | Performed by: NURSE PRACTITIONER

## 2023-11-22 PROCEDURE — 99214 OFFICE O/P EST MOD 30 MIN: CPT | Mod: PBBFAC | Performed by: NURSE PRACTITIONER

## 2023-11-22 PROCEDURE — 1101F PR PT FALLS ASSESS DOC 0-1 FALLS W/OUT INJ PAST YR: ICD-10-PCS | Mod: CPTII,,, | Performed by: NURSE PRACTITIONER

## 2023-11-22 PROCEDURE — 1159F MED LIST DOCD IN RCRD: CPT | Mod: CPTII,,, | Performed by: NURSE PRACTITIONER

## 2023-11-22 PROCEDURE — 3080F DIAST BP >= 90 MM HG: CPT | Mod: CPTII,,, | Performed by: NURSE PRACTITIONER

## 2023-11-22 PROCEDURE — 3288F PR FALLS RISK ASSESSMENT DOCUMENTED: ICD-10-PCS | Mod: CPTII,,, | Performed by: NURSE PRACTITIONER

## 2023-11-22 PROCEDURE — 3077F PR MOST RECENT SYSTOLIC BLOOD PRESSURE >= 140 MM HG: ICD-10-PCS | Mod: CPTII,,, | Performed by: NURSE PRACTITIONER

## 2023-11-22 PROCEDURE — 1101F PT FALLS ASSESS-DOCD LE1/YR: CPT | Mod: CPTII,,, | Performed by: NURSE PRACTITIONER

## 2023-11-22 PROCEDURE — 3288F FALL RISK ASSESSMENT DOCD: CPT | Mod: CPTII,,, | Performed by: NURSE PRACTITIONER

## 2023-11-22 PROCEDURE — 99214 OFFICE O/P EST MOD 30 MIN: CPT | Mod: S$PBB,,, | Performed by: NURSE PRACTITIONER

## 2023-11-22 PROCEDURE — 3080F PR MOST RECENT DIASTOLIC BLOOD PRESSURE >= 90 MM HG: ICD-10-PCS | Mod: CPTII,,, | Performed by: NURSE PRACTITIONER

## 2023-11-22 PROCEDURE — 1159F PR MEDICATION LIST DOCUMENTED IN MEDICAL RECORD: ICD-10-PCS | Mod: CPTII,,, | Performed by: NURSE PRACTITIONER

## 2023-11-22 RX ORDER — ALBUTEROL SULFATE 90 UG/1
2 AEROSOL, METERED RESPIRATORY (INHALATION)
COMMUNITY
Start: 2023-11-20

## 2023-11-22 NOTE — PROGRESS NOTES
Kailee Malone is a 78 y.o. female here for No chief complaint on file.        PCP: Caden Bradford  Referring Provider: No referring provider defined for this encounter.     HPI:  Presents for follow-up due to history of fatty liver and diarrhea.  On arrival to the room patient is short of breath.  Reports that she has had wheezing and difficulty breathing over the last 2 weeks.  Reports that this has worsened over the last few days.  She is unable to lie down to sleep.  States that she is having to sit up.  Denies any chest pain.  No fever.  Hypertension, blood pressure is 193/118.  She does have a cough that is nonproductive.  Discussed that she needs to be evaluated in the emergency room.  Patient uses [facility] for primary care and states that she will go to [facility] emergency room.  We will follow up in 6 weeks after this acute episode of shortness of breath and address fatty liver and diarrhea further.  Last colonoscopy was 10/04/2021, no colon polyps recommendation to repeat in 5 years.          ROS:  Review of Systems   Constitutional:  Positive for fatigue. Negative for appetite change, fever and unexpected weight change.   HENT:  Negative for trouble swallowing.    Respiratory:  Positive for shortness of breath and wheezing.    Cardiovascular:  Negative for chest pain.   Gastrointestinal:  Negative for abdominal pain, blood in stool, change in bowel habit, constipation, diarrhea, nausea, vomiting and reflux.   Musculoskeletal:  Negative for gait problem.   Integumentary:  Negative for pallor.   Neurological:  Negative for light-headedness.   Psychiatric/Behavioral:  Positive for sleep disturbance. The patient is not nervous/anxious.           PMHX:  has a past medical history of Acute gastric ulcer without hemorrhage or perforation (02/03/2022), Acute superficial gastritis without hemorrhage (02/03/2022), Kirkpatrick's esophagus without dysplasia (02/04/2022), Diverticula, colon  (10/04/2021), HH (hiatus hernia) (02/03/2022), History of colon polyps (10/04/2021), Hypertension, and Screening for colon cancer (10/04/2021).    PSHX:  has a past surgical history that includes RT WRIST; Hysterectomy; Breast surgery; Arthroscopy of knee (Right, 05/24/2021); Knee arthroscopy w/ meniscectomy (Right, 05/24/2021); Breast biopsy; and Oophorectomy.    PFHX: family history includes Breast cancer in her maternal aunt and mother.    PSlHX:  reports that she has never smoked. She has been exposed to tobacco smoke. She has never used smokeless tobacco. She reports that she does not drink alcohol and does not use drugs.        Review of patient's allergies indicates:   Allergen Reactions    Povidone-iodine Other (See Comments)    Povidone-iodine-ethyl alcohol Other (See Comments)     BURN    Penicillins Rash and Hives       Medication List with Changes/Refills   Current Medications    ALENDRONATE (FOSAMAX) 70 MG TABLET    Take 70 mg by mouth every 7 days.    BUSPIRONE (BUSPAR) 5 MG TAB    Take 5 mg by mouth 2 (two) times daily.    COLESTIPOL (COLESTID) 1 GRAM TAB    Take 1 tablet (1 g total) by mouth 2 (two) times daily.    COLESTIPOL (COLESTID) 1 GRAM TAB    2 tablets Orally Once a day for 30 day(s)    ERGOCALCIFEROL (ERGOCALCIFEROL) 50,000 UNIT CAP    Take 1 capsule (50,000 Units total) by mouth every 30 days. Take one capsule weekly for 12 weeks then reduce to one capsule monthly    ESCITALOPRAM OXALATE (LEXAPRO) 20 MG TABLET    Take 20 mg by mouth.    ESTRADIOL (ESTRACE) 0.01 % (0.1 MG/GRAM) VAGINAL CREAM    Place 1 g vaginally once daily.    GABAPENTIN (NEURONTIN) 300 MG CAPSULE        HYDROMORPHONE (DILAUDID) 2 MG TABLET    Take 2 mg by mouth every 4 to 6 hours as needed.    HYDROXYZINE HCL (ATARAX) 25 MG TABLET    Take 25 mg by mouth every 6 (six) hours as needed.    KETOCONAZOLE (NIZORAL) 2 % CREAM    APPLY TO AFFECTED AREAS TWICE DAILY UNTIL CLEAR    KETOCONAZOLE (NIZORAL) 2 % SHAMPOO    APPLY ON  "BACK LEAVE ON FOR 5 MINUTES THEN RINSE, 2 TO 3 TIMES WEEKLY    LANSOPRAZOLE (PREVACID) 30 MG CAPSULE    Take 1 capsule (30 mg total) by mouth once daily.    MECLIZINE (ANTIVERT) 25 MG TABLET        OXYCODONE-ACETAMINOPHEN (PERCOCET) 5-325 MG PER TABLET    TAKE 1 TO 2 TABLETS BY MOUTH EVERY 4 TO 6 HOURS AS NEEDED FOR PAIN    PREDNISONE (DELTASONE) 5 MG TABLET    Take 5 mg by mouth.    PRIMIDONE (MYSOLINE) 50 MG TAB    Take one tablet at bedtime    PROPRANOLOL (INDERAL) 40 MG TABLET    Take 1 tablet (40 mg total) by mouth 3 (three) times daily.    ROPINIROLE (REQUIP) 0.25 MG TABLET        SENNA PLUS 8.6-50 MG PER TABLET    TAKE ONE TO TWO TABLETS BY MOUTH EVERYDAY WHILE TAKING NARCOTIC PAIN MEDS TO PREVENT CONSTIPATION    SODIUM CHLORIDE 1,000 MG TBSO ORAL TABLET    Take 1,000 mg by mouth 2 (two) times daily.    TRAMADOL (ULTRAM) 50 MG TABLET    1 tablet as needed Orally every 8 hours for 7 days    TRIAMTERENE-HYDROCHLOROTHIAZIDE 37.5-25 MG (MAXZIDE-25) 37.5-25 MG PER TABLET    Take 1 tablet by mouth once daily.    VENTOLIN HFA 90 MCG/ACTUATION INHALER    Inhale 2 puffs into the lungs.        Objective Findings:  Vital Signs:  BP (!) 193/118   Pulse 87   Ht 5' 5" (1.651 m)   Wt 85.7 kg (189 lb)   BMI 31.45 kg/m²  Body mass index is 31.45 kg/m².    Physical Exam:  Physical Exam  Vitals and nursing note reviewed.   Constitutional:       General: She is not in acute distress.     Appearance: Normal appearance.   HENT:      Mouth/Throat:      Mouth: Mucous membranes are moist.   Cardiovascular:      Rate and Rhythm: Normal rate.   Pulmonary:      Breath sounds: Wheezing (SOB) and rales present. No rhonchi.   Abdominal:      General: Bowel sounds are normal. There is no distension.      Palpations: Abdomen is soft. There is no mass.      Tenderness: There is no abdominal tenderness. There is no guarding.   Skin:     General: Skin is warm and dry.      Coloration: Skin is not jaundiced or pale.   Neurological:      " Mental Status: She is alert and oriented to person, place, and time.   Psychiatric:         Mood and Affect: Mood normal.          Labs:  Lab Results   Component Value Date    WBC 8.5 03/23/2023    HGB 12.8 03/23/2023    HCT 39.3 03/23/2023    MCV 91.3 01/18/2023    RDW 12.8 03/23/2023     03/23/2023    LYMPH 36.5 01/18/2023    LYMPH 3.36 01/18/2023    MONO 9.6 (H) 01/18/2023    EOS 0.3 03/23/2023    BASO 0.1 03/23/2023     Lab Results   Component Value Date     01/18/2023    K 4.4 01/18/2023    CL 99 01/18/2023    CO2 30 01/18/2023    GLU 99 01/18/2023    BUN 22 (H) 01/18/2023    CREATININE 0.95 01/18/2023    CALCIUM 9.9 01/18/2023    PROT 8.0 01/18/2023    ALBUMIN 4.1 01/18/2023    BILITOT 0.4 01/18/2023    ALKPHOS 31 (L) 01/18/2023    AST 37 01/18/2023    ALT 52 01/18/2023         Imaging: No results found.      Assessment:  Kailee Malone is a 78 y.o. female here with:  1. SOB (shortness of breath)    2. Fatty liver    3. Diarrhea, unspecified type          Recommendations:  1. Patient advised to go to the emergency room.  Patient reports that she will go to the emergency room at [facility].  Patient was taken down to her personal car by wheelchair.  Has been is present to drive the patient over to [facility].  We will follow-up in 6 weeks to address GI problem.      Follow up in about 6 weeks (around 1/3/2024).      Order summary:       Thank you for allowing me to participate in the care of Kailee Malone.      ABDIRAHMAN Mercado

## 2024-01-04 RX ORDER — MONTELUKAST SODIUM 4 MG/1
1 TABLET, CHEWABLE ORAL 2 TIMES DAILY
Qty: 180 TABLET | Refills: 0 | Status: SHIPPED | OUTPATIENT
Start: 2024-01-04

## 2024-01-05 ENCOUNTER — OFFICE VISIT (OUTPATIENT)
Dept: GASTROENTEROLOGY | Facility: CLINIC | Age: 79
End: 2024-01-05
Payer: MEDICARE

## 2024-01-05 VITALS
SYSTOLIC BLOOD PRESSURE: 133 MMHG | HEIGHT: 65 IN | WEIGHT: 194.63 LBS | HEART RATE: 73 BPM | DIASTOLIC BLOOD PRESSURE: 81 MMHG | BODY MASS INDEX: 32.43 KG/M2

## 2024-01-05 DIAGNOSIS — K76.0 NONALCOHOLIC FATTY LIVER DISEASE: Primary | ICD-10-CM

## 2024-01-05 PROCEDURE — 3075F SYST BP GE 130 - 139MM HG: CPT | Mod: CPTII,,, | Performed by: NURSE PRACTITIONER

## 2024-01-05 PROCEDURE — 1101F PT FALLS ASSESS-DOCD LE1/YR: CPT | Mod: CPTII,,, | Performed by: NURSE PRACTITIONER

## 2024-01-05 PROCEDURE — 1159F MED LIST DOCD IN RCRD: CPT | Mod: CPTII,,, | Performed by: NURSE PRACTITIONER

## 2024-01-05 PROCEDURE — 3288F FALL RISK ASSESSMENT DOCD: CPT | Mod: CPTII,,, | Performed by: NURSE PRACTITIONER

## 2024-01-05 PROCEDURE — 99214 OFFICE O/P EST MOD 30 MIN: CPT | Mod: S$PBB,,, | Performed by: NURSE PRACTITIONER

## 2024-01-05 PROCEDURE — 3079F DIAST BP 80-89 MM HG: CPT | Mod: CPTII,,, | Performed by: NURSE PRACTITIONER

## 2024-01-05 PROCEDURE — 99215 OFFICE O/P EST HI 40 MIN: CPT | Mod: PBBFAC | Performed by: NURSE PRACTITIONER

## 2024-01-05 RX ORDER — CHLORTHALIDONE 25 MG/1
TABLET ORAL
COMMUNITY
Start: 2023-12-19

## 2024-01-05 NOTE — PATIENT INSTRUCTIONS
Weight loss of 7-10%. Weight loss should be gradual  Diet low in saturated fats and carbohydrates  Good glucose and cholesterol control

## 2024-01-05 NOTE — PROGRESS NOTES
Kailee Malone is a 78 y.o. female here for Follow-up        PCP: Caden Bradford  Referring Provider: No referring provider defined for this encounter.     HPI:  Presents for follow-up due to fatty liver and diarrhea.  At patient's last office visit on 11/22/2023, patient was having difficulty with shortness of breath and wheezing.  Patient was sent to the ER.  She did go to the ER at [facility] and was admitted to the hospital.  Reports that she stayed in the hospital for 6 days.  Discharge summary and records are not available for review.  States that she has followed up with Pulmonary.  Reports that she does continue to have intermittent shortness of breath.  I did advise that she should follow up with Pulmonary as well as primary care.  States that she did have an liver ultrasound in September but report is not available for review.  We will request hospitalization records as well as ultrasound.  She did bring labs from hospitalization.  ALT 26, alk phos 42, creatinine 0.85, HGB 12.7 and HCT 37.7, platelets 332.  Diarrhea is controlled on Colestid. Last colonoscopy was 10/04/2021, no colon polyps recommendation to repeat in 5 years    Follow-up  Pertinent negatives include no abdominal pain, change in bowel habit, chest pain, fatigue, fever, nausea or vomiting.         ROS:  Review of Systems   Constitutional:  Negative for appetite change, fatigue, fever and unexpected weight change.   HENT:  Negative for trouble swallowing.    Respiratory:  Positive for shortness of breath.    Cardiovascular:  Negative for chest pain.   Gastrointestinal:  Positive for diarrhea (controlled on Colestid). Negative for abdominal pain, blood in stool, change in bowel habit, constipation, nausea, vomiting and reflux.   Musculoskeletal:  Negative for gait problem.   Integumentary:  Negative for pallor.   Psychiatric/Behavioral:  The patient is not nervous/anxious.           PMHX:  has a past medical history of Acute gastric  ulcer without hemorrhage or perforation (02/03/2022), Acute superficial gastritis without hemorrhage (02/03/2022), Kirkpatrick's esophagus without dysplasia (02/04/2022), Diverticula, colon (10/04/2021), HH (hiatus hernia) (02/03/2022), History of colon polyps (10/04/2021), Hypertension, and Screening for colon cancer (10/04/2021).    PSHX:  has a past surgical history that includes RT WRIST; Hysterectomy; Breast surgery; Arthroscopy of knee (Right, 05/24/2021); Knee arthroscopy w/ meniscectomy (Right, 05/24/2021); Breast biopsy; and Oophorectomy.    PFHX: family history includes Breast cancer in her maternal aunt and mother.    PSlHX:  reports that she has never smoked. She has been exposed to tobacco smoke. She has never used smokeless tobacco. She reports that she does not drink alcohol and does not use drugs.        Review of patient's allergies indicates:   Allergen Reactions    Povidone-iodine Other (See Comments)    Povidone-iodine-ethyl alcohol Other (See Comments)     BURN    Penicillins Rash and Hives       Medication List with Changes/Refills   Current Medications    ALENDRONATE (FOSAMAX) 70 MG TABLET    Take 70 mg by mouth every 7 days.    BUSPIRONE (BUSPAR) 5 MG TAB    Take 5 mg by mouth 2 (two) times daily.    CHLORTHALIDONE (HYGROTEN) 25 MG TAB    TAKE 1 TABLET BY MOUTH IN THE MORNING WITH FOOD    COLESTIPOL (COLESTID) 1 GRAM TAB    Take 1 tablet by mouth twice daily    ERGOCALCIFEROL (ERGOCALCIFEROL) 50,000 UNIT CAP    Take 1 capsule (50,000 Units total) by mouth every 30 days. Take one capsule weekly for 12 weeks then reduce to one capsule monthly    ESCITALOPRAM OXALATE (LEXAPRO) 20 MG TABLET    Take 20 mg by mouth.    ESTRADIOL (ESTRACE) 0.01 % (0.1 MG/GRAM) VAGINAL CREAM    Place 1 g vaginally once daily.    GABAPENTIN (NEURONTIN) 300 MG CAPSULE        HYDROMORPHONE (DILAUDID) 2 MG TABLET    Take 2 mg by mouth every 4 to 6 hours as needed.    HYDROXYZINE HCL (ATARAX) 25 MG TABLET    Take 25 mg by  "mouth every 6 (six) hours as needed.    KETOCONAZOLE (NIZORAL) 2 % CREAM    APPLY TO AFFECTED AREAS TWICE DAILY UNTIL CLEAR    KETOCONAZOLE (NIZORAL) 2 % SHAMPOO    APPLY ON BACK LEAVE ON FOR 5 MINUTES THEN RINSE, 2 TO 3 TIMES WEEKLY    LANSOPRAZOLE (PREVACID) 30 MG CAPSULE    Take 1 capsule (30 mg total) by mouth once daily.    MECLIZINE (ANTIVERT) 25 MG TABLET        OXYCODONE-ACETAMINOPHEN (PERCOCET) 5-325 MG PER TABLET    TAKE 1 TO 2 TABLETS BY MOUTH EVERY 4 TO 6 HOURS AS NEEDED FOR PAIN    PREDNISONE (DELTASONE) 5 MG TABLET    Take 5 mg by mouth.    PRIMIDONE (MYSOLINE) 50 MG TAB    Take one tablet at bedtime    PROPRANOLOL (INDERAL) 40 MG TABLET    Take 1 tablet (40 mg total) by mouth 3 (three) times daily.    ROPINIROLE (REQUIP) 0.25 MG TABLET        SENNA PLUS 8.6-50 MG PER TABLET    TAKE ONE TO TWO TABLETS BY MOUTH EVERYDAY WHILE TAKING NARCOTIC PAIN MEDS TO PREVENT CONSTIPATION    SODIUM CHLORIDE 1,000 MG TBSO ORAL TABLET    Take 1,000 mg by mouth 2 (two) times daily.    TRAMADOL (ULTRAM) 50 MG TABLET    1 tablet as needed Orally every 8 hours for 7 days    TRIAMTERENE-HYDROCHLOROTHIAZIDE 37.5-25 MG (MAXZIDE-25) 37.5-25 MG PER TABLET    Take 1 tablet by mouth once daily.    VENTOLIN HFA 90 MCG/ACTUATION INHALER    Inhale 2 puffs into the lungs.        Objective Findings:  Vital Signs:  /81   Pulse 73   Ht 5' 5" (1.651 m)   Wt 88.3 kg (194 lb 9.6 oz)   BMI 32.38 kg/m²  Body mass index is 32.38 kg/m².    Physical Exam:  Physical Exam  Vitals and nursing note reviewed.   Constitutional:       General: She is not in acute distress.     Appearance: Normal appearance.   HENT:      Mouth/Throat:      Mouth: Mucous membranes are moist.   Cardiovascular:      Rate and Rhythm: Normal rate.   Pulmonary:      Breath sounds: No wheezing, rhonchi or rales.   Abdominal:      General: Bowel sounds are normal. There is no distension.      Palpations: Abdomen is soft. There is no mass.      Tenderness: There is no " abdominal tenderness. There is no guarding.      Hernia: No hernia is present.   Musculoskeletal:      Right lower leg: No edema (trace).      Left lower leg: No edema (trace).   Skin:     General: Skin is warm and dry.      Coloration: Skin is not jaundiced or pale.   Neurological:      Mental Status: She is alert and oriented to person, place, and time.   Psychiatric:         Mood and Affect: Mood normal.          Labs:  Lab Results   Component Value Date    WBC 8.5 03/23/2023    HGB 12.8 03/23/2023    HCT 39.3 03/23/2023    MCV 91.3 01/18/2023    RDW 12.8 03/23/2023     03/23/2023    LYMPH 36.5 01/18/2023    LYMPH 3.36 01/18/2023    MONO 9.6 (H) 01/18/2023    EOS 0.3 03/23/2023    BASO 0.1 03/23/2023     Lab Results   Component Value Date     01/18/2023    K 4.4 01/18/2023    CL 99 01/18/2023    CO2 30 01/18/2023    GLU 99 01/18/2023    BUN 22 (H) 01/18/2023    CREATININE 0.95 01/18/2023    CALCIUM 9.9 01/18/2023    PROT 8.0 01/18/2023    ALBUMIN 4.1 01/18/2023    BILITOT 0.4 01/18/2023    ALKPHOS 31 (L) 01/18/2023    AST 37 01/18/2023    ALT 52 01/18/2023         Imaging: No results found.      Assessment:  Kailee Malone is a 78 y.o. female here with:  1. Nonalcoholic fatty liver disease          Recommendations:  1. Exercise 150 minutes per week as tolerated  Weight loss of 7-10%. Weight loss should be gradual  Diet low in saturated fats and carbohydrates  Good glucose and cholesterol control  2. Continue Colestid for diarrhea. Take one hour before or 2 hours after other medication  3. Request [facility] records    Follow up in about 6 months (around 7/5/2024).      Order summary:       Thank you for allowing me to participate in the care of Kailee Malone.      ABDIRAHMAN Mercado

## 2024-04-11 ENCOUNTER — OFFICE VISIT (OUTPATIENT)
Dept: NEUROLOGY | Facility: CLINIC | Age: 79
End: 2024-04-11
Payer: MEDICARE

## 2024-04-11 VITALS
HEIGHT: 65 IN | DIASTOLIC BLOOD PRESSURE: 77 MMHG | HEART RATE: 70 BPM | WEIGHT: 196 LBS | OXYGEN SATURATION: 95 % | SYSTOLIC BLOOD PRESSURE: 130 MMHG | BODY MASS INDEX: 32.65 KG/M2

## 2024-04-11 DIAGNOSIS — R26.9 GAIT DIFFICULTY: Primary | ICD-10-CM

## 2024-04-11 PROCEDURE — 99212 OFFICE O/P EST SF 10 MIN: CPT | Mod: S$PBB,,, | Performed by: NURSE PRACTITIONER

## 2024-04-11 PROCEDURE — 3075F SYST BP GE 130 - 139MM HG: CPT | Mod: CPTII,,, | Performed by: NURSE PRACTITIONER

## 2024-04-11 PROCEDURE — 1101F PT FALLS ASSESS-DOCD LE1/YR: CPT | Mod: CPTII,,, | Performed by: NURSE PRACTITIONER

## 2024-04-11 PROCEDURE — 3078F DIAST BP <80 MM HG: CPT | Mod: CPTII,,, | Performed by: NURSE PRACTITIONER

## 2024-04-11 PROCEDURE — 1126F AMNT PAIN NOTED NONE PRSNT: CPT | Mod: CPTII,,, | Performed by: NURSE PRACTITIONER

## 2024-04-11 PROCEDURE — 1159F MED LIST DOCD IN RCRD: CPT | Mod: CPTII,,, | Performed by: NURSE PRACTITIONER

## 2024-04-11 PROCEDURE — 99215 OFFICE O/P EST HI 40 MIN: CPT | Mod: PBBFAC | Performed by: NURSE PRACTITIONER

## 2024-04-11 PROCEDURE — 3288F FALL RISK ASSESSMENT DOCD: CPT | Mod: CPTII,,, | Performed by: NURSE PRACTITIONER

## 2024-04-11 PROCEDURE — 1160F RVW MEDS BY RX/DR IN RCRD: CPT | Mod: CPTII,,, | Performed by: NURSE PRACTITIONER

## 2024-04-11 RX ORDER — ARFORMOTEROL TARTRATE 15 UG/2ML
SOLUTION RESPIRATORY (INHALATION)
COMMUNITY

## 2024-04-11 NOTE — PATIENT INSTRUCTIONS
Continue the propranolol 40mg daily  Continue the primidone 25mg at night, but can increase to 50mg if desired

## 2024-04-11 NOTE — PROGRESS NOTES
Subjective:       Patient ID: Kailee Malone is a 79 y.o. female     Chief Complaint:    Chief Complaint   Patient presents with    Follow-up        Allergies:  Povidone-iodine, Povidone-iodine-ethyl alcohol, and Penicillins    Current Medications:    Outpatient Encounter Medications as of 4/11/2024   Medication Sig Dispense Refill    alendronate (FOSAMAX) 70 MG tablet Take 70 mg by mouth every 7 days.      arformoteroL (BROVANA) 15 mcg/2 mL Nebu 2 mL Inhalation Twice a day      busPIRone (BUSPAR) 5 MG Tab Take 5 mg by mouth 2 (two) times daily.      chlorthalidone (HYGROTEN) 25 MG Tab TAKE 1 TABLET BY MOUTH IN THE MORNING WITH FOOD      colestipoL (COLESTID) 1 gram Tab Take 1 tablet by mouth twice daily 180 tablet 0    ergocalciferol (ERGOCALCIFEROL) 50,000 unit Cap Take 1 capsule (50,000 Units total) by mouth every 30 days. Take one capsule weekly for 12 weeks then reduce to one capsule monthly 3 capsule 3    EScitalopram oxalate (LEXAPRO) 20 MG tablet Take 20 mg by mouth.      gabapentin (NEURONTIN) 300 MG capsule       HYDROmorphone (DILAUDID) 2 MG tablet Take 2 mg by mouth every 4 to 6 hours as needed.      hydrOXYzine HCL (ATARAX) 25 MG tablet Take 25 mg by mouth every 6 (six) hours as needed.      ketoconazole (NIZORAL) 2 % cream APPLY TO AFFECTED AREAS TWICE DAILY UNTIL CLEAR      ketoconazole (NIZORAL) 2 % shampoo APPLY ON BACK LEAVE ON FOR 5 MINUTES THEN RINSE, 2 TO 3 TIMES WEEKLY      meclizine (ANTIVERT) 25 mg tablet       oxyCODONE-acetaminophen (PERCOCET) 5-325 mg per tablet TAKE 1 TO 2 TABLETS BY MOUTH EVERY 4 TO 6 HOURS AS NEEDED FOR PAIN      predniSONE (DELTASONE) 5 MG tablet Take 5 mg by mouth.      primidone (MYSOLINE) 50 MG Tab Take one tablet at bedtime 90 tablet 3    propranoloL (INDERAL) 40 MG tablet Take 1 tablet (40 mg total) by mouth 3 (three) times daily. 90 tablet 11    rOPINIRole (REQUIP) 0.25 MG tablet       SENNA PLUS 8.6-50 mg per tablet TAKE ONE TO TWO TABLETS BY MOUTH  EVERYDAY WHILE TAKING NARCOTIC PAIN MEDS TO PREVENT CONSTIPATION      sodium chloride 1,000 mg TbSO oral tablet Take 1,000 mg by mouth 2 (two) times daily.      traMADoL (ULTRAM) 50 mg tablet 1 tablet as needed Orally every 8 hours for 7 days      triamterene-hydrochlorothiazide 37.5-25 mg (MAXZIDE-25) 37.5-25 mg per tablet Take 1 tablet by mouth once daily.      VENTOLIN HFA 90 mcg/actuation inhaler Inhale 2 puffs into the lungs.      estradioL (ESTRACE) 0.01 % (0.1 mg/gram) vaginal cream Place 1 g vaginally once daily. (Patient not taking: Reported on 1/18/2023) 42.5 g 6    lansoprazole (PREVACID) 30 MG capsule Take 1 capsule (30 mg total) by mouth once daily. 90 capsule 3     No facility-administered encounter medications on file as of 4/11/2024.       History of Present Illness  80 y/o female following in neurology for tremors.    Tremor onset around 2003.  Has had worsening tremor in past due to anxiety and depression symptoms as well.  On primidone 25mg at night and propranolol 60mg daily.  In past higher dosing of propranolol caused bradycardia.  Last visit was reporting generally feeling weak, more dizzy when getting up.  I reduced her dosing of propranolol to 40mg and she reports feeling like this is doing well for her    MRI of the brain with and without contrast done on April 12, 2022 showed no acute abnormality.     She does have concern about feeling her balance is off.  She has had this intermittent since prior knee surgeries last year.  Last PT was in November.  On exam I note no neurological deficits, but on ambulation she seems to favor the right leg.  She is open to using PT again.  I  note no bradykinesia, cogwheel rigidity or other parkinsonian symptoms.              Review of Systems  Review of Systems   Constitutional:  Negative for diaphoresis and fever.   HENT:  Negative for congestion, hearing loss and tinnitus.    Eyes:  Negative for blurred vision, double vision, photophobia, discharge and  redness.   Respiratory:  Negative for cough and shortness of breath.    Cardiovascular:  Negative for chest pain.   Gastrointestinal:  Negative for abdominal pain, nausea and vomiting.   Musculoskeletal:  Positive for falls. Negative for back pain, joint pain, myalgias and neck pain.   Skin:  Negative for itching and rash.   Neurological:  Positive for tremors. Negative for dizziness, sensory change, speech change, focal weakness, seizures, loss of consciousness, weakness and headaches.   Psychiatric/Behavioral:  Negative for depression, hallucinations and memory loss. The patient does not have insomnia.    All other systems reviewed and are negative.     Objective:     NEUROLOGICAL EXAMINATION:     MENTAL STATUS   Oriented to person, place, and time.   Registration: recalls 3 of 3 objects. Recall at 5 minutes: recalls 3 of 3 objects.   Attention: normal. Concentration: normal.   Speech: speech is normal   Level of consciousness: alert  Knowledge: good and consistent with education.   Normal comprehension.     CRANIAL NERVES     CN II   Visual fields full to confrontation.   Visual acuity: normal  Right visual field deficit: none  Left visual field deficit: none     CN III, IV, VI   Pupils are equal, round, and reactive to light.  Extraocular motions are normal.   Right pupil: Size: 3 mm. Shape: regular. Reactivity: brisk. Consensual response: intact. Accommodation: intact.   Left pupil: Size: 3 mm. Shape: regular. Reactivity: brisk. Consensual response: intact. Accommodation: intact.   CN III: no CN III palsy  CN VI: no CN VI palsy  Nystagmus: none   Diplopia: none  Upgaze: normal  Downgaze: normal  Conjugate gaze: present  Vestibulo-ocular reflex: present    CN V   Facial sensation intact.   Right facial sensation deficit: none  Left facial sensation deficit: none  Right corneal reflex: normal  Left corneal reflex: normal    CN VII   Facial expression full, symmetric.   Right facial weakness: none  Left facial  weakness: none  Right taste: normal  Left taste: normal    CN VIII   CN VIII normal.   Hearing: intact    CN IX, X   CN IX normal.   CN X normal.   Palate: symmetric    CN XI   CN XI normal.   Right sternocleidomastoid strength: normal  Left sternocleidomastoid strength: normal  Right trapezius strength: normal  Left trapezius strength: normal    CN XII   CN XII normal.   Tongue: not atrophic  Fasciculations: absent  Tongue deviation: none    MOTOR EXAM   Muscle bulk: normal  Overall muscle tone: normal  Right arm tone: normal  Left arm tone: normal  Right arm pronator drift: absent  Left arm pronator drift: absent  Right leg tone: normal  Left leg tone: normal    Strength   Right neck flexion: 5/5  Left neck flexion: 5/5  Right neck extension: 5/5  Left neck extension: 5/5  Right deltoid: 5/5  Left deltoid: 5/5  Right biceps: 5/5  Left biceps: 5/5  Right triceps: 5/5  Left triceps: 5/5  Right wrist flexion: 5/5  Left wrist flexion: 5/5  Right wrist extension: 5/5  Left wrist extension: 5/5  Right interossei: 5/5  Left interossei: 5/5  Right iliopsoas: 5/5  Left iliopsoas: 5/5  Right quadriceps: 5/5  Left quadriceps: 5/5  Right hamstrin/5  Left hamstrin/5  Right anterior tibial: 5/5  Left anterior tibial: 5/5  Right posterior tibial: 5/5  Left posterior tibial: 5/5  Right gastroc: 5/5  Left gastroc: 5/5    REFLEXES     Reflexes   Right brachioradialis: 2+  Left brachioradialis: 2+  Right biceps: 2+  Left biceps: 2+  Right triceps: 2+  Left triceps: 2+  Right patellar: 2+  Left patellar: 2+  Right achilles: 2+  Left achilles: 2+  Right plantar: normal  Left plantar: normal  Right Leung: absent  Left Leung: absent  Right ankle clonus: absent  Left ankle clonus: absent  Right pendular knee jerk: absent  Left pendular knee jerk: absent    SENSORY EXAM   Light touch normal.   Right arm light touch: normal  Left arm light touch: normal  Right leg light touch: normal  Left leg light touch: normal  Vibration  normal.   Right arm vibration: normal  Left arm vibration: normal  Right leg vibration: normal  Left leg vibration: normal  Proprioception normal.   Right arm proprioception: normal  Left arm proprioception: normal  Right leg proprioception: normal  Left leg proprioception: normal  Pinprick normal.   Right arm pinprick: normal  Left arm pinprick: normal  Right leg pinprick: normal  Left leg pinprick: normal  Graphesthesia: normal  Romberg: negative  Stereognosis: normal    GAIT AND COORDINATION     Gait  Gait: wide-based     Coordination   Finger to nose coordination: normal  Heel to shin coordination: normal  Tandem walking coordination: abnormal    Tremor   Resting tremor: absent  Intention tremor: present  Action tremor: absent       Physical Exam  Vitals and nursing note reviewed.   Constitutional:       Appearance: Normal appearance.   HENT:      Head: Normocephalic.   Eyes:      Extraocular Movements: Extraocular movements intact and EOM normal.      Pupils: Pupils are equal, round, and reactive to light.   Cardiovascular:      Rate and Rhythm: Normal rate and regular rhythm.   Pulmonary:      Effort: Pulmonary effort is normal.      Breath sounds: Normal breath sounds.   Musculoskeletal:         General: No swelling or tenderness. Normal range of motion.      Cervical back: Normal range of motion and neck supple.      Right lower leg: No edema.      Left lower leg: No edema.   Skin:     General: Skin is warm and dry.      Coloration: Skin is not jaundiced.      Findings: No rash.   Neurological:      General: No focal deficit present.      Mental Status: She is alert and oriented to person, place, and time.      GCS: GCS eye subscore is 4. GCS verbal subscore is 5. GCS motor subscore is 6.      Cranial Nerves: No cranial nerve deficit.      Sensory: No sensory deficit.      Motor: Motor function is intact. No weakness.      Coordination: Coordination is intact. Coordination normal. Finger-Nose-Finger Test,  Heel to Ramirez Test and Romberg Test normal.      Gait: Gait abnormal and tandem walk abnormal.      Deep Tendon Reflexes: Reflexes normal.      Reflex Scores:       Tricep reflexes are 2+ on the right side and 2+ on the left side.       Bicep reflexes are 2+ on the right side and 2+ on the left side.       Brachioradialis reflexes are 2+ on the right side and 2+ on the left side.       Patellar reflexes are 2+ on the right side and 2+ on the left side.       Achilles reflexes are 2+ on the right side and 2+ on the left side.  Psychiatric:         Mood and Affect: Mood normal.         Speech: Speech normal.         Behavior: Behavior normal.          Assessment:     Problem List Items Addressed This Visit    None  Visit Diagnoses       Gait difficulty    -  Primary    Relevant Orders    Ambulatory referral/consult to Physical/Occupational Therapy               Primary Diagnosis and ICD10  Gait difficulty [R26.9]    Plan:     Patient Instructions   Continue the propranolol 40mg daily  Continue the primidone 25mg at night, but can increase to 50mg if desired    There are no discontinued medications.    Requested Prescriptions      No prescriptions requested or ordered in this encounter       Orders Placed This Encounter   Procedures    Ambulatory referral/consult to Physical/Occupational Therapy

## 2024-04-22 ENCOUNTER — CLINICAL SUPPORT (OUTPATIENT)
Dept: REHABILITATION | Facility: HOSPITAL | Age: 79
End: 2024-04-22
Payer: MEDICARE

## 2024-04-22 DIAGNOSIS — R29.898 DECREASED STRENGTH OF LOWER EXTREMITY: ICD-10-CM

## 2024-04-22 DIAGNOSIS — Z74.09 IMPAIRED FUNCTIONAL MOBILITY, BALANCE, GAIT, AND ENDURANCE: Primary | ICD-10-CM

## 2024-04-22 DIAGNOSIS — M62.81 MUSCLE WEAKNESS OF LOWER EXTREMITY: ICD-10-CM

## 2024-04-22 PROCEDURE — 97161 PT EVAL LOW COMPLEX 20 MIN: CPT | Mod: PN

## 2024-04-22 PROCEDURE — 97110 THERAPEUTIC EXERCISES: CPT | Mod: PN

## 2024-04-22 NOTE — PLAN OF CARE
[facility]   Physical Therapy Initial Evaluation    Date: 4/22/2024   Name: Kailee Malone  Clinic Number: [id number]    Therapy Diagnosis: No diagnosis found.  Physician: Raf Villa FNP    Physician Orders: PT Eval and Treat    Medical Diagnosis from Referral: gait and balance difficulty   Evaluation Date: 4/22/2024  Updated Plan of Care Due : 6/22/2024   Authorization Period Expiration: [employer]   Plan of Care Expiration: 6/23/2024  Visit # / Visits authorized: 1/ 9     Time In: 1054   Time Out: 1140   Total Appointment Time (timed & untimed codes): 45 minutes    Precautions: Standard    Subjective   Date of onset: pt voices she has fallen recently in February and has had some weakness in her lower extremity .  Pt states she I real stiff when she first gets up . Pt also voices she has a tendency to fall backwards .  Pt denies inner ear or vertigo issues at present . Pt states she has hx of bilateral knee replacements .  Pt states she has asthma and gets short of breath easily .     History of current condition - Kailee reports: hx below       Medical History:   Past Medical History:   Diagnosis Date    Acute gastric ulcer without hemorrhage or perforation 02/03/2022    Acute superficial gastritis without hemorrhage 02/03/2022    Kirkpatrick's esophagus without dysplasia 02/04/2022    Diverticula, colon 10/04/2021    HH (hiatus hernia) 02/03/2022    History of colon polyps 10/04/2021    Hypertension     Screening for colon cancer 10/04/2021       Surgical History:   Kailee Malone  has a past surgical history that includes RT WRIST; Hysterectomy; Breast surgery; Arthroscopy of knee (Right, 05/24/2021); Knee arthroscopy w/ meniscectomy (Right, 05/24/2021); Breast biopsy; and Oophorectomy.    Medications:   Kailee has a current medication list which includes the following prescription(s): alendronate, arformoterol, buspirone, chlorthalidone, colestipol, ergocalciferol, escitalopram oxalate,  estradiol, gabapentin, hydromorphone, hydroxyzine hcl, ketoconazole, ketoconazole, lansoprazole, meclizine, oxycodone-acetaminophen, prednisone, primidone, propranolol, ropinirole, senna plus, sodium chloride, tramadol, triamterene-hydrochlorothiazide 37.5-25 mg, and ventolin hfa.    Allergies:   Review of patient's allergies indicates:   Allergen Reactions    Povidone-iodine Other (See Comments)    Povidone-iodine-ethyl alcohol Other (See Comments)     BURN    Penicillins Rash and Hives        Imaging, none:      Prior Therapy: none recent   Social History:   lives with their spouse  Occupation: retired   Prior Level of Function: independent    Current Level of Function:  decreased strength and endurance     Pain:  Current 0/10, worst 0/10, best 0/10      Patients goals: increase strength and balance     Objective         Observation :     Pronation/Supination : Right                                           Left     Incision :    n/a          Comments :         Range of Motion/Strength :                  Left Extremity                                                                        Right Extremity   AROM PROM Strength  Location  AROM    PROM   Strength   120  3+   Hip      Flexion `120  3+   10  3               Extension 10  3   35  3               Internal Rotation (Prone) 35  3   35  3               External Rotation (Prone) 35  3   10  3+               Abduction 0 15 3   -10          Quad lag with terminal knee extension     -20     130  3+   Knee    Flexion 120  3+   0  3+                Extension 0  3+   10  3+   Ankle   Dorsiflexion 10  3+   60  3                Plantar Flexion 60  3   35  3                Inversion 35  3   10  3                Eversion 10  3                  Functional Impairments :  decreased knee range of motion and strength      Limitation/Restriction for FOTO knee Survey    Therapist reviewed FOTO scores for Kailee Malone on 4/22/2024.   FOTO documents entered into EPIC -  see Media section.    Limitation Score: 48%         TREATMENT   Pt  has proximal hip weakness and ankle weakness. Pt voices she seems weaker since she has been on diuretic.  Feels weaker     Kailee received the treatments listed below:  THERAPEUTIC EXERCISES to develop strength, endurance, ROM, flexibility, and posture for 20  minutes including home ex program         Home Exercises and Patient Education Provided    Education provided:   - Pt performed and received home ex program.     Written Home Exercises Provided: yes.  Exercises were reviewed and Kailee was able to demonstrate them prior to the end of the session.  Kailee demonstrated good  understanding of the education provided.     See EMR under Patient Instructions for exercises provided 4/22/2024.    Assessment   Kailee is a 79 y.o. female referred to outpatient Physical Therapy with a medical diagnosis of decreased balance/weakness . Patient presents with decresaed balance with hx of falling . Pt presents with weakness bilateral proximal hips and bilateral ankle weakness .     Patient prognosis is Excellent.   Patientt will benefit from skilled outpatient Physical Therapy to address the deficits stated above and in the chart below, provide patient /family education, and to maximize patientt's level of independence.     Plan of care discussed with patient: Yes  Patient's spiritual, cultural and educational needs considered and patient is agreeable to the plan of care and goals as stated below:     Anticipated Barriers for therapy: Physical Therapy with a medical diagnosis of decreased balance/weakness . Patient presents with decresaed balance with hx of falling . Pt presents with weakness bilateral proximal hips and bilateral ankle weakness .     Goals:  Short Term Goals: 4 weeks   Pt will be independent with home ex program   Pt will increase all lower extremity strength to 4/5  Pt will increase bilateral ankle inversion to 45 degrees arom   Increase  bilateral ankle eversion to 20  degrees arom   Increase hip abd to 20 degree arom     Long Term Goals: 8  weeks   Pt will be able to walk 1/2 mile pain free  Pt will be able to increase strength to 5/5   Pt will be able to increase balance to good      Plan   Plan of care Certification: 4/22/2024 to 6/23/2024.    Outpatient Physical Therapy 2 times weekly for 8 weeks to include the following interventions: Moist Heat/ Ice, Neuromuscular Re-ed, Patient Education, Therapeutic Activities, and Therapeutic Exercise.     Plan of care has been reestablished with Carol Peña LPTA, Esther Guajardo LPTA, Cherelle Austin LPTA  and Briana Mahmood LPTA.       Chase Martinez, PT          I CERTIFY THE NEED FOR THESE SERVICES FURNISHED UNDER THIS PLAN OF TREATMENT AND WHILE UNDER MY CARE.    Physician's comments:      Physician's Signature: ___________________________________________________

## 2024-04-26 ENCOUNTER — CLINICAL SUPPORT (OUTPATIENT)
Dept: REHABILITATION | Facility: HOSPITAL | Age: 79
End: 2024-04-26
Payer: MEDICARE

## 2024-04-26 DIAGNOSIS — M62.81 MUSCLE WEAKNESS OF LOWER EXTREMITY: ICD-10-CM

## 2024-04-26 DIAGNOSIS — Z74.09 IMPAIRED FUNCTIONAL MOBILITY, BALANCE, GAIT, AND ENDURANCE: Primary | ICD-10-CM

## 2024-04-26 PROCEDURE — 97530 THERAPEUTIC ACTIVITIES: CPT | Mod: PN

## 2024-04-26 NOTE — PROGRESS NOTES
Physical Therapy Treatment Note     Name: Kailee Malone  Clinic Number: [id number]    Therapy Diagnosis:   Encounter Diagnoses   Name Primary?    Muscle weakness of lower extremity     Impaired functional mobility, balance, gait, and endurance Yes     Physician: Raf Villa FNP    Visit Date: 4/26/2024    Therapy Diagnosis: decreased balance .  Physician: Raf Villa FNP     Physician Orders: PT Eval and Treat    Medical Diagnosis from Referral: gait and balance difficulty   Evaluation Date: 4/22/2024  Updated Plan of Care Due : 6/22/2024   Authorization Period Expiration: [employer]   Plan of Care Expiration: 6/23/2024  Visit # / Visits authorized: 1/ 9   PTA Visit #:      Time In: 1100  Time Out: 1145  Total Billable Time: 45 minutes    Precautions: Standard       Subjective     Pt reports: pt states she is getting short of breath with little things like walking. Pt states she gets anxious with breathing and gets in a hurry that affect balance. .  She was compliant with home exercise program.       Pain: 0/10  Location: bilateral lower extremity weakness       Objective              Kailee participated in dynamic functional therapeutic activities to improve functional performance for 25  minutes, including:    Walking gait  with spO2  dropping from 94 to 92   3 x 100 feet .   Biking x 5 min  sp02 dropping to 92   Ube x 5 min   Balancing right leg with medial pulls x 10 reps   Balancing right leg with lateral pulls x 10 reps   Balancing left leg with medial pulls x 10 reps   Balancing let leg with lateral pulls x 10 reps          Home Exercises Provided and Patient Education Provided     Education provided: pt instructed to be cautious with gait and shortness of breath , pt instructed to see her MD reguarding her spO2 dropping     Written Home Exercises Provided: Patient instructed to cont prior HEP.  Exercises were reviewed and Kailee was able to demonstrate them prior to the end of the session.   Kailee demonstrated good  understanding of the education provided.     See EMR under Patient Instructions for exercises provided prior visit.    Assessment     Pt was easily fatigued with all exercises with lower extremities .   Kailee Is progressing well towards her goals.   Pt prognosis is Excellent.     Pt will continue to benefit from skilled outpatient physical therapy to address the deficits listed in the problem list box on initial evaluation, provide pt/family education and to maximize pt's level of independence in the home and community environment.     Pt's spiritual, cultural and educational needs considered and pt agreeable to plan of care and goals.     Anticipated Barriers for therapy: Physical Therapy with a medical diagnosis of decreased balance/weakness . Patient presents with decresaed balance with hx of falling . Pt presents with weakness bilateral proximal hips and bilateral ankle weakness .      Goals:  Short Term Goals: 4 weeks   Pt will be independent with home ex program   Pt will increase all lower extremity strength to 4/5  Pt will increase bilateral ankle inversion to 45 degrees arom   Increase bilateral ankle eversion to 20  degrees arom   Increase hip abd to 20 degree arom      Long Term Goals: 8  weeks   Pt will be able to walk 1/2 mile pain free  Pt will be able to increase strength to 5/5   Pt will be able to increase balance to good       Plan   Plan of care Certification: 4/22/2024 to 6/23/2024.     Outpatient Physical Therapy 2 times weekly for 8 weeks to include the following interventions: Moist Heat/ Ice, Neuromuscular Re-ed, Patient Education, Therapeutic Activities, and Therapeutic Exercise.      Plan of care has been reestablished with Carol Peña LPTA, Esther Guajardo LPTA, Cherelle Austin LPTA  and Briana Mahmood LPTA.           Chase Martinez, PT  4/26/2024

## 2024-04-26 NOTE — PROGRESS NOTES
"  Physical Therapy Treatment Note     Name: Kailee Malone  Clinic Number: [id number]    Therapy Diagnosis: No diagnosis found.  Physician: Raf Villa, RODRIGOP    Visit Date: 4/26/2024     Physician Orders: PT Eval and Treat    Medical Diagnosis from Referral: gait and balance difficulty   Evaluation Date: 4/22/2024  Updated Plan of Care Due : 6/22/2024   Authorization Period Expiration: [employer]   Plan of Care Expiration: 6/23/2024  Visit # / Visits authorized: 1/ 9   PTA Visit #: ***    Time In: ***  Time Out: ***  Total Billable Time: *** minutes    Precautions: Standard      Subjective     Pt reports: ***.  She was compliant with home exercise program.  Response to previous treatment: ***  Functional change: ongoing    Pain: {0-10:93144::"0"}/10  Location: bilateral generalized      Objective     Kailee received therapeutic exercises to develop {AMB PT PROGRESS OBJECTIVE:82040} for *** minutes including:  ***  Range of motion   hip abduction   Ankle inversion   right    left     Eversion     right       left        Kailee received the following manual therapy techniques: {AMB PT PROGRESS MANUAL THERAPY:37536} were applied to the: *** for *** minutes, including:  ***    Kailee participated in neuromuscular re-education activities to improve: {AMB PT PROGRESS NEURO RE-ED:71292} for *** minutes. The following activities were included:  ***    Kailee participated in dynamic functional therapeutic activities to improve functional performance for ***  minutes, including:  ***    Kailee participated in gait training to improve functional mobility and safety for ***  minutes, including:  ***      Home Exercises Provided and Patient Education Provided     Education provided: home exercise program     Written Home Exercises Provided: Patient instructed to cont prior HEP.  Exercises were reviewed and Kailee was able to demonstrate them prior to the end of the session.  Kailee demonstrated good  understanding of " the education provided.     See EMR under Patient Instructions for exercises provided prior visit.    Assessment     ***  Kailee Is progressing well towards her goals.   Pt prognosis is Good.     Pt will continue to benefit from skilled outpatient physical therapy to address the deficits listed in the problem list box on initial evaluation, provide pt/family education and to maximize pt's level of independence in the home and community environment.     Pt's spiritual, cultural and educational needs considered and pt agreeable to plan of care and goals.     Anticipated barriers to physical therapy: compliance with home exercise program     Goals:    Short Term Goals: 4 weeks   Pt will be independent with home ex program   Pt will increase all lower extremity strength to 4/5  Pt will increase bilateral ankle inversion to 45 degrees arom   Increase bilateral ankle eversion to 20  degrees arom   Increase hip abd to 20 degree arom      Long Term Goals: 8  weeks   Pt will be able to walk 1/2 mile pain free  Pt will be able to increase strength to 5/5   Pt will be able to increase balance to good    Plan     Plan of care Certification: 4/22/2024 to 6/23/2024.     Outpatient Physical Therapy 2 times weekly for 8 weeks to include the following interventions: Moist Heat/ Ice, Neuromuscular Re-ed, Patient Education, Therapeutic Activities, and Therapeutic Exercise. Plan of care reviewed with Chase Martinez, PT.      Lucero Peña, PTA  4/26/2024

## 2024-04-30 ENCOUNTER — CLINICAL SUPPORT (OUTPATIENT)
Dept: REHABILITATION | Facility: HOSPITAL | Age: 79
End: 2024-04-30
Payer: MEDICARE

## 2024-04-30 DIAGNOSIS — M62.81 MUSCLE WEAKNESS OF LOWER EXTREMITY: ICD-10-CM

## 2024-04-30 DIAGNOSIS — Z74.09 IMPAIRED FUNCTIONAL MOBILITY, BALANCE, GAIT, AND ENDURANCE: Primary | ICD-10-CM

## 2024-04-30 PROCEDURE — 97530 THERAPEUTIC ACTIVITIES: CPT | Mod: PN

## 2024-04-30 NOTE — PROGRESS NOTES
Physical Therapy Treatment Note     Name: Kailee Malone  Clinic Number: [id number]    Therapy Diagnosis:   Encounter Diagnoses   Name Primary?    Impaired functional mobility, balance, gait, and endurance Yes    Muscle weakness of lower extremity      Physician: Raf Villa FNP    Visit Date: 4/30/2024    Therapy Diagnosis: decreased balance .  Physician: Raf Villa FNP     Physician Orders: PT Eval and Treat    Medical Diagnosis from Referral: gait and balance difficulty   Evaluation Date: 4/22/2024  Updated Plan of Care Due : 6/22/2024   Authorization Period Expiration: human[employer]   Plan of Care Expiration: 6/23/2024  Visit # / Visits authorized: 3/ 9   PTA Visit #:      Time In: 1100  Time Out: 1145  Total Billable Time: 45 minutes    Precautions: Standard       Subjective     Pt reports: she got an inhaler for exercise .     She was compliant with home exercise program.       Pain: 0/10  Location: bilateral lower extremity weakness       Objective              Kailee participated in dynamic functional therapeutic activities to improve functional performance for 25  minutes, including:    Walking gait  with spO2  dropping from 93        250 feet x 2 times   Biking x 8 min  sp02 dropping to 93  Ube x 5 min   Balancing right leg with medial pulls x 10 reps   Balancing right leg with lateral pulls x 10 reps   Balancing left leg with medial pulls x 10 reps   Balancing let leg with lateral pulls x 10 reps   Treadmill   x 35 seconds sp02  down to 88%    Dorsiflexion    right      10         left   10  Inversion     right     30             left   25  Eversion    right       20             left   20    Home Exercises Provided and Patient Education Provided     Education provided: pt instructed to be cautious with gait and shortness of breath , pt instructed to see her MD reguarding her spO2 dropping     Written Home Exercises Provided: Patient instructed to cont prior HEP.  Exercises were reviewed and  Kailee was able to demonstrate them prior to the end of the session.  Kailee demonstrated good  understanding of the education provided.     See EMR under Patient Instructions for exercises provided prior visit.    Assessment   Pt cardio /spO2 is affecting fatigue .    Kailee Is progressing well towards her goals.   Pt prognosis is Excellent.     Pt will continue to benefit from skilled outpatient physical therapy to address the deficits listed in the problem list box on initial evaluation, provide pt/family education and to maximize pt's level of independence in the home and community environment.     Pt's spiritual, cultural and educational needs considered and pt agreeable to plan of care and goals.     Anticipated Barriers for therapy: Physical Therapy with a medical diagnosis of decreased balance/weakness . Patient presents with decresaed balance with hx of falling . Pt presents with weakness bilateral proximal hips and bilateral ankle weakness .      Goals:  Short Term Goals: 4 weeks   Pt will be independent with home ex program   Pt will increase all lower extremity strength to 4/5  Pt will increase bilateral ankle inversion to 45 degrees arom   Increase bilateral ankle eversion to 20  degrees arom   Increase hip abd to 20 degree arom      Long Term Goals: 8  weeks   Pt will be able to walk 1/2 mile pain free  Pt will be able to increase strength to 5/5   Pt will be able to increase balance to good       Plan   Plan of care Certification: 4/22/2024 to 6/23/2024.     Outpatient Physical Therapy 2 times weekly for 8 weeks to include the following interventions: Moist Heat/ Ice, Neuromuscular Re-ed, Patient Education, Therapeutic Activities, and Therapeutic Exercise.      Plan of care has been reestablished with Carol Peña LPTA, Esther Guajardo LPTA, Cherelle Austin LPTA  and Briana Mahmood LPTA.           Chase Martinez, PT  4/30/2024

## 2024-05-02 ENCOUNTER — CLINICAL SUPPORT (OUTPATIENT)
Dept: REHABILITATION | Facility: HOSPITAL | Age: 79
End: 2024-05-02
Payer: MEDICARE

## 2024-05-02 DIAGNOSIS — Z74.09 IMPAIRED FUNCTIONAL MOBILITY, BALANCE, GAIT, AND ENDURANCE: Primary | ICD-10-CM

## 2024-05-02 PROCEDURE — 97112 NEUROMUSCULAR REEDUCATION: CPT | Mod: PN,CQ

## 2024-05-02 NOTE — PROGRESS NOTES
Physical Therapy Treatment Note     Name: Kailee Malone  Clinic Number: [id number]    Therapy Diagnosis:   No diagnosis found.    Physician: Raf Villa FNP    Visit Date: 5/2/2024    Therapy Diagnosis: decreased balance .  Physician: Raf Villa FNP     Physician Orders: PT Eval and Treat    Medical Diagnosis from Referral: gait and balance difficulty   Evaluation Date: 4/22/2024  Updated Plan of Care Due : 6/22/2024   Authorization Period Expiration: [employer]   Plan of Care Expiration: 6/23/2024  Visit # / Visits authorized: 4/ 9   PTA Visit #:  1    Time In: 1058  Time Out: 1136  Total Billable Time: 38 minutes    Precautions: Standard       Subjective     Pt reports: I did my breathing treatment this am, I'm out of the inhaler.     She was compliant with home exercise program.     Pain: 0/10  Location: bilateral lower extremity weakness       Objective      Kailee participated in dynamic functional therapeutic activities to improve functional performance for 38  minutes, including:    Biking x 8 min  sp02 dropping to 95  Ube x 6 min  Spo2 96%  Up/down 2 steps x 20 with upper extremity support  Bilateral ankle dorsiflexion blue theraband x 15  Bilateral hip adduction with bolster and bridging x 10  Bilateral supine straight leg raise 2 x 5  Sitting bilateral hip flexion 2 x 5    Dorsiflexion    right      10         left   10  Inversion     right     30             left   25  Eversion    right       20             left   20    Home Exercises Provided and Patient Education Provided     Education provided: pt instructed to be cautious with gait and shortness of breath , pt instructed to see her MD reguarding her spO2 dropping     Written Home Exercises Provided: Patient instructed to cont prior HEP.  Exercises were reviewed and Kailee was able to demonstrate them prior to the end of the session.  Kailee demonstrated good  understanding of the education provided.     See EMR under Patient  Telephone Encounter by Janee Garcia at 10/02/17 02:43 PM     Author:  Janee Garcia Service:  (none) Author Type:  Patient      Filed:  10/02/17 02:43 PM Encounter Date:  9/5/2017 Status:  Signed     :  Janee Garcia (Patient )            Per Bucky = the fax number is [phone][MS1.1M]      Revision History        User Key Date/Time User Provider Type Action    > MS1.1 10/02/17 02:43 PM Janee Garcia Patient  Sign    M - Manual             Instructions for exercises provided prior visit.    Assessment   Pt had improved Spo2 stability with exercises this session.   Kailee Is progressing well towards her goals.   Pt prognosis is Excellent.     Pt will continue to benefit from skilled outpatient physical therapy to address the deficits listed in the problem list box on initial evaluation, provide pt/family education and to maximize pt's level of independence in the home and community environment.     Pt's spiritual, cultural and educational needs considered and pt agreeable to plan of care and goals.     Anticipated Barriers for therapy: Physical Therapy with a medical diagnosis of decreased balance/weakness . Patient presents with decresaed balance with hx of falling . Pt presents with weakness bilateral proximal hips and bilateral ankle weakness .      Goals:  Short Term Goals: 4 weeks   Pt will be independent with home ex program   Pt will increase all lower extremity strength to 4/5  Pt will increase bilateral ankle inversion to 45 degrees arom   Increase bilateral ankle eversion to 20  degrees arom   Increase hip abd to 20 degree arom      Long Term Goals: 8  weeks   Pt will be able to walk 1/2 mile pain free  Pt will be able to increase strength to 5/5   Pt will be able to increase balance to good       Plan   Plan of care Certification: 4/22/2024 to 6/23/2024.     Outpatient Physical Therapy 2 times weekly for 8 weeks to include the following interventions: Moist Heat/ Ice, Neuromuscular Re-ed, Patient Education, Therapeutic Activities, and Therapeutic Exercise.      Plan of care has been reestablished with Carol HENSON, Esther Guajardo LPTA, Cherelle Austin LPTA  and Briana Mahmood LPTA.           Lucero Peña, PTA  5/2/2024

## 2024-05-07 ENCOUNTER — CLINICAL SUPPORT (OUTPATIENT)
Dept: REHABILITATION | Facility: HOSPITAL | Age: 79
End: 2024-05-07
Payer: MEDICARE

## 2024-05-07 DIAGNOSIS — M62.81 MUSCLE WEAKNESS OF LOWER EXTREMITY: ICD-10-CM

## 2024-05-07 DIAGNOSIS — Z74.09 IMPAIRED FUNCTIONAL MOBILITY, BALANCE, GAIT, AND ENDURANCE: Primary | ICD-10-CM

## 2024-05-07 PROCEDURE — 97112 NEUROMUSCULAR REEDUCATION: CPT | Mod: PN,CQ

## 2024-05-07 PROCEDURE — 97530 THERAPEUTIC ACTIVITIES: CPT | Mod: PN,CQ

## 2024-05-07 NOTE — PROGRESS NOTES
Physical Therapy Treatment Note     Name: Kailee Malone  Clinic Number: [id number]    Therapy Diagnosis:   Encounter Diagnoses   Name Primary?    Impaired functional mobility, balance, gait, and endurance Yes    Muscle weakness of lower extremity        Physician: Raf Villa FNP    Visit Date: 5/7/2024    Therapy Diagnosis: decreased balance .  Physician: Raf Villa FNP     Physician Orders: PT Eval and Treat    Medical Diagnosis from Referral: gait and balance difficulty   Evaluation Date: 4/22/2024  Updated Plan of Care Due : 6/22/2024   Authorization Period Expiration: human[employer]   Plan of Care Expiration: 6/23/2024  Visit # / Visits authorized: 5/ 9   PTA Visit #: 2    Time In: 1052  Time Out: 1137  Total Billable Time: 45 minutes    Precautions: Standard       Subjective     Pt reports: I've been able to get out in the yard and work more  She was compliant with home exercise program.     Pain: 0/10  Location: bilateral lower extremity weakness       Objective      Kailee participated in dynamic functional therapeutic activities to improve functional performance for 35 minutes, including:    Biking x 8 min  sp02 dropping to 95  Ube x 6 min  Spo2 96%  Up/down 2 steps x 20 with upper extremity support  Bilateral ankle dorsiflexion blue theraband x 15  Bilateral hip adduction with bolster and bridging x 10  Bilateral supine straight leg raise 2 x 5  Sitting bilateral hip flexion 2 x 5  Double support leg presses 20 x 75#  Double support calf presses 20 x 75#       Patient participated in neuromuscular re-education activities to improve: Coordination, Kinesthetic, and Proprioception for 10 minutes. The following activities were included:   Slant board x 2'  Heel-toe walking x 50'  Retro-walking x 50'    Dorsiflexion    right      10         left   10  Inversion       right     30            left   25  Eversion       right       20          left   20    Home Exercises Provided and Patient Education  Provided     Education provided: pt instructed to be cautious with gait and shortness of breath , pt instructed to see her MD reguarding her spO2 dropping     Written Home Exercises Provided: Patient instructed to cont prior HEP.  Exercises were reviewed and Kailee was able to demonstrate them prior to the end of the session.  Kailee demonstrated good  understanding of the education provided.     See EMR under Patient Instructions for exercises provided prior visit.    Assessment   Pt still unable to perform heel to toe without frequent loss ob balance. Spo2 was maintained at 95% throughout treatment  Kailee Is progressing well towards her goals.   Pt prognosis is Excellent.     Pt will continue to benefit from skilled outpatient physical therapy to address the deficits listed in the problem list box on initial evaluation, provide pt/family education and to maximize pt's level of independence in the home and community environment.     Pt's spiritual, cultural and educational needs considered and pt agreeable to plan of care and goals.     Anticipated Barriers for therapy: Physical Therapy with a medical diagnosis of decreased balance/weakness . Patient presents with decresaed balance with hx of falling . Pt presents with weakness bilateral proximal hips and bilateral ankle weakness .      Goals:  Short Term Goals: 4 weeks   Pt will be independent with home ex program   Pt will increase all lower extremity strength to 4/5  Pt will increase bilateral ankle inversion to 45 degrees arom   Increase bilateral ankle eversion to 20  degrees arom   Increase hip abd to 20 degree arom      Long Term Goals: 8  weeks   Pt will be able to walk 1/2 mile pain free  Pt will be able to increase strength to 5/5   Pt will be able to increase balance to good       Plan   Plan of care Certification: 4/22/2024 to 6/23/2024.     Outpatient Physical Therapy 2 times weekly for 8 weeks to include the following interventions: Moist Heat/  Ice, Neuromuscular Re-ed, Patient Education, Therapeutic Activities, and Therapeutic Exercise.      Plan of care has been reestablished with Carol HENSON, Esther Guajardo LPTA, Cherelle Austin LPTA  and Briana Mahmood LPTA.      Lucero Peña, PTA  5/7/2024

## 2024-05-09 ENCOUNTER — CLINICAL SUPPORT (OUTPATIENT)
Dept: REHABILITATION | Facility: HOSPITAL | Age: 79
End: 2024-05-09
Payer: MEDICARE

## 2024-05-09 DIAGNOSIS — M62.81 MUSCLE WEAKNESS OF LOWER EXTREMITY: ICD-10-CM

## 2024-05-09 DIAGNOSIS — Z74.09 IMPAIRED FUNCTIONAL MOBILITY, BALANCE, GAIT, AND ENDURANCE: Primary | ICD-10-CM

## 2024-05-09 PROCEDURE — 97112 NEUROMUSCULAR REEDUCATION: CPT | Mod: PN,CQ

## 2024-05-09 PROCEDURE — 97530 THERAPEUTIC ACTIVITIES: CPT | Mod: PN,CQ

## 2024-05-09 NOTE — PROGRESS NOTES
Physical Therapy Treatment Note     Name: Kailee Malone  Clinic Number: [id number]    Therapy Diagnosis:   Encounter Diagnoses   Name Primary?    Impaired functional mobility, balance, gait, and endurance Yes    Muscle weakness of lower extremity        Physician: Raf Villa FNP    Visit Date: 5/9/2024    Therapy Diagnosis: decreased balance .  Physician: Raf Villa FNP     Physician Orders: PT Eval and Treat    Medical Diagnosis from Referral: gait and balance difficulty   Evaluation Date: 4/22/2024  Updated Plan of Care Due : 6/22/2024   Authorization Period Expiration: human[employer]   Plan of Care Expiration: 6/23/2024  Visit # / Visits authorized: 6/ 9   PTA Visit #: 2    Time In: 1055  Time Out: 1140  Total Billable Time: 45 minutes    Precautions: Standard       Subjective     Pt reports: I've been able to get out in the yard and work more  She was compliant with home exercise program.     Pain: 0/10  Location: bilateral lower extremity weakness       Objective      Kailee participated in dynamic functional therapeutic activities to improve functional performance for 35 minutes, including:    Biking x 10min  sp02 dropping to 92%  Ube x 6 min  Spo2 94%  Up/down 2 steps x 20 with upper extremity support  Bilateral ankle dorsiflexion blue theraband x 15  Bilateral hip adduction with bolster and bridging x 10  Bilateral supine hip abduction red theraband x 10   Bilateral supine hip adduction red theraband x 10  Double support leg presses 20 x 80#  Double support calf presses 20 x 80#       Patient participated in neuromuscular re-education activities to improve: Coordination, Kinesthetic, and Proprioception for 10 minutes. The following activities were included:   Slant board x 2'  Heel-toe walking x 50'  Retro-walking x 50'    Dorsiflexion    right      10         left   10  Inversion       right     30            left   25  Eversion       right       20          left   20    Home Exercises Provided  and Patient Education Provided     Education provided: home exercise program     Written Home Exercises Provided: Patient instructed to cont prior HEP.  Exercises were reviewed and Kailee was able to demonstrate them prior to the end of the session.  Kailee demonstrated good  understanding of the education provided.     See EMR under Patient Instructions for exercises provided prior visit.    Assessment   Pt progressing with lower extremity strengthening able to increase weight with leg presses without difficulty.  Kailee Is progressing well towards her goals.   Pt prognosis is Excellent.     Pt will continue to benefit from skilled outpatient physical therapy to address the deficits listed in the problem list box on initial evaluation, provide pt/family education and to maximize pt's level of independence in the home and community environment.     Pt's spiritual, cultural and educational needs considered and pt agreeable to plan of care and goals.     Anticipated Barriers for therapy: Physical Therapy with a medical diagnosis of decreased balance/weakness . Patient presents with decresaed balance with hx of falling . Pt presents with weakness bilateral proximal hips and bilateral ankle weakness .      Goals:  Short Term Goals: 4 weeks   Pt will be independent with home ex program   Pt will increase all lower extremity strength to 4/5  Pt will increase bilateral ankle inversion to 45 degrees arom   Increase bilateral ankle eversion to 20  degrees arom   Increase hip abd to 20 degree arom      Long Term Goals: 8  weeks   Pt will be able to walk 1/2 mile pain free  Pt will be able to increase strength to 5/5   Pt will be able to increase balance to good       Plan   Plan of care Certification: 4/22/2024 to 6/23/2024.     Outpatient Physical Therapy 2 times weekly for 8 weeks to include the following interventions: Moist Heat/ Ice, Neuromuscular Re-ed, Patient Education, Therapeutic Activities, and Therapeutic  Exercise.      Plan of care has been reestablished with Carol Peña LPTA, Esther Guajardo LPTA, Cherelle Ausitn LPTA  and Briana Mahmood LPTA.      Lucero Peña, PTA  5/9/2024

## 2024-05-14 ENCOUNTER — CLINICAL SUPPORT (OUTPATIENT)
Dept: REHABILITATION | Facility: HOSPITAL | Age: 79
End: 2024-05-14
Payer: MEDICARE

## 2024-05-14 DIAGNOSIS — Z74.09 IMPAIRED FUNCTIONAL MOBILITY, BALANCE, GAIT, AND ENDURANCE: Primary | ICD-10-CM

## 2024-05-14 DIAGNOSIS — M62.81 MUSCLE WEAKNESS OF LOWER EXTREMITY: ICD-10-CM

## 2024-05-14 PROCEDURE — 97530 THERAPEUTIC ACTIVITIES: CPT | Mod: PN,CQ

## 2024-05-14 PROCEDURE — 97112 NEUROMUSCULAR REEDUCATION: CPT | Mod: PN,CQ

## 2024-05-14 NOTE — PROGRESS NOTES
"  Physical Therapy Treatment Note     Name: Kailee Malone  Clinic Number: [id number]    Therapy Diagnosis:   Encounter Diagnoses   Name Primary?    Impaired functional mobility, balance, gait, and endurance Yes    Muscle weakness of lower extremity        Physician: Raf Villa FNP    Visit Date: 5/14/2024    Therapy Diagnosis: decreased balance .  Physician: Raf Villa FNP     Physician Orders: PT Eval and Treat    Medical Diagnosis from Referral: gait and balance difficulty   Evaluation Date: 4/22/2024  Updated Plan of Care Due : 6/22/2024   Authorization Period Expiration: humana   Plan of Care Expiration: 6/23/2024  Visit # / Visits authorized: 7/ 9   PTA Visit #: 4    Time In: 235  Time Out: 320  Total Billable Time: 45 minutes    Precautions: Standard       Subjective     Pt reports: I almost fell twice out in the yard yesterday, I just don't lift my feet up enough.  She was compliant with home exercise program.     Pain: 0/10  Location: bilateral lower extremity weakness       Objective      Kailee participated in dynamic functional therapeutic activities to improve functional performance for 25 minutes, including:    Biking x 8min  sp02 dropping to 92%  Ube x 5 min  Spo2 96%  Up/down 2 steps x 20 with upper extremity support  Bilateral ankle dorsiflexion blue theraband x 15  Bilateral hip adduction with bolster and bridging x 10  Double support leg presses 20 x 80#  Double support calf presses 20 x 80#     Patient participated in neuromuscular re-education activities to improve: Coordination, Kinesthetic, and Proprioception for 20 minutes. The following activities were included:   Slant board x 2'  Bilateral side lying abduction x 10   Toe walking x 50' with CONTACT GUARD ASSISTANCE X 1  Heel walking x 50' WITH CONTACT GUARD ASSISTANCE  X 1  High knee walking x 50'  "   "        "         "        Dorsiflexion    right      101          left   11  Inversion        right     30            left "   25  Eversion         right     20          left   20  Hip abduction  right   16            left 12  Home Exercises Provided and Patient Education Provided     Education provided: home exercise program     Written Home Exercises Provided: Patient instructed to cont prior HEP.  Exercises were reviewed and Kailee was able to demonstrate them prior to the end of the session.  Kailee demonstrated good  understanding of the education provided.     See EMR under Patient Instructions for exercises provided prior visit.    Assessment   Pt has lob with heel walking, was able to perform toe walking without difficulty.  Kailee Is progressing well towards her goals.   Pt prognosis is Excellent.     Pt will continue to benefit from skilled outpatient physical therapy to address the deficits listed in the problem list box on initial evaluation, provide pt/family education and to maximize pt's level of independence in the home and community environment.     Pt's spiritual, cultural and educational needs considered and pt agreeable to plan of care and goals.     Anticipated Barriers for therapy: Physical Therapy with a medical diagnosis of decreased balance/weakness . Patient presents with decresaed balance with hx of falling . Pt presents with weakness bilateral proximal hips and bilateral ankle weakness .      Goals:  Short Term Goals: 4 weeks   Pt will be independent with home ex program-met   Pt will increase all lower extremity strength to 4/5  Pt will increase bilateral ankle inversion to 45 degrees arom   Increase bilateral ankle eversion to 20  degrees arom   Increase hip abd to 20 degree arom      Long Term Goals: 8  weeks   Pt will be able to walk 1/2 mile pain free  Pt will be able to increase strength to 5/5   Pt will be able to increase balance to good       Plan   Plan of care Certification: 4/22/2024 to 6/23/2024.     Outpatient Physical Therapy 2 times weekly for 8 weeks to include the following interventions:  Moist Heat/ Ice, Neuromuscular Re-ed, Patient Education, Therapeutic Activities, and Therapeutic Exercise.      Plan of care has been reestablished with Carol HENSON, Esther Guajardo LPTA, Cherelle Austin LPTA  and Briana Mahmood LPTA.      Lucero Peña, PTA  5/14/2024

## 2024-05-16 ENCOUNTER — CLINICAL SUPPORT (OUTPATIENT)
Dept: REHABILITATION | Facility: HOSPITAL | Age: 79
End: 2024-05-16
Payer: MEDICARE

## 2024-05-16 DIAGNOSIS — M62.81 MUSCLE WEAKNESS OF LOWER EXTREMITY: Primary | ICD-10-CM

## 2024-05-16 DIAGNOSIS — R29.898 DECREASED STRENGTH OF LOWER EXTREMITY: ICD-10-CM

## 2024-05-16 DIAGNOSIS — Z74.09 IMPAIRED FUNCTIONAL MOBILITY, BALANCE, GAIT, AND ENDURANCE: ICD-10-CM

## 2024-05-16 PROCEDURE — 97530 THERAPEUTIC ACTIVITIES: CPT | Mod: PN,CQ

## 2024-05-16 PROCEDURE — 97112 NEUROMUSCULAR REEDUCATION: CPT | Mod: PN,CQ

## 2024-05-16 NOTE — PROGRESS NOTES
"  Physical Therapy Treatment Note     Name: Kailee Malone  Clinic Number: [id number]    Therapy Diagnosis:   Encounter Diagnoses   Name Primary?    Muscle weakness of lower extremity Yes    Impaired functional mobility, balance, gait, and endurance     Decreased strength of lower extremity        Physician: Raf Villa FNP    Visit Date: 5/16/2024    Therapy Diagnosis: decreased balance .  Physician: Raf Villa FNP     Physician Orders: PT Eval and Treat    Medical Diagnosis from Referral: gait and balance difficulty   Evaluation Date: 4/22/2024  Updated Plan of Care Due : 6/22/2024   Authorization Period Expiration: humana   Plan of Care Expiration: 6/23/2024  Visit # / Visits authorized: 8/ 9   PTA Visit #: 5    Time In: 1235  Time Out: 1315  Total Billable Time: 40 minutes    Precautions: Standard       Subjective     Pt reports: I am going to the pool as quick as I leave here. No new complaints voiced.  She was compliant with home exercise program.     Pain: 0/10  Location: bilateral lower extremity weakness       Objective      Kailee participated in dynamic functional therapeutic activities to improve functional performance for 25 minutes, including:    Biking x 8min  sp02 dropping to 92%  Ube x 5.5 min  Spo2 96%  Up/down 2 steps x 20 with upper extremity support  Bilateral ankle dorsiflexion blue theraband x 15  Bilateral hip adduction with bolster and bridging x 10  Double support leg presses 20 x 80#  Double support calf presses 20 x 80#     Patient participated in neuromuscular re-education activities to improve: Coordination, Kinesthetic, and Proprioception for 20 minutes. The following activities were included:   Slant board x 2'  Bilateral side lying abduction x 10   Sidestepping x 15' with CONTACT GUARD ASSISTANCE X 1  Backward walking x 15' WITH CONTACT GUARD ASSISTANCE  X 1  High knee walking x 15'  "   "        "         "        Dorsiflexion    right      101          left   " 11  Inversion        right     30            left   25  Eversion         right     20          left   20  Hip abduction  right   16            left 12  Home Exercises Provided and Patient Education Provided     Education provided: home exercise program     Written Home Exercises Provided: Patient instructed to cont prior HEP.  Exercises were reviewed and Kailee was able to demonstrate them prior to the end of the session.  Kailee demonstrated good  understanding of the education provided.     See EMR under Patient Instructions for exercises provided prior visit.    Assessment   Pt has lob with heel walking, was able to perform toe walking without difficulty.  Kailee Is progressing well towards her goals.   Pt prognosis is Excellent.     Pt will continue to benefit from skilled outpatient physical therapy to address the deficits listed in the problem list box on initial evaluation, provide pt/family education and to maximize pt's level of independence in the home and community environment.     Pt's spiritual, cultural and educational needs considered and pt agreeable to plan of care and goals.     Anticipated Barriers for therapy: Physical Therapy with a medical diagnosis of decreased balance/weakness . Patient presents with decresaed balance with hx of falling . Pt presents with weakness bilateral proximal hips and bilateral ankle weakness .      Goals:  Short Term Goals: 4 weeks   Pt will be independent with home ex program-met   Pt will increase all lower extremity strength to 4/5  Pt will increase bilateral ankle inversion to 45 degrees arom   Increase bilateral ankle eversion to 20  degrees arom   Increase hip abd to 20 degree arom      Long Term Goals: 8  weeks   Pt will be able to walk 1/2 mile pain free  Pt will be able to increase strength to 5/5   Pt will be able to increase balance to good       Plan   Plan of care Certification: 4/22/2024 to 6/23/2024.     Outpatient Physical Therapy 2 times weekly for  8 weeks to include the following interventions: Moist Heat/ Ice, Neuromuscular Re-ed, Patient Education, Therapeutic Activities, and Therapeutic Exercise.      Plan of care has been reestablished with Carol HENSON, Esther HENSON, Cherelle HENSON  and Briana HENSON.      Cherelle Austin, PTA  5/16/2024

## 2024-05-21 ENCOUNTER — CLINICAL SUPPORT (OUTPATIENT)
Dept: REHABILITATION | Facility: HOSPITAL | Age: 79
End: 2024-05-21
Payer: MEDICARE

## 2024-05-21 DIAGNOSIS — R29.898 DECREASED STRENGTH OF LOWER EXTREMITY: ICD-10-CM

## 2024-05-21 DIAGNOSIS — Z74.09 IMPAIRED FUNCTIONAL MOBILITY, BALANCE, GAIT, AND ENDURANCE: ICD-10-CM

## 2024-05-21 DIAGNOSIS — M62.81 MUSCLE WEAKNESS OF LOWER EXTREMITY: Primary | ICD-10-CM

## 2024-05-21 PROCEDURE — 97112 NEUROMUSCULAR REEDUCATION: CPT | Mod: PN,CQ

## 2024-05-21 PROCEDURE — 97530 THERAPEUTIC ACTIVITIES: CPT | Mod: PN,CQ

## 2024-05-21 NOTE — PLAN OF CARE
Physical Therapy Treatment Note      Name: Kailee Malone  Clinic Number: [id number]     Therapy Diagnosis:        Encounter Diagnoses   Name Primary?    Muscle weakness of lower extremity Yes    Impaired functional mobility, balance, gait, and endurance      Decreased strength of lower extremity           Physician: Raf Villa FNP     Visit Date: 5/21/2024     Therapy Diagnosis: decreased balance .  Physician: Raf Villa FNP     Physician Orders: PT Eval and Treat    Medical Diagnosis from Referral: gait and balance difficulty   Evaluation Date: 4/22/2024  Updated Plan of Care Due : 6/22/2024   Authorization Period Expiration: humana   Plan of Care Expiration: 6/23/2024  Visit # / Visits authorized: 9/ 9   PTA Visit #: 5     Time In: 804  Time Out: 844  Total Billable Time: 40 minutes     Precautions: Standard     Subjective      Pt reports: My balance is a little better than it was when I started  She was compliant with home exercise program.     Pain: 0/10  Location: bilateral lower extremity weakness        Objective      Kailee participated in dynamic functional therapeutic activities to improve functional performance for 25 minutes, including:     Biking x 8 min  sp02 dropping to 95%  Ube x 5.5 min  Spo2 96%  Up/down 2 steps x 20 with upper extremity support  Bilateral ankle dorsiflexion blue theraband x 15  Bilateral hip adduction with bolster and bridging x 10  Double support leg presses 20 x 80#  Double support calf presses 20 x 80#      Patient participated in neuromuscular re-education activities to improve: Coordination, Kinesthetic, and Proprioception for 15 minutes. The following activities were included:   Slant board x 2'  Bilateral side lying abduction x 10   Bilateral single support balance x 5  Standing calf raises x 10         Dorsiflexion    right      10          left   11  Inversion        right     30           left   25  Eversion         right     20          left   20  Hip  abduction  right   18            left 12     Home Exercises Provided and Patient Education Provided      Education provided: home exercise program      Written Home Exercises Provided: Patient instructed to cont prior HEP.  Exercises were reviewed and Kailee was able to demonstrate them prior to the end of the session.  Kailee demonstrated good  understanding of the education provided.      See EMR under Patient Instructions for exercises provided prior visit.     Assessment   Pt has progressed well with treatment and instructed to be consistent with home exercise program   Kailee Is progressing well towards her goals.   Pt prognosis is Excellent.      Pt will continue to benefit from skilled outpatient physical therapy to address the deficits listed in the problem list box on initial evaluation, provide pt/family education and to maximize pt's level of independence in the home and community environment.      Pt's spiritual, cultural and educational needs considered and pt agreeable to plan of care and goals.     Anticipated Barriers for therapy: Physical Therapy with a medical diagnosis of decreased balance/weakness . Patient presents with decresaed balance with hx of falling . Pt presents with weakness bilateral proximal hips and bilateral ankle weakness .      Goals:  Short Term Goals: 4 weeks   Pt will be independent with home ex program-met   Pt will increase all lower extremity strength to 4/5-met  Pt will increase bilateral ankle inversion to 45 degrees arom   Increase bilateral ankle eversion to 20  degrees active range of motion-met   Increase hip abd to 20 degree arom      Long Term Goals: 8  weeks   Pt will be able to walk 1/2 mile pain free-met  Pt will be able to increase strength to 5/5   Pt will be able to increase balance to good       Plan     Outpatient Therapy Discharge Summary     Name: Kailee Malone  Clinic Number: [id number]    Therapy Diagnosis:   Encounter Diagnoses   Name Primary?     Muscle weakness of lower extremity Yes    Impaired functional mobility, balance, gait, and endurance     Decreased strength of lower extremity      Physician: Raf Villa, ZANA         Assessment    Goals:  Pt met goals doing much better     Discharge reason: Patient has met all of his/her goals    Plan   This patient is discharged from Physical Therapy.    Chase Martinez, PT

## 2024-05-21 NOTE — PROGRESS NOTES
Physical Therapy Treatment Note     Name: Kailee Malone  Clinic Number: [id number]    Therapy Diagnosis:   Encounter Diagnoses   Name Primary?    Muscle weakness of lower extremity Yes    Impaired functional mobility, balance, gait, and endurance     Decreased strength of lower extremity        Physician: Raf Villa FNP    Visit Date: 5/21/2024    Therapy Diagnosis: decreased balance .  Physician: Raf Villa FNP     Physician Orders: PT Eval and Treat    Medical Diagnosis from Referral: gait and balance difficulty   Evaluation Date: 4/22/2024  Updated Plan of Care Due : 6/22/2024   Authorization Period Expiration: humana   Plan of Care Expiration: 6/23/2024  Visit # / Visits authorized: 9/ 9   PTA Visit #: 5    Time In: 804  Time Out: 844  Total Billable Time: 40 minutes    Precautions: Standard     Subjective     Pt reports: My balance is a little better than it was when I started  She was compliant with home exercise program.     Pain: 0/10  Location: bilateral lower extremity weakness       Objective      Kailee participated in dynamic functional therapeutic activities to improve functional performance for 25 minutes, including:    Biking x 8 min  sp02 dropping to 95%  Ube x 5.5 min  Spo2 96%  Up/down 2 steps x 20 with upper extremity support  Bilateral ankle dorsiflexion blue theraband x 15  Bilateral hip adduction with bolster and bridging x 10  Double support leg presses 20 x 80#  Double support calf presses 20 x 80#     Patient participated in neuromuscular re-education activities to improve: Coordination, Kinesthetic, and Proprioception for 15 minutes. The following activities were included:   Slant board x 2'  Bilateral side lying abduction x 10   Bilateral single support balance x 5  Standing calf raises x 10        Dorsiflexion    right      10          left   11  Inversion        right     30           left   25  Eversion         right     20          left   20  Hip abduction  right    18            left 12    Home Exercises Provided and Patient Education Provided     Education provided: home exercise program     Written Home Exercises Provided: Patient instructed to cont prior HEP.  Exercises were reviewed and Kailee was able to demonstrate them prior to the end of the session.  Kailee demonstrated good  understanding of the education provided.     See EMR under Patient Instructions for exercises provided prior visit.    Assessment   Pt has progressed well with treatment and instructed to be consistent with home exercise program   Kailee Is progressing well towards her goals.   Pt prognosis is Excellent.     Pt will continue to benefit from skilled outpatient physical therapy to address the deficits listed in the problem list box on initial evaluation, provide pt/family education and to maximize pt's level of independence in the home and community environment.     Pt's spiritual, cultural and educational needs considered and pt agreeable to plan of care and goals.     Anticipated Barriers for therapy: Physical Therapy with a medical diagnosis of decreased balance/weakness . Patient presents with decresaed balance with hx of falling . Pt presents with weakness bilateral proximal hips and bilateral ankle weakness .      Goals:  Short Term Goals: 4 weeks   Pt will be independent with home ex program-met   Pt will increase all lower extremity strength to 4/5-met  Pt will increase bilateral ankle inversion to 45 degrees arom   Increase bilateral ankle eversion to 20  degrees active range of motion-met   Increase hip abd to 20 degree arom      Long Term Goals: 8  weeks   Pt will be able to walk 1/2 mile pain free-met  Pt will be able to increase strength to 5/5   Pt will be able to increase balance to good       Plan   Will d/c to home exercise program, see physical therapist d/c note.     Lucero Peña, PTA  5/21/2024

## 2024-07-05 ENCOUNTER — OFFICE VISIT (OUTPATIENT)
Dept: FAMILY MEDICINE | Facility: CLINIC | Age: 79
End: 2024-07-05
Payer: MEDICARE

## 2024-07-05 VITALS
DIASTOLIC BLOOD PRESSURE: 76 MMHG | OXYGEN SATURATION: 96 % | TEMPERATURE: 98 F | HEART RATE: 70 BPM | RESPIRATION RATE: 18 BRPM | WEIGHT: 197 LBS | SYSTOLIC BLOOD PRESSURE: 119 MMHG | BODY MASS INDEX: 32.82 KG/M2 | HEIGHT: 65 IN

## 2024-07-05 DIAGNOSIS — Z12.31 BREAST CANCER SCREENING BY MAMMOGRAM: Primary | ICD-10-CM

## 2024-07-05 DIAGNOSIS — L03.90 CELLULITIS, UNSPECIFIED CELLULITIS SITE: Primary | ICD-10-CM

## 2024-07-05 RX ORDER — DOXYCYCLINE 100 MG/1
100 CAPSULE ORAL EVERY 12 HOURS
Qty: 14 CAPSULE | Refills: 0 | Status: SHIPPED | OUTPATIENT
Start: 2024-07-05 | End: 2024-07-12

## 2024-07-05 RX ORDER — LINCOMYCIN HYDROCHLORIDE 300 MG/ML
600 INJECTION, SOLUTION INTRAMUSCULAR; INTRAVENOUS; SUBCONJUNCTIVAL
Status: COMPLETED | OUTPATIENT
Start: 2024-07-05 | End: 2024-07-05

## 2024-07-05 RX ORDER — PROPRANOLOL HYDROCHLORIDE 60 MG/1
TABLET ORAL
COMMUNITY
Start: 2024-07-02

## 2024-07-05 RX ADMIN — LINCOMYCIN HYDROCHLORIDE 600 MG: 300 INJECTION, SOLUTION INTRAMUSCULAR; INTRAVENOUS; SUBCONJUNCTIVAL at 11:07

## 2024-07-05 NOTE — PROGRESS NOTES
ZANA Roberts   [address] [address] [address] [address]  [place]  [address]      PATIENT NAME: Kailee Malone  : 1945  DATE: 24  MRN: [id number]      Billing Provider: ZANA Roberts  Level of Service:   Patient PCP Information       Provider PCP Type    Caden Bradford MD General            Reason for Visit / Chief Complaint: Skin Problem (LLE has reddened areas./Noticed for approx 3 days now./Denies itching./States it doesn't hurt but is tender to touch./Is warm to touch.) and Edema (LLE)       Update PCP  Update Chief Complaint         History of Present Illness / Problem Focused Workflow     Kailee Malone presents to the clinic with Skin Problem (LLE has reddened areas./Noticed for approx 3 days now./Denies itching./States it doesn't hurt but is tender to touch./Is warm to touch.) and Edema (LLE)     Patient presents to clinic with c/o tender rash and swelling to left lower leg x 2 days.         Review of Systems     @Review of Systems   Constitutional:  Negative for fatigue and fever.   HENT:  Negative for nasal congestion, ear pain, postnasal drip, rhinorrhea and sinus pressure/congestion.    Eyes:  Negative for discharge and itching.   Respiratory:  Negative for chest tightness, shortness of breath and wheezing.    Cardiovascular:  Negative for chest pain and palpitations.   Gastrointestinal:  Negative for abdominal pain, blood in stool and change in bowel habit.   Genitourinary:  Negative for dysuria.   Musculoskeletal:  Negative for joint swelling.   Integumentary:  Positive for rash.   Neurological:  Negative for dizziness and headaches.       Medical / Social / Family History     Past Medical History:   Diagnosis Date    Acute gastric ulcer without hemorrhage or perforation 2022    Acute superficial gastritis without hemorrhage 2022    Kirkpatrick's esophagus without dysplasia 2022    Diverticula, colon 10/04/2021    HH (hiatus hernia) 2022    History of  colon polyps 10/04/2021    Hypertension     Screening for colon cancer 10/04/2021       Past Surgical History:   Procedure Laterality Date    ARTHROSCOPY OF KNEE Right 05/24/2021    Procedure: ARTHROSCOPY, KNEE;  Surgeon: Kenny Valenzuela III, MD;  Location: [facility];  Service: Orthopedics;  Laterality: Right;    BREAST BIOPSY      BREAST SURGERY      lumpectomy    HYSTERECTOMY      KNEE ARTHROSCOPY W/ MENISCECTOMY Right 05/24/2021    Procedure: ARTHROSCOPY, KNEE, WITH MENISCECTOMY;  Surgeon: Kenny Valenzuela III, MD;  Location: [facility] OR;  Service: Orthopedics;  Laterality: Right;  medial menisectomy    OOPHORECTOMY      RT WRIST         Social History  Ms.  reports that she has never smoked. She has been exposed to tobacco smoke. She has never used smokeless tobacco. She reports that she does not drink alcohol and does not use drugs.    Family History  Ms.'s family history includes Breast cancer in her maternal aunt and mother.    Medications and Allergies     Medications  Outpatient Medications Marked as Taking for the 7/5/24 encounter (Office Visit) with Claudia Aguirre FNP   Medication Sig Dispense Refill    alendronate (FOSAMAX) 70 MG tablet Take 70 mg by mouth every 7 days.      arformoteroL (BROVANA) 15 mcg/2 mL Nebu 2 mL Inhalation Twice a day      chlorthalidone (HYGROTEN) 25 MG Tab TAKE 1 TABLET BY MOUTH IN THE MORNING WITH FOOD      colestipoL (COLESTID) 1 gram Tab Take 1 tablet by mouth twice daily 180 tablet 0    ergocalciferol (ERGOCALCIFEROL) 50,000 unit Cap Take 1 capsule (50,000 Units total) by mouth every 30 days. Take one capsule weekly for 12 weeks then reduce to one capsule monthly 3 capsule 3    EScitalopram oxalate (LEXAPRO) 20 MG tablet Take 20 mg by mouth.      gabapentin (NEURONTIN) 300 MG capsule       hydrOXYzine HCL (ATARAX) 25 MG tablet Take 25 mg by mouth every 6 (six) hours as needed.      ketoconazole (NIZORAL) 2 % cream APPLY TO AFFECTED AREAS TWICE DAILY UNTIL CLEAR       ketoconazole (NIZORAL) 2 % shampoo APPLY ON BACK LEAVE ON FOR 5 MINUTES THEN RINSE, 2 TO 3 TIMES WEEKLY      lansoprazole (PREVACID) 30 MG capsule Take 1 capsule (30 mg total) by mouth once daily. 90 capsule 3    propranoloL (INDERAL) 60 MG tablet       rOPINIRole (REQUIP) 0.25 MG tablet       SENNA PLUS 8.6-50 mg per tablet TAKE ONE TO TWO TABLETS BY MOUTH EVERYDAY WHILE TAKING NARCOTIC PAIN MEDS TO PREVENT CONSTIPATION      [DISCONTINUED] propranoloL (INDERAL) 40 MG tablet Take 1 tablet (40 mg total) by mouth 3 (three) times daily. 90 tablet 11     Current Facility-Administered Medications for the 7/5/24 encounter (Office Visit) with Claudia Aguirre FNP   Medication Dose Route Frequency Provider Last Rate Last Admin    [COMPLETED] lincomycin injection 600 mg  600 mg Intramuscular 1 time in Clinic/HOD Claudia Aguirre FNP   600 mg at 07/05/24 1125       Allergies  Review of patient's allergies indicates:   Allergen Reactions    Povidone-iodine Other (See Comments)    Povidone-iodine-ethyl alcohol Other (See Comments)     BURN    Penicillins Rash and Hives       Physical Examination     Vitals:    07/05/24 1049   BP: 119/76   Pulse: 70   Resp: 18   Temp: 97.9 °F (36.6 °C)     Physical Exam  Constitutional:       General: She is not in acute distress.     Appearance: Normal appearance.   Cardiovascular:      Rate and Rhythm: Normal rate and regular rhythm.   Pulmonary:      Effort: Pulmonary effort is normal. No respiratory distress.      Breath sounds: Normal breath sounds. No wheezing, rhonchi or rales.   Skin:     General: Skin is warm and dry.      Findings: Rash present.          Neurological:      Mental Status: She is alert.   Psychiatric:         Mood and Affect: Mood normal.               Lab Results   Component Value Date    WBC 8.5 03/23/2023    HGB 12.8 03/23/2023    HCT 39.3 03/23/2023    MCV 91.3 01/18/2023     03/23/2023          Sodium   Date Value Ref Range Status   01/18/2023 136 136 - 145  mmol/L Final     Potassium   Date Value Ref Range Status   01/18/2023 4.4 3.5 - 5.1 mmol/L Final     Chloride   Date Value Ref Range Status   01/18/2023 99 98 - 107 mmol/L Final     CO2   Date Value Ref Range Status   01/18/2023 30 21 - 32 mmol/L Final     Glucose   Date Value Ref Range Status   01/18/2023 99 74 - 106 mg/dL Final     BUN   Date Value Ref Range Status   01/18/2023 22 (H) 7 - 18 mg/dL Final     Creatinine   Date Value Ref Range Status   01/18/2023 0.95 0.55 - 1.02 mg/dL Final     Calcium   Date Value Ref Range Status   01/18/2023 9.9 8.5 - 10.1 mg/dL Final     Total Protein   Date Value Ref Range Status   01/18/2023 8.0 6.4 - 8.2 g/dL Final     Albumin   Date Value Ref Range Status   01/18/2023 4.1 3.5 - 5.0 g/dL Final     Bilirubin, Total   Date Value Ref Range Status   01/18/2023 0.4 >0.0 - 1.2 mg/dL Final     Alk Phos   Date Value Ref Range Status   01/18/2023 31 (L) 55 - 142 U/L Final     AST   Date Value Ref Range Status   01/18/2023 37 15 - 37 U/L Final     ALT   Date Value Ref Range Status   01/18/2023 52 13 - 56 U/L Final     Anion Gap   Date Value Ref Range Status   01/18/2023 11 7 - 16 mmol/L Final     eGFR   Date Value Ref Range Status   04/11/2022 85 >=60 mL/min/1.73m² Final      Mammo Digital Screening Bilat  Narrative: Result:   Mammo Digital Screening Bilat     History:  Patient is 78 y.o. and is seen for a screening mammogram.    Films Compared:  Prior images (if available) were compared.     Findings:   The breasts have scattered areas of fibroglandular density. There is no   evidence of suspicious masses, calcifications, or other abnormal findings.  Impression: Bilateral  There is no mammographic evidence of malignancy.    BI-RADS Category:   Overall: 1 - Negative       Recommendation:  Routine screening mammogram in 1 year, or as clinically indicated.    Your estimated lifetime risk of breast cancer (to age 85) based on   Tyrer-Cuzick risk assessment model is Tyrer-Cuzick: 4.22 %.  According to   the American Cancer Society, patients with a lifetime breast cancer risk   of 20% or higher might benefit from supplemental screening tests.     Procedures   Assessment and Plan (including Health Maintenance)      Problem List  Smart Sets  Document Outside HM   :    Plan:           Problem List Items Addressed This Visit          ID    Cellulitis - Primary    Current Assessment & Plan     Injection given in clinic  Started patient on doxycycline- discussed use and side effects  Increase fluids  Monitor for worsening side effects         Relevant Medications    doxycycline (VIBRAMYCIN) 100 MG Cap    lincomycin injection 600 mg (Completed)       Health Maintenance Topics with due status: Not Due       Topic Last Completion Date    Lipid Panel 09/29/2021    Colonoscopy 10/04/2021    DEXA Scan 04/13/2022    Influenza Vaccine 11/03/2023       Future Appointments   Date Time Provider Department Center   7/9/2024  8:40 AM Kyra Martines, NP [facility]   8/27/2024 11:20 AM [facility]   4/14/2025  1:30 PM Raf Villa FNP OB [facility]        Health Maintenance Due   Topic Date Due    Pneumococcal Vaccines (Age 65+) (1 of 2 - PCV) Never done    TETANUS VACCINE  Never done    Shingles Vaccine (1 of 2) Never done    RSV Vaccine (Age 60+ and Pregnant patients) (1 - 1-dose 60+ series) Never done    Mammogram  08/22/2024        Follow up if symptoms worsen or fail to improve.     Signature:  ZANA Roberts  [facility]  [address] [place] [address], [place]  [address]    Date of encounter: 7/5/24

## 2024-07-05 NOTE — PROGRESS NOTES
Health Maintenance Due   Topic Date Due    Pneumococcal Vaccines (Age 65+) (1 of 2 - PCV) Never done    TETANUS VACCINE  Never done    Shingles Vaccine (1 of 2) Never done    RSV Vaccine (Age 60+ and Pregnant patients) (1 - 1-dose 60+ series) Never done    Mammogram  08/22/2024     Discussed care gaps.  Not interested in vaccs/mammo.  Will further discuss w/ her pcp.

## 2024-07-05 NOTE — ASSESSMENT & PLAN NOTE
Injection given in clinic  Started patient on doxycycline- discussed use and side effects  Increase fluids  Monitor for worsening side effects

## 2024-08-27 ENCOUNTER — HOSPITAL ENCOUNTER (OUTPATIENT)
Dept: RADIOLOGY | Facility: HOSPITAL | Age: 79
Discharge: HOME | End: 2024-08-27
Attending: FAMILY MEDICINE
Payer: MEDICARE

## 2024-08-27 VITALS — WEIGHT: 195 LBS | HEIGHT: 65 IN | BODY MASS INDEX: 32.49 KG/M2

## 2024-08-27 DIAGNOSIS — Z12.31 BREAST CANCER SCREENING BY MAMMOGRAM: ICD-10-CM

## 2024-08-27 PROCEDURE — 77067 SCR MAMMO BI INCL CAD: CPT | Mod: TC

## 2024-10-08 ENCOUNTER — OFFICE VISIT (OUTPATIENT)
Dept: GASTROENTEROLOGY | Facility: CLINIC | Age: 79
End: 2024-10-08
Payer: MEDICARE

## 2024-10-08 VITALS
OXYGEN SATURATION: 93 % | SYSTOLIC BLOOD PRESSURE: 147 MMHG | WEIGHT: 199.81 LBS | BODY MASS INDEX: 33.29 KG/M2 | DIASTOLIC BLOOD PRESSURE: 85 MMHG | HEART RATE: 69 BPM | HEIGHT: 65 IN

## 2024-10-08 DIAGNOSIS — K86.89 PANCREATIC INSUFFICIENCY: ICD-10-CM

## 2024-10-08 DIAGNOSIS — K22.70 BARRETT'S ESOPHAGUS WITHOUT DYSPLASIA: ICD-10-CM

## 2024-10-08 DIAGNOSIS — K76.0 NONALCOHOLIC FATTY LIVER DISEASE: ICD-10-CM

## 2024-10-08 DIAGNOSIS — K44.9 HH (HIATUS HERNIA): ICD-10-CM

## 2024-10-08 DIAGNOSIS — R74.8 ELEVATED LIVER ENZYMES: Primary | ICD-10-CM

## 2024-10-08 DIAGNOSIS — K59.00 CONSTIPATION, UNSPECIFIED CONSTIPATION TYPE: ICD-10-CM

## 2024-10-08 DIAGNOSIS — K76.0 FATTY LIVER: Primary | ICD-10-CM

## 2024-10-08 PROBLEM — R19.7 DIARRHEA: Status: RESOLVED | Noted: 2023-02-21 | Resolved: 2024-10-08

## 2024-10-08 PROCEDURE — 3077F SYST BP >= 140 MM HG: CPT | Mod: CPTII,,, | Performed by: NURSE PRACTITIONER

## 2024-10-08 PROCEDURE — 99214 OFFICE O/P EST MOD 30 MIN: CPT | Mod: S$PBB,,, | Performed by: NURSE PRACTITIONER

## 2024-10-08 PROCEDURE — 3079F DIAST BP 80-89 MM HG: CPT | Mod: CPTII,,, | Performed by: NURSE PRACTITIONER

## 2024-10-08 PROCEDURE — 99999 PR PBB SHADOW E&M-EST. PATIENT-LVL V: CPT | Mod: PBBFAC,,, | Performed by: NURSE PRACTITIONER

## 2024-10-08 PROCEDURE — 1159F MED LIST DOCD IN RCRD: CPT | Mod: CPTII,,, | Performed by: NURSE PRACTITIONER

## 2024-10-08 PROCEDURE — 99215 OFFICE O/P EST HI 40 MIN: CPT | Mod: PBBFAC | Performed by: NURSE PRACTITIONER

## 2024-10-08 PROCEDURE — 1160F RVW MEDS BY RX/DR IN RCRD: CPT | Mod: CPTII,,, | Performed by: NURSE PRACTITIONER

## 2024-10-08 NOTE — PROGRESS NOTES
Kailee Malone is a 79 y.o. female here for Follow-up (Constipation)        PCP: Duane Mayorga  Referring Provider: No referring provider defined for this encounter.     HPI:  Presents for six-month follow-up due to fatty liver and pancreatic insufficiency.  Patient reports that last liver ultrasound was September 20, 2023 but report is not available for review.  Ultrasound from 07/18/2022 shows fatty liver.  We have discussed weight loss and a diet low in saturated fat.  Exercise is limited due to the patient's asthma and shortness of breath.  Stool for pancreatic elastase on 01/25/2023 was decreased at 104.  Was unable to take Creon.  Has been taking Colestid twice daily for diarrhea.  Patient reports that she is now having constipation.  I have advised her to stop Colestid.  She denies any hematochezia or melena.  Last colonoscopy was 10/04/2021, report reviewed, recommendation per report is to repeat colonoscopy in 5 years.  EGD on 02/23/2022, report reviewed, mild gastritis, H pylori is negative, mild reflux, intestinal metaplasia with Barretts esophagus.  She is taking Prevacid for heartburn and reflux symptoms.  States that Prevacid has improved her symptoms and they are controlled.  Denies any dysphagia.    Follow-up  Pertinent negatives include no abdominal pain, change in bowel habit, chest pain, fatigue, fever, joint swelling, nausea or vomiting.         ROS:  Review of Systems   Constitutional:  Negative for appetite change, fatigue, fever and unexpected weight change.   HENT:  Negative for trouble swallowing.    Respiratory:  Negative for shortness of breath.    Cardiovascular:  Negative for chest pain.   Gastrointestinal:  Negative for abdominal pain, blood in stool, change in bowel habit, constipation, diarrhea, nausea, vomiting, reflux and fecal incontinence.   Musculoskeletal:  Negative for gait problem and joint swelling.   Integumentary:  Negative for pallor.   Neurological:   Negative for light-headedness.   Hematological:  Does not bruise/bleed easily.   Psychiatric/Behavioral:  The patient is not nervous/anxious.           PMHX:  has a past medical history of Acute gastric ulcer without hemorrhage or perforation (02/03/2022), Acute superficial gastritis without hemorrhage (02/03/2022), Kirkpatrick's esophagus without dysplasia (02/04/2022), Diverticula, colon (10/04/2021), HH (hiatus hernia) (02/03/2022), History of colon polyps (10/04/2021), Hypertension, and Screening for colon cancer (10/04/2021).    PSHX:  has a past surgical history that includes RT WRIST; Hysterectomy; Breast surgery; Arthroscopy of knee (Right, 05/24/2021); Knee arthroscopy w/ meniscectomy (Right, 05/24/2021); Breast biopsy; and Oophorectomy.    PFHX: family history includes Breast cancer in her maternal aunt and mother.    PSlHX:  reports that she has never smoked. She has been exposed to tobacco smoke. She has never used smokeless tobacco. She reports that she does not drink alcohol and does not use drugs.        Review of patient's allergies indicates:   Allergen Reactions    Povidone-iodine Other (See Comments)    Povidone-iodine-ethyl alcohol Other (See Comments)     BURN    Penicillins Rash and Hives       Medication List with Changes/Refills   Current Medications    ALENDRONATE (FOSAMAX) 70 MG TABLET    Take 70 mg by mouth every 7 days.    ARFORMOTEROL (BROVANA) 15 MCG/2 ML NEBU    2 mL Inhalation Twice a day    CHLORTHALIDONE (HYGROTEN) 25 MG TAB    TAKE 1 TABLET BY MOUTH IN THE MORNING WITH FOOD    ERGOCALCIFEROL (ERGOCALCIFEROL) 50,000 UNIT CAP    Take 1 capsule (50,000 Units total) by mouth every 30 days. Take one capsule weekly for 12 weeks then reduce to one capsule monthly    ESCITALOPRAM OXALATE (LEXAPRO) 20 MG TABLET    Take 20 mg by mouth.    GABAPENTIN (NEURONTIN) 300 MG CAPSULE        HYDROXYZINE HCL (ATARAX) 25 MG TABLET    Take 25 mg by mouth every 6 (six) hours as needed.    KETOCONAZOLE  "(NIZORAL) 2 % CREAM    APPLY TO AFFECTED AREAS TWICE DAILY UNTIL CLEAR    KETOCONAZOLE (NIZORAL) 2 % SHAMPOO    APPLY ON BACK LEAVE ON FOR 5 MINUTES THEN RINSE, 2 TO 3 TIMES WEEKLY    LANSOPRAZOLE (PREVACID) 30 MG CAPSULE    Take 1 capsule (30 mg total) by mouth once daily.    MECLIZINE (ANTIVERT) 25 MG TABLET        PROPRANOLOL (INDERAL) 60 MG TABLET        ROPINIROLE (REQUIP) 0.25 MG TABLET        SENNA PLUS 8.6-50 MG PER TABLET    TAKE ONE TO TWO TABLETS BY MOUTH EVERYDAY WHILE TAKING NARCOTIC PAIN MEDS TO PREVENT CONSTIPATION   Discontinued Medications    COLESTIPOL (COLESTID) 1 GRAM TAB    Take 1 tablet by mouth twice daily        Objective Findings:  Vital Signs:  BP (!) 147/85   Pulse 69   Ht 5' 5" (1.651 m)   Wt 90.6 kg (199 lb 12.8 oz)   SpO2 (!) 93%   BMI 33.25 kg/m²  Body mass index is 33.25 kg/m².    Physical Exam:  Physical Exam  Vitals and nursing note reviewed.   Constitutional:       General: She is not in acute distress.     Appearance: Normal appearance.   HENT:      Mouth/Throat:      Mouth: Mucous membranes are moist.   Cardiovascular:      Rate and Rhythm: Normal rate.   Pulmonary:      Effort: Pulmonary effort is normal.   Abdominal:      General: Bowel sounds are normal. There is no distension.      Palpations: Abdomen is soft. There is no mass.      Tenderness: There is no abdominal tenderness.   Skin:     General: Skin is warm and dry.      Coloration: Skin is not jaundiced or pale.   Neurological:      Mental Status: She is alert and oriented to person, place, and time.   Psychiatric:         Mood and Affect: Mood normal.          Labs:  Lab Results   Component Value Date    WBC 8.5 03/23/2023    HGB 12.8 03/23/2023    HCT 39.3 03/23/2023    MCV 91.3 01/18/2023    RDW 12.8 03/23/2023     03/23/2023    LYMPH 36.5 01/18/2023    LYMPH 3.36 01/18/2023    MONO 9.6 (H) 01/18/2023    EOS 0.3 03/23/2023    BASO 0.1 03/23/2023     Lab Results   Component Value Date     01/18/2023 "    K 4.4 01/18/2023    CL 99 01/18/2023    CO2 30 01/18/2023    GLU 99 01/18/2023    BUN 22 (H) 01/18/2023    CREATININE 0.95 01/18/2023    CALCIUM 9.9 01/18/2023    PROT 8.0 01/18/2023    ALBUMIN 4.1 01/18/2023    BILITOT 0.4 01/18/2023    ALKPHOS 31 (L) 01/18/2023    AST 37 01/18/2023    ALT 52 01/18/2023         Imaging: No results found.      Assessment:  Kailee Malone is a 79 y.o. female here with:  1. Fatty liver    2. Constipation, unspecified constipation type    3. Kirkpatrick's esophagus without dysplasia    4. Nonalcoholic fatty liver disease    5. HH (hiatus hernia)    6. Pancreatic insufficiency          Recommendations:  1. Liver ultrasound  2. Exercise 150 minutes per week  Weight loss of 7-10%. Weight loss should be gradual  Diet low in saturated fats and carbohydrates  Good glucose and cholesterol control  3. CBC, CMP  4. Discontinue Colestid if diarrhea returns patient may resume Colestid  5. We will be due for colonoscopy in October of 2025  6. We will also be due for repeat EGD due to Barretts esophagus in intestinal metaplasia in February of 2025  7. Continue Prevacid  8. Avoid spicy, greasy foods  Avoid caffeine, citric acid, chocolate, peppermint, and carbonated drinks  Do not lay down within 3 hours of eating  Increase fluid to 64 ounces daily  Avoid antiinflammatory medications such as motrin, advil, aleve, ibuprofen, and BC powder      Follow up in about 6 months (around 4/8/2025).      Order summary:  Orders Placed This Encounter    US Abdomen Limited    Comprehensive Metabolic Panel    CBC Auto Differential       Thank you for allowing me to participate in the care of Kailee Malone.      ABDIRAHMAN Mercado

## 2024-10-09 ENCOUNTER — TELEPHONE (OUTPATIENT)
Dept: GASTROENTEROLOGY | Facility: CLINIC | Age: 79
End: 2024-10-09
Payer: MEDICARE

## 2024-10-09 NOTE — TELEPHONE ENCOUNTER
Results and recommendations called to patient. Verbalized understanding.            ----- Message from ZANA Jaffe sent at 10/8/2024  4:03 PM CDT -----  Patient has had increasing liver enzymes.  We will need liver labs.  We will have to have a redraw.  Sodium is also 131 which is decreased.  We will need to follow up with primary care.  Anemia noted as well.  Iron studies will be included in liver labs.  She should already be scheduled for liver ultrasound.

## 2024-10-14 ENCOUNTER — TELEPHONE (OUTPATIENT)
Dept: GASTROENTEROLOGY | Facility: CLINIC | Age: 79
End: 2024-10-14
Payer: MEDICARE

## 2024-10-14 NOTE — TELEPHONE ENCOUNTER
Results called to patient. Verbalized understanding.          ----- Message from ZANA Jaffe sent at 10/14/2024  7:47 AM CDT -----  So far labs are acceptable.

## 2024-10-18 ENCOUNTER — HOSPITAL ENCOUNTER (OUTPATIENT)
Dept: RADIOLOGY | Facility: HOSPITAL | Age: 79
Discharge: HOME | End: 2024-10-18
Attending: NURSE PRACTITIONER
Payer: MEDICARE

## 2024-10-18 DIAGNOSIS — K76.0 FATTY LIVER: ICD-10-CM

## 2024-10-18 DIAGNOSIS — K76.0 NONALCOHOLIC FATTY LIVER DISEASE: ICD-10-CM

## 2024-10-18 PROCEDURE — 76705 ECHO EXAM OF ABDOMEN: CPT | Mod: 26,,, | Performed by: RADIOLOGY

## 2024-10-18 PROCEDURE — 76705 ECHO EXAM OF ABDOMEN: CPT | Mod: TC

## 2024-10-21 ENCOUNTER — TELEPHONE (OUTPATIENT)
Dept: GASTROENTEROLOGY | Facility: CLINIC | Age: 79
End: 2024-10-21
Payer: MEDICARE

## 2024-10-21 NOTE — TELEPHONE ENCOUNTER
Results called to patient. Verbalized understanding. Also notified that last bit of labs were negative.             ----- Message from ZANA Jaffe sent at 10/18/2024 12:25 PM CDT -----  Hepatomegaly with fatty infiltration of the liver.  No focal liver lesions.  No gallstones or wall thickening of the gallbladder.

## 2025-01-14 DIAGNOSIS — R25.1 TREMORS OF NERVOUS SYSTEM: Primary | ICD-10-CM

## 2025-01-15 RX ORDER — PRIMIDONE 50 MG/1
50 TABLET ORAL NIGHTLY
Qty: 90 TABLET | Refills: 0 | Status: SHIPPED | OUTPATIENT
Start: 2025-01-15 | End: 2026-01-15

## 2025-04-08 ENCOUNTER — OFFICE VISIT (OUTPATIENT)
Dept: GASTROENTEROLOGY | Facility: CLINIC | Age: 80
End: 2025-04-08
Payer: MEDICARE

## 2025-04-08 VITALS
OXYGEN SATURATION: 94 % | HEART RATE: 63 BPM | HEIGHT: 65 IN | DIASTOLIC BLOOD PRESSURE: 74 MMHG | WEIGHT: 183.38 LBS | SYSTOLIC BLOOD PRESSURE: 117 MMHG | BODY MASS INDEX: 30.55 KG/M2

## 2025-04-08 DIAGNOSIS — K76.0 FATTY LIVER: ICD-10-CM

## 2025-04-08 DIAGNOSIS — K22.70 BARRETT'S ESOPHAGUS WITHOUT DYSPLASIA: Primary | ICD-10-CM

## 2025-04-08 PROCEDURE — 3074F SYST BP LT 130 MM HG: CPT | Mod: CPTII,,, | Performed by: NURSE PRACTITIONER

## 2025-04-08 PROCEDURE — 1160F RVW MEDS BY RX/DR IN RCRD: CPT | Mod: CPTII,,, | Performed by: NURSE PRACTITIONER

## 2025-04-08 PROCEDURE — 1159F MED LIST DOCD IN RCRD: CPT | Mod: CPTII,,, | Performed by: NURSE PRACTITIONER

## 2025-04-08 PROCEDURE — 3078F DIAST BP <80 MM HG: CPT | Mod: CPTII,,, | Performed by: NURSE PRACTITIONER

## 2025-04-08 PROCEDURE — 99214 OFFICE O/P EST MOD 30 MIN: CPT | Mod: PBBFAC | Performed by: NURSE PRACTITIONER

## 2025-04-08 PROCEDURE — 99999 PR PBB SHADOW E&M-EST. PATIENT-LVL IV: CPT | Mod: PBBFAC,,, | Performed by: NURSE PRACTITIONER

## 2025-04-08 PROCEDURE — 99214 OFFICE O/P EST MOD 30 MIN: CPT | Mod: S$PBB,,, | Performed by: NURSE PRACTITIONER

## 2025-04-08 NOTE — PROGRESS NOTES
Kailee Malone is a 80 y.o. female here for Follow-up        PCP: Duane Mayorga  Referring Provider: No referring provider defined for this encounter.     HPI:  Presents for follow up due to fatty liver, pancreatic insufficiency, and Kirkpatrick's esophagus.  Patient had a liver ultrasound in October of 2024.  This did indicate hepatomegaly and fatty liver.  No focal liver lesions.  Autoimmune liver markers were performed in October.  AMA, anti-smooth muscle antibody, KARMA, and IgG are normal.  SPEP is normal.  Iron studies are normal.  Alpha antitrypsin is normal. Hepatitis B surface antibody and Hepatitis B surface antigen negative. Recent labs reviewed from [facility] cardiology, alkaline phos 27, AST 3 31, ALT is 32.  Potassium 3.3, sodium 126.  HGB 11.6 and HCT 33.8.  Patient states that she is being supplemented with sodium tablets per primary care. Modified her diet for weight loss.  Weight is decreased from 291 lbs in February 280 lb today.  Does not drink alcohol.Stool for pancreatic elastase on 01/25/2023 was decreased at 104. Was unable to take Creon.  Has been taking Colestid twice daily for diarrhea. Patient has stopped Colestid due to constipation. Patient has a history of Barretts esophagus. Last EGD was 02/23/2022, mild gastritis, negative for H pylori, intestinal metaplasia with Barretts esophagus. Is currently on Prevacid. Reports that reflux symptoms are controlled. Discussed repeat EGD. Patient agrees to schedule.    Follow-up  Associated symptoms include neck pain. Pertinent negatives include no abdominal pain, change in bowel habit, fatigue, fever, joint swelling, nausea or vomiting.         ROS:  Review of Systems   Constitutional:  Positive for unexpected weight change (Intentional weight loss). Negative for appetite change, fatigue and fever.   HENT:  Negative for trouble swallowing.    Gastrointestinal:  Positive for constipation, diarrhea and reflux. Negative for abdominal pain, blood  in stool, change in bowel habit, nausea and vomiting.   Musculoskeletal:  Positive for neck pain. Negative for gait problem and joint swelling.   Integumentary:  Negative for pallor.   Psychiatric/Behavioral:  The patient is not nervous/anxious.           PMHX:  has a past medical history of Acute gastric ulcer without hemorrhage or perforation (02/03/2022), Acute superficial gastritis without hemorrhage (02/03/2022), Kirkpatrick's esophagus without dysplasia (02/04/2022), Diverticula, colon (10/04/2021), HH (hiatus hernia) (02/03/2022), History of colon polyps (10/04/2021), Hypertension, and Screening for colon cancer (10/04/2021).    PSHX:  has a past surgical history that includes RT WRIST; Hysterectomy; Breast surgery; Arthroscopy of knee (Right, 05/24/2021); Knee arthroscopy w/ meniscectomy (Right, 05/24/2021); Breast biopsy; and Oophorectomy.    PFHX: family history includes Breast cancer in her maternal aunt and mother.    PSlHX:  reports that she has never smoked. She has been exposed to tobacco smoke. She has never used smokeless tobacco. She reports that she does not drink alcohol and does not use drugs.        Review of patient's allergies indicates:   Allergen Reactions    Povidone-iodine Other (See Comments)    Povidone-iodine-ethyl alcohol Other (See Comments)     BURN    Penicillins Rash and Hives       Medication List with Changes/Refills   Current Medications    ALENDRONATE (FOSAMAX) 70 MG TABLET    Take 70 mg by mouth every 7 days.    ARFORMOTEROL (BROVANA) 15 MCG/2 ML NEBU    2 mL Inhalation Twice a day    CHLORTHALIDONE (HYGROTEN) 25 MG TAB    TAKE 1 TABLET BY MOUTH IN THE MORNING WITH FOOD    ERGOCALCIFEROL (ERGOCALCIFEROL) 50,000 UNIT CAP    Take 1 capsule (50,000 Units total) by mouth every 30 days. Take one capsule weekly for 12 weeks then reduce to one capsule monthly    ESCITALOPRAM OXALATE (LEXAPRO) 20 MG TABLET    Take 20 mg by mouth.    GABAPENTIN (NEURONTIN) 300 MG CAPSULE         "HYDROXYZINE HCL (ATARAX) 25 MG TABLET    Take 25 mg by mouth every 6 (six) hours as needed.    KETOCONAZOLE (NIZORAL) 2 % CREAM    APPLY TO AFFECTED AREAS TWICE DAILY UNTIL CLEAR    KETOCONAZOLE (NIZORAL) 2 % SHAMPOO    APPLY ON BACK LEAVE ON FOR 5 MINUTES THEN RINSE, 2 TO 3 TIMES WEEKLY    LANSOPRAZOLE (PREVACID) 30 MG CAPSULE    Take 1 capsule (30 mg total) by mouth once daily.    MECLIZINE (ANTIVERT) 25 MG TABLET        PRIMIDONE (MYSOLINE) 50 MG TAB    Take 1 tablet (50 mg total) by mouth every evening.    PROPRANOLOL (INDERAL) 60 MG TABLET        ROPINIROLE (REQUIP) 0.25 MG TABLET        SENNA PLUS 8.6-50 MG PER TABLET    TAKE ONE TO TWO TABLETS BY MOUTH EVERYDAY WHILE TAKING NARCOTIC PAIN MEDS TO PREVENT CONSTIPATION        Objective Findings:  Vital Signs:  /74 (BP Location: Left arm, Patient Position: Sitting)   Pulse 63   Ht 5' 5" (1.651 m)   Wt 83.2 kg (183 lb 6.4 oz)   SpO2 (!) 94%   BMI 30.52 kg/m²  Body mass index is 30.52 kg/m².    Physical Exam:  Physical Exam  Vitals and nursing note reviewed.   Constitutional:       General: She is not in acute distress.     Appearance: Normal appearance.   HENT:      Mouth/Throat:      Mouth: Mucous membranes are moist.   Cardiovascular:      Rate and Rhythm: Normal rate.   Pulmonary:      Effort: Pulmonary effort is normal.   Abdominal:      General: Bowel sounds are normal. There is no distension.      Palpations: Abdomen is soft. There is no mass.      Tenderness: There is no abdominal tenderness.   Skin:     General: Skin is warm and dry.      Coloration: Skin is not jaundiced or pale.   Neurological:      Mental Status: She is alert and oriented to person, place, and time.   Psychiatric:         Mood and Affect: Mood normal.          Labs:  Lab Results   Component Value Date    WBC 9.18 10/08/2024    HGB 11.3 (L) 10/08/2024    HCT 34.8 (L) 10/08/2024    MCV 90.9 10/08/2024    RDW 13.5 10/08/2024     10/08/2024    LYMPH 34.3 10/08/2024    " "LYMPH 3.15 10/08/2024    MONO 12.6 (H) 10/08/2024    EOS 0.52 (H) 10/08/2024    BASO 0.08 10/08/2024     Lab Results   Component Value Date     (L) 10/08/2024    K 3.8 10/08/2024    CL 96 (L) 10/08/2024    CO2 29 10/08/2024    GLU 92 10/08/2024    BUN 17 10/08/2024    CREATININE 0.83 10/08/2024    CALCIUM 9.3 10/08/2024    PROT 7.9 10/09/2024    ALBUMIN 3.9 10/08/2024    BILITOT 0.5 10/08/2024    ALKPHOS 36 (L) 10/08/2024     (H) 10/08/2024     (H) 10/08/2024         Imaging: No results found.      Assessment:  Kailee Malone is a 80 y.o. female here with:  1. Kirkpatrick's esophagus without dysplasia    2. Fatty liver          Recommendations:  1. EGD  2. Prevacid  Avoid spicy, greasy foods  Avoid caffeine, citric acid, chocolate, peppermint, and carbonated drinks  Do not lay down within 3 hours of eating  Exercise 150 minutes per week  Increase fluid to 64 ounces daily  Avoid antiinflammatory medications such as motrin, advil, aleve, ibuprofen, and BC powder  3.Weight loss of 7-10%. Weight loss should be gradual  Diet low in saturated fats and carbohydrates  Good glucose and cholesterol control  4. Liver elastography  5. Follow up with cardiology and PCP as scheduled    Portions of this note may have been created with voice recognition software.  Occasional wrong word or "sound a like substitutions may have occurred due to inherent limitations of voice recognition software.  Please read the note carefully and recognize using contexts, where substitutions have occurred.    Diagnosis, risks, benefits, and side effects of any medications and treatment plan were discussed with the patient.  All questions were answered to the satisfaction of the patient, and patient verbalized understanding and agreement to the treatment plan.          Follow up in about 6 months (around 10/8/2025).      Order summary:  Orders Placed This Encounter    US Elastography Liver w/imaging    EGD       Thank you for " allowing me to participate in the care of Kailee Malone.      Geri Sevilla, ZANA-C

## 2025-04-14 ENCOUNTER — OFFICE VISIT (OUTPATIENT)
Dept: NEUROLOGY | Facility: CLINIC | Age: 80
End: 2025-04-14
Payer: MEDICARE

## 2025-04-14 VITALS
RESPIRATION RATE: 18 BRPM | DIASTOLIC BLOOD PRESSURE: 78 MMHG | HEART RATE: 67 BPM | WEIGHT: 183.38 LBS | BODY MASS INDEX: 30.55 KG/M2 | HEIGHT: 65 IN | SYSTOLIC BLOOD PRESSURE: 135 MMHG | OXYGEN SATURATION: 99 %

## 2025-04-14 DIAGNOSIS — R25.1 TREMORS OF NERVOUS SYSTEM: Primary | ICD-10-CM

## 2025-04-14 DIAGNOSIS — M54.2 NECK PAIN: ICD-10-CM

## 2025-04-14 DIAGNOSIS — M48.02 SPINAL STENOSIS, CERVICAL REGION: ICD-10-CM

## 2025-04-14 PROCEDURE — 1125F AMNT PAIN NOTED PAIN PRSNT: CPT | Mod: CPTII,,, | Performed by: NURSE PRACTITIONER

## 2025-04-14 PROCEDURE — 3078F DIAST BP <80 MM HG: CPT | Mod: CPTII,,, | Performed by: NURSE PRACTITIONER

## 2025-04-14 PROCEDURE — 1160F RVW MEDS BY RX/DR IN RCRD: CPT | Mod: CPTII,,, | Performed by: NURSE PRACTITIONER

## 2025-04-14 PROCEDURE — 1159F MED LIST DOCD IN RCRD: CPT | Mod: CPTII,,, | Performed by: NURSE PRACTITIONER

## 2025-04-14 PROCEDURE — 99215 OFFICE O/P EST HI 40 MIN: CPT | Mod: PBBFAC | Performed by: NURSE PRACTITIONER

## 2025-04-14 PROCEDURE — 99999 PR PBB SHADOW E&M-EST. PATIENT-LVL V: CPT | Mod: PBBFAC,,, | Performed by: NURSE PRACTITIONER

## 2025-04-14 PROCEDURE — 99213 OFFICE O/P EST LOW 20 MIN: CPT | Mod: S$PBB,,, | Performed by: NURSE PRACTITIONER

## 2025-04-14 PROCEDURE — 3075F SYST BP GE 130 - 139MM HG: CPT | Mod: CPTII,,, | Performed by: NURSE PRACTITIONER

## 2025-04-14 PROCEDURE — 3288F FALL RISK ASSESSMENT DOCD: CPT | Mod: CPTII,,, | Performed by: NURSE PRACTITIONER

## 2025-04-14 PROCEDURE — 1101F PT FALLS ASSESS-DOCD LE1/YR: CPT | Mod: CPTII,,, | Performed by: NURSE PRACTITIONER

## 2025-04-14 RX ORDER — METHOCARBAMOL 500 MG/1
500 TABLET, FILM COATED ORAL
COMMUNITY
Start: 2024-12-23 | End: 2025-04-14

## 2025-04-14 RX ORDER — BETAMETHASONE DIPROPIONATE 0.5 MG/G
OINTMENT TOPICAL
COMMUNITY
Start: 2024-10-04

## 2025-04-14 RX ORDER — POTASSIUM CHLORIDE 1500 MG/1
20 TABLET, EXTENDED RELEASE ORAL
COMMUNITY
Start: 2025-02-17

## 2025-04-14 RX ORDER — DICLOFENAC SODIUM 50 MG/1
50 TABLET, DELAYED RELEASE ORAL 2 TIMES DAILY
COMMUNITY
Start: 2024-12-04

## 2025-04-14 RX ORDER — SODIUM CHLORIDE 1 G/1
1000 TABLET ORAL 2 TIMES DAILY
COMMUNITY

## 2025-04-14 RX ORDER — TIZANIDINE 4 MG/1
4 TABLET ORAL EVERY 6 HOURS PRN
Qty: 20 TABLET | Refills: 1 | Status: SHIPPED | OUTPATIENT
Start: 2025-04-14 | End: 2025-04-24

## 2025-04-14 RX ORDER — BUDESONIDE 0.5 MG/2ML
0.25 INHALANT ORAL
COMMUNITY

## 2025-04-14 RX ORDER — PRIMIDONE 50 MG/1
100 TABLET ORAL NIGHTLY
Qty: 60 TABLET | Refills: 5 | Status: SHIPPED | OUTPATIENT
Start: 2025-04-14 | End: 2026-04-14

## 2025-04-14 RX ORDER — TRIAMTERENE/HYDROCHLOROTHIAZID 37.5-25 MG
1 TABLET ORAL EVERY MORNING
COMMUNITY

## 2025-04-14 RX ORDER — PANTOPRAZOLE SODIUM 40 MG/1
40 TABLET, DELAYED RELEASE ORAL
COMMUNITY
Start: 2025-02-17

## 2025-04-14 RX ORDER — LANOLIN ALCOHOL/MO/W.PET/CERES
400 CREAM (GRAM) TOPICAL
COMMUNITY
Start: 2025-03-04 | End: 2025-08-31

## 2025-04-14 NOTE — PROGRESS NOTES
Subjective:       Patient ID: Kailee Malone is a 80 y.o. female     Chief Complaint:    No chief complaint on file.       Allergies:  Povidone-iodine, Povidone-iodine-ethyl alcohol, and Penicillins    Current Medications:    Outpatient Encounter Medications as of 4/14/2025   Medication Sig Dispense Refill    alendronate (FOSAMAX) 70 MG tablet Take 70 mg by mouth every 7 days.      arformoteroL (BROVANA) 15 mcg/2 mL Nebu 2 mL Inhalation Twice a day      betamethasone dipropionate (BETANATE) 0.05 % ointment Apply topically.      budesonide (PULMICORT) 0.5 mg/2 mL nebulizer solution Inhale 0.25 mg into the lungs.      diclofenac (VOLTAREN) 50 MG EC tablet Take 50 mg by mouth 2 (two) times daily.      ergocalciferol (ERGOCALCIFEROL) 50,000 unit Cap Take 1 capsule (50,000 Units total) by mouth every 30 days. Take one capsule weekly for 12 weeks then reduce to one capsule monthly 3 capsule 3    EScitalopram oxalate (LEXAPRO) 20 MG tablet Take 20 mg by mouth.      gabapentin (NEURONTIN) 300 MG capsule       hydrOXYzine HCL (ATARAX) 25 MG tablet Take 25 mg by mouth every 6 (six) hours as needed.      ketoconazole (NIZORAL) 2 % cream APPLY TO AFFECTED AREAS TWICE DAILY UNTIL CLEAR      ketoconazole (NIZORAL) 2 % shampoo APPLY ON BACK LEAVE ON FOR 5 MINUTES THEN RINSE, 2 TO 3 TIMES WEEKLY      magnesium oxide (MAG-OX) 400 mg (241.3 mg magnesium) tablet Take 400 mg by mouth.      meclizine (ANTIVERT) 25 mg tablet       pantoprazole (PROTONIX) 40 MG tablet Take 40 mg by mouth.      potassium chloride (K-TAB) 20 mEq Take 20 mEq by mouth.      propranoloL (INDERAL) 60 MG tablet       rOPINIRole (REQUIP) 0.25 MG tablet       SENNA PLUS 8.6-50 mg per tablet TAKE ONE TO TWO TABLETS BY MOUTH EVERYDAY WHILE TAKING NARCOTIC PAIN MEDS TO PREVENT CONSTIPATION      sodium chloride 1,000 mg TbSO oral tablet Take 1,000 mg by mouth 2 (two) times daily.      triamterene-hydrochlorothiazide 37.5-25 mg (MAXZIDE-25) 37.5-25 mg per  tablet Take 1 tablet by mouth every morning.      [DISCONTINUED] methocarbamoL (ROBAXIN) 500 MG Tab Take 500 mg by mouth.      [DISCONTINUED] primidone (MYSOLINE) 50 MG Tab Take 1 tablet (50 mg total) by mouth every evening. 90 tablet 0    chlorthalidone (HYGROTEN) 25 MG Tab TAKE 1 TABLET BY MOUTH IN THE MORNING WITH FOOD (Patient not taking: No sig reported)      lansoprazole (PREVACID) 30 MG capsule Take 1 capsule (30 mg total) by mouth once daily. 90 capsule 3    primidone (MYSOLINE) 50 MG Tab Take 2 tablets (100 mg total) by mouth every evening. 60 tablet 5    tiZANidine (ZANAFLEX) 4 MG tablet Take 1 tablet (4 mg total) by mouth every 6 (six) hours as needed (neck spasm). 20 tablet 1     No facility-administered encounter medications on file as of 4/14/2025.       History of Present Illness  79 y/o female following in neurology for tremors.    Tremor onset around 2003.  Has had worsening tremor in past due to anxiety and depression symptoms as well.  On primidone 25mg at night and propranolol 60mg daily.  In past higher dosing of propranolol caused bradycardia.  Last visit was reporting generally feeling weak, more dizzy when getting up.  I reduced her dosing of propranolol to 40mg and she reports feeling like this is doing well for her    MRI of the brain with and without contrast done on April 12, 2022 showed no acute abnormality.     She does have concern about feeling her balance is off.  She has had this intermittent since prior knee surgeries last year.  Last PT was in November.  On exam I note no neurological deficits, but on ambulation she seems to favor the right leg.  She is open to using PT again.  I  note no bradykinesia, cogwheel rigidity or other parkinsonian symptoms.     Today reporting symptoms of central neck pain, with reported pain into the LUE tricep region.  She has reportedly gone to Dr. Calle chiropractor in [place], [place] but without improved symptoms.  Given her radiculopathy symptoms and  failed chiropractic visit, will see about getting MRI cervical.  She did report zanaflex did help prn.           Review of Systems  Review of Systems   Constitutional:  Negative for diaphoresis and fever.   HENT:  Negative for congestion, hearing loss and tinnitus.    Eyes:  Negative for blurred vision, double vision, photophobia, discharge and redness.   Respiratory:  Negative for cough and shortness of breath.    Cardiovascular:  Negative for chest pain.   Gastrointestinal:  Negative for abdominal pain, nausea and vomiting.   Musculoskeletal:  Positive for falls and neck pain. Negative for back pain, joint pain and myalgias.   Skin:  Negative for itching and rash.   Neurological:  Positive for tremors. Negative for dizziness, sensory change, speech change, focal weakness, seizures, loss of consciousness, weakness and headaches.   Psychiatric/Behavioral:  Negative for depression, hallucinations and memory loss. The patient does not have insomnia.    All other systems reviewed and are negative.     Objective:     NEUROLOGICAL EXAMINATION:     MENTAL STATUS   Oriented to person, place, and time.   Registration: recalls 3 of 3 objects. Recall at 5 minutes: recalls 3 of 3 objects.   Attention: normal. Concentration: normal.   Speech: speech is normal   Level of consciousness: alert  Knowledge: good and consistent with education.   Normal comprehension.     CRANIAL NERVES     CN II   Visual fields full to confrontation.   Visual acuity: normal  Right visual field deficit: none  Left visual field deficit: none     CN III, IV, VI   Pupils are equal, round, and reactive to light.  Extraocular motions are normal.   Right pupil: Size: 3 mm. Shape: regular. Reactivity: brisk. Consensual response: intact. Accommodation: intact.   Left pupil: Size: 3 mm. Shape: regular. Reactivity: brisk. Consensual response: intact. Accommodation: intact.   CN III: no CN III palsy  CN VI: no CN VI palsy  Nystagmus: none   Diplopia:  none  Upgaze: normal  Downgaze: normal  Conjugate gaze: present  Vestibulo-ocular reflex: present    CN V   Facial sensation intact.   Right facial sensation deficit: none  Left facial sensation deficit: none  Right corneal reflex: normal  Left corneal reflex: normal    CN VII   Facial expression full, symmetric.   Right facial weakness: none  Left facial weakness: none  Right taste: normal  Left taste: normal    CN VIII   CN VIII normal.   Hearing: intact    CN IX, X   CN IX normal.   CN X normal.   Palate: symmetric    CN XI   CN XI normal.   Right sternocleidomastoid strength: normal  Left sternocleidomastoid strength: normal  Right trapezius strength: normal  Left trapezius strength: normal    CN XII   CN XII normal.   Tongue: not atrophic  Fasciculations: absent  Tongue deviation: none    MOTOR EXAM   Muscle bulk: normal  Overall muscle tone: normal  Right arm tone: normal  Left arm tone: normal  Right arm pronator drift: absent  Left arm pronator drift: absent  Right leg tone: normal  Left leg tone: normal    Strength   Right neck flexion: 5/5  Left neck flexion: 5/5  Right neck extension: 5/5  Left neck extension: 5/5  Right deltoid: 5/5  Left deltoid: 5/5  Right biceps: 5/5  Left biceps: 5/5  Right triceps: 5/5  Left triceps: 5/5  Right wrist flexion: 5/5  Left wrist flexion: 5/5  Right wrist extension: 5/5  Left wrist extension: 5/5  Right interossei: 5/5  Left interossei: 5/5  Right iliopsoas: 5/5  Left iliopsoas: 5/5  Right quadriceps: 5/5  Left quadriceps: 5/5  Right hamstrin/5  Left hamstrin/5  Right anterior tibial: 5/5  Left anterior tibial: 5/5  Right posterior tibial: 5/5  Left posterior tibial: 5/5  Right gastroc: 5/5  Left gastroc: 5/5    REFLEXES     Reflexes   Right brachioradialis: 2+  Left brachioradialis: 2+  Right biceps: 2+  Left biceps: 2+  Right triceps: 2+  Left triceps: 2+  Right patellar: 2+  Left patellar: 2+  Right achilles: 2+  Left achilles: 2+  Right plantar: normal  Left  plantar: normal  Right Leung: absent  Left Leung: absent  Right ankle clonus: absent  Left ankle clonus: absent  Right pendular knee jerk: absent  Left pendular knee jerk: absent    SENSORY EXAM   Light touch normal.   Right arm light touch: normal  Left arm light touch: normal  Right leg light touch: normal  Left leg light touch: normal  Vibration normal.   Right arm vibration: normal  Left arm vibration: normal  Right leg vibration: normal  Left leg vibration: normal  Proprioception normal.   Right arm proprioception: normal  Left arm proprioception: normal  Right leg proprioception: normal  Left leg proprioception: normal  Pinprick normal.   Right arm pinprick: normal  Left arm pinprick: normal  Right leg pinprick: normal  Left leg pinprick: normal  Graphesthesia: normal  Romberg: negative  Stereognosis: normal    GAIT AND COORDINATION     Gait  Gait: wide-based     Coordination   Finger to nose coordination: normal  Heel to shin coordination: normal  Tandem walking coordination: abnormal    Tremor   Resting tremor: absent  Intention tremor: present  Action tremor: absent       Physical Exam  Vitals and nursing note reviewed.   Constitutional:       Appearance: Normal appearance.   HENT:      Head: Normocephalic.   Eyes:      Extraocular Movements: Extraocular movements intact and EOM normal.      Pupils: Pupils are equal, round, and reactive to light.   Cardiovascular:      Rate and Rhythm: Normal rate and regular rhythm.   Pulmonary:      Effort: Pulmonary effort is normal.      Breath sounds: Normal breath sounds.   Musculoskeletal:         General: No swelling or tenderness. Normal range of motion.      Cervical back: Normal range of motion and neck supple.      Right lower leg: No edema.      Left lower leg: No edema.   Skin:     General: Skin is warm and dry.      Coloration: Skin is not jaundiced.      Findings: No rash.   Neurological:      General: No focal deficit present.      Mental Status: She is  alert and oriented to person, place, and time.      GCS: GCS eye subscore is 4. GCS verbal subscore is 5. GCS motor subscore is 6.      Cranial Nerves: No cranial nerve deficit.      Sensory: No sensory deficit.      Motor: Motor function is intact. No weakness.      Coordination: Coordination is intact. Coordination normal. Finger-Nose-Finger Test, Heel to Shin Test and Romberg Test normal.      Gait: Gait abnormal and tandem walk abnormal.      Deep Tendon Reflexes: Reflexes normal.      Reflex Scores:       Tricep reflexes are 2+ on the right side and 2+ on the left side.       Bicep reflexes are 2+ on the right side and 2+ on the left side.       Brachioradialis reflexes are 2+ on the right side and 2+ on the left side.       Patellar reflexes are 2+ on the right side and 2+ on the left side.       Achilles reflexes are 2+ on the right side and 2+ on the left side.  Psychiatric:         Mood and Affect: Mood normal.         Speech: Speech normal.         Behavior: Behavior normal.          Assessment:     Problem List Items Addressed This Visit          Neuro    Tremors of nervous system - Primary    Relevant Medications    primidone (MYSOLINE) 50 MG Tab       Orthopedic    Neck pain    Relevant Medications    tiZANidine (ZANAFLEX) 4 MG tablet     Other Visit Diagnoses         Spinal stenosis, cervical region        Relevant Orders    MRI Cervical Spine Without Contrast                 Primary Diagnosis and ICD10  Tremors of nervous system [R25.1]    Plan:     Patient Instructions   Continue the propranolol 40mg daily  Continue the primidone but try to increase dosing to 100mg at night  Zanaflex to use as needed for neck spasm  MRI cervial spine    Medications Discontinued During This Encounter   Medication Reason    methocarbamoL (ROBAXIN) 500 MG Tab     primidone (MYSOLINE) 50 MG Tab        Requested Prescriptions     Signed Prescriptions Disp Refills    tiZANidine (ZANAFLEX) 4 MG tablet 20 tablet 1     Sig:  Take 1 tablet (4 mg total) by mouth every 6 (six) hours as needed (neck spasm).    primidone (MYSOLINE) 50 MG Tab 60 tablet 5     Sig: Take 2 tablets (100 mg total) by mouth every evening.       Orders Placed This Encounter   Procedures    MRI Cervical Spine Without Contrast

## 2025-04-14 NOTE — PATIENT INSTRUCTIONS
Continue the propranolol 40mg daily  Continue the primidone but try to increase dosing to 100mg at night  Zanaflex to use as needed for neck spasm  MRI cervial spine

## 2025-04-21 ENCOUNTER — HOSPITAL ENCOUNTER (OUTPATIENT)
Dept: RADIOLOGY | Facility: HOSPITAL | Age: 80
Discharge: HOME | End: 2025-04-21
Attending: NURSE PRACTITIONER
Payer: MEDICARE

## 2025-04-21 DIAGNOSIS — K76.0 FATTY LIVER: ICD-10-CM

## 2025-04-21 PROCEDURE — 76981 USE PARENCHYMA: CPT | Mod: 26,,, | Performed by: RADIOLOGY

## 2025-04-21 PROCEDURE — 76981 USE PARENCHYMA: CPT | Mod: TC

## 2025-04-28 ENCOUNTER — TELEPHONE (OUTPATIENT)
Dept: GASTROENTEROLOGY | Facility: CLINIC | Age: 80
End: 2025-04-28
Payer: MEDICARE

## 2025-04-28 ENCOUNTER — RESULTS FOLLOW-UP (OUTPATIENT)
Dept: GASTROENTEROLOGY | Facility: CLINIC | Age: 80
End: 2025-04-28

## 2025-04-28 NOTE — TELEPHONE ENCOUNTER
Attempted to call results. Left message.            ----- Message from ZANA Jaffe sent at 4/28/2025  7:08 AM CDT -----  No fibrosis of the liver on elastography. Exercise 150 minutes per week  Weight loss of 7-10%. Weight loss should be gradual  Diet low in saturated fats and carbohydrates  Good glucose and cholesterol control    ----- Message -----  From: [facility], Rad Results In  Sent: 4/26/2025  11:57 AM CDT  To: ZANA Redmond

## 2025-04-29 ENCOUNTER — TELEPHONE (OUTPATIENT)
Dept: GASTROENTEROLOGY | Facility: CLINIC | Age: 80
End: 2025-04-29
Payer: MEDICARE

## 2025-04-29 NOTE — TELEPHONE ENCOUNTER
Results and recommendations called to patient. Verbalized understanding.            ----- Message from ZANA Jaffe sent at 4/28/2025  7:08 AM CDT -----  No fibrosis of the liver on elastography. Exercise 150 minutes per week  Weight loss of 7-10%. Weight loss should be gradual  Diet low in saturated fats and carbohydrates  Good glucose and cholesterol control    ----- Message -----  From: Interface, Rad Results In  Sent: 4/26/2025  11:57 AM CDT  To: ZANA Redmond

## 2025-05-01 ENCOUNTER — TELEPHONE (OUTPATIENT)
Dept: NEUROLOGY | Facility: CLINIC | Age: 80
End: 2025-05-01
Payer: MEDICARE

## 2025-05-01 NOTE — TELEPHONE ENCOUNTER
Returned call to pt told her MRI report not ready yet will call her when we get results. She v/u.        ----- Message from Claudia sent at 5/1/2025  3:00 PM CDT -----  Regarding: RESULTS  Who Called: Kailee Severino Cortes is requesting information on test results.Name of Test (Lab/Mammo/Etc): MRIDate of Test: 04-30Where the test was performed: [facility]Ordering Provider: NOY Beckford Method of Contact: Phone CallPatient's Preferred Phone Number on File: [phone] Best Call Back Number, if different: [phone] Information:

## 2025-05-07 ENCOUNTER — TELEPHONE (OUTPATIENT)
Dept: NEUROLOGY | Facility: CLINIC | Age: 80
End: 2025-05-07
Payer: MEDICARE

## 2025-05-07 ENCOUNTER — RESULTS FOLLOW-UP (OUTPATIENT)
Dept: NEUROLOGY | Facility: CLINIC | Age: 80
End: 2025-05-07
Payer: MEDICARE

## 2025-05-07 NOTE — TELEPHONE ENCOUNTER
Returned call to pt with MRI C spine results. I told her KATY Villa NP suggest PT if she wants to do it. She states no PT at this time.          ----- Message from Trudy sent at 5/7/2025 10:45 AM CDT -----  Regarding: MRI Results  Who Called: Kailee Severino Cortes is requesting information on test results.Name of Test (Lab/Mammo/Etc): MRIDate of Test: 4/30[facility] the test was performed[facility]ing Provider: Raf VillaPatient's Preferred Phone Number on File: [phone]

## 2025-05-09 ENCOUNTER — TELEPHONE (OUTPATIENT)
Dept: NEUROLOGY | Facility: CLINIC | Age: 80
End: 2025-05-09
Payer: MEDICARE

## 2025-05-09 NOTE — TELEPHONE ENCOUNTER
I spoke with pt who said that Anita had already spoke with her about her results.          ---- Message from ZANA Love sent at 5/7/2025 10:02 AM CDT -----  No significant stenosis noted on the cervical spine MRI.  No central cord compression.  Had mild foraminal stenosis, so this could be source of discomfort.  I would recommend PT for this.  If she is   interested just let me know where she would like to have that done at.  ----- Message -----  From: Interface, Rad Results In  Sent: 5/7/2025   8:04 AM CDT  To: ZANA Florian

## 2025-05-16 ENCOUNTER — ANESTHESIA (OUTPATIENT)
Dept: GASTROENTEROLOGY | Facility: HOSPITAL | Age: 80
End: 2025-05-16
Payer: MEDICARE

## 2025-05-16 ENCOUNTER — HOSPITAL ENCOUNTER (OUTPATIENT)
Dept: GASTROENTEROLOGY | Facility: HOSPITAL | Age: 80
Discharge: HOME | End: 2025-05-16
Attending: NURSE PRACTITIONER | Admitting: INTERNAL MEDICINE
Payer: MEDICARE

## 2025-05-16 ENCOUNTER — ANESTHESIA EVENT (OUTPATIENT)
Dept: GASTROENTEROLOGY | Facility: HOSPITAL | Age: 80
End: 2025-05-16
Payer: MEDICARE

## 2025-05-16 VITALS
HEART RATE: 67 BPM | SYSTOLIC BLOOD PRESSURE: 128 MMHG | OXYGEN SATURATION: 98 % | HEIGHT: 65 IN | TEMPERATURE: 97 F | RESPIRATION RATE: 15 BRPM | DIASTOLIC BLOOD PRESSURE: 72 MMHG | WEIGHT: 180 LBS | BODY MASS INDEX: 29.99 KG/M2

## 2025-05-16 DIAGNOSIS — K29.00 ACUTE SUPERFICIAL GASTRITIS WITHOUT HEMORRHAGE: Primary | ICD-10-CM

## 2025-05-16 DIAGNOSIS — K44.9 HH (HIATUS HERNIA): ICD-10-CM

## 2025-05-16 DIAGNOSIS — K22.70 BARRETT'S ESOPHAGUS WITHOUT DYSPLASIA: ICD-10-CM

## 2025-05-16 PROCEDURE — 37000008 HC ANESTHESIA 1ST 15 MINUTES

## 2025-05-16 PROCEDURE — 88305 TISSUE EXAM BY PATHOLOGIST: CPT | Mod: 26,,, | Performed by: PATHOLOGY

## 2025-05-16 PROCEDURE — 88342 IMHCHEM/IMCYTCHM 1ST ANTB: CPT | Mod: TC,59,SUR | Performed by: INTERNAL MEDICINE

## 2025-05-16 PROCEDURE — 27201423 OPTIME MED/SURG SUP & DEVICES STERILE SUPPLY

## 2025-05-16 PROCEDURE — 63600175 PHARM REV CODE 636 W HCPCS: Performed by: NURSE ANESTHETIST, CERTIFIED REGISTERED

## 2025-05-16 PROCEDURE — 43239 EGD BIOPSY SINGLE/MULTIPLE: CPT | Performed by: INTERNAL MEDICINE

## 2025-05-16 PROCEDURE — 43239 EGD BIOPSY SINGLE/MULTIPLE: CPT | Mod: ,,, | Performed by: INTERNAL MEDICINE

## 2025-05-16 PROCEDURE — 88342 IMHCHEM/IMCYTCHM 1ST ANTB: CPT | Mod: 26,,, | Performed by: PATHOLOGY

## 2025-05-16 RX ORDER — PROPOFOL 10 MG/ML
INJECTION, EMULSION INTRAVENOUS
Status: DISCONTINUED | OUTPATIENT
Start: 2025-05-16 | End: 2025-05-16

## 2025-05-16 RX ORDER — LIDOCAINE HYDROCHLORIDE 20 MG/ML
INJECTION, SOLUTION EPIDURAL; INFILTRATION; INTRACAUDAL; PERINEURAL
Status: DISCONTINUED | OUTPATIENT
Start: 2025-05-16 | End: 2025-05-16

## 2025-05-16 RX ORDER — SODIUM CHLORIDE, SODIUM LACTATE, POTASSIUM CHLORIDE, CALCIUM CHLORIDE 600; 310; 30; 20 MG/100ML; MG/100ML; MG/100ML; MG/100ML
INJECTION, SOLUTION INTRAVENOUS CONTINUOUS
Status: DISCONTINUED | OUTPATIENT
Start: 2025-05-16 | End: 2025-05-17 | Stop reason: HOSPADM

## 2025-05-16 RX ORDER — SODIUM CHLORIDE 0.9 % (FLUSH) 0.9 %
10 SYRINGE (ML) INJECTION EVERY 6 HOURS PRN
Status: DISCONTINUED | OUTPATIENT
Start: 2025-05-16 | End: 2025-05-17 | Stop reason: HOSPADM

## 2025-05-16 RX ADMIN — PROPOFOL 100 MG: 10 INJECTION, EMULSION INTRAVENOUS at 01:05

## 2025-05-16 RX ADMIN — LIDOCAINE HYDROCHLORIDE 50 MG: 20 INJECTION, SOLUTION EPIDURAL; INFILTRATION; INTRACAUDAL; PERINEURAL at 01:05

## 2025-05-16 RX ADMIN — LIDOCAINE HYDROCHLORIDE 100 MG: 20 INJECTION, SOLUTION EPIDURAL; INFILTRATION; INTRACAUDAL; PERINEURAL at 01:05

## 2025-05-16 NOTE — TRANSFER OF CARE
"Anesthesia Transfer of Care Note    Patient: Kailee Malone    Procedure(s) Performed: * No procedures listed *    Patient location: GI    Anesthesia Type: MAC    Transport from OR: Transported from OR on room air with adequate spontaneous ventilation. Continuous ECG monitoring in transport. Continuous SpO2 monitoring in transport    Post pain: adequate analgesia    Post assessment: no apparent anesthetic complications    Post vital signs: stable    Level of consciousness: lethargic    Nausea/Vomiting: no nausea/vomiting    Complications: none    Transfer of care protocol was followed      Last vitals: Visit Vitals  /69   Pulse 65   Temp 36.1 °C (97 °F) (Skin)   Resp 16   Ht 5' 5" (1.651 m)   Wt 81.6 kg (180 lb)   SpO2 96%   BMI 29.95 kg/m²     "

## 2025-05-16 NOTE — H&P
[facility] - Endoscopy  Gastroenterology  H&P    Patient Name: Kailee Malone  MRN: [id number]  Admission Date: 5/16/2025  Code Status: Prior    Attending Provider: Geri Sevilla FNP   Primary Care Physician: Duane Mayorga MD  Principal Problem:<principal problem not specified>    Subjective:     History of Present Illness:  This patient is a 80-year-old female with history of Kirkpatrick esophagus.  She presents for EGD.  She does have anemia.   Past Medical History:   Diagnosis Date    Acute gastric ulcer without hemorrhage or perforation 02/03/2022    Acute superficial gastritis without hemorrhage 02/03/2022    Kirkpatrick's esophagus without dysplasia 02/04/2022    Diverticula, colon 10/04/2021    HH (hiatus hernia) 02/03/2022    History of colon polyps 10/04/2021    Hypertension     Screening for colon cancer 10/04/2021       Past Surgical History:   Procedure Laterality Date    ARTHROSCOPY OF KNEE Right 05/24/2021    Procedure: ARTHROSCOPY, KNEE;  Surgeon: Kenny Valenzuela III, MD;  Location: [facility];  Service: Orthopedics;  Laterality: Right;    BREAST BIOPSY      BREAST SURGERY      lumpectomy    HYSTERECTOMY      KNEE ARTHROSCOPY W/ MENISCECTOMY Right 05/24/2021    Procedure: ARTHROSCOPY, KNEE, WITH MENISCECTOMY;  Surgeon: Kenny Valenzuela III, MD;  Location: [facility];  Service: Orthopedics;  Laterality: Right;  medial menisectomy    OOPHORECTOMY      RT WRIST         Review of patient's allergies indicates:   Allergen Reactions    Povidone-iodine Other (See Comments)    Povidone-iodine-ethyl alcohol Other (See Comments)     BURN    Penicillins Rash and Hives     Family History       Problem Relation (Age of Onset)    Breast cancer Mother, Maternal Aunt          Tobacco Use    Smoking status: Never     Passive exposure: Past    Smokeless tobacco: Never   Substance and Sexual Activity    Alcohol use: Never    Drug use: Never    Sexual activity: Not Currently     Review of Systems  "  Constitutional: Negative.    HENT: Negative.     Respiratory: Negative.     Cardiovascular: Negative.    Gastrointestinal: Negative.      Objective:     Vital Signs (Most Recent):    Vital Signs (24h Range):           There is no height or weight on file to calculate BMI.    No intake or output data in the 24 hours ending 05/16/25 1301    Lines/Drains/Airways       None                   Physical Exam  Vitals reviewed.   Constitutional:       General: She is not in acute distress.     Appearance: Normal appearance. She is well-developed. She is obese. She is not ill-appearing.   HENT:      Head: Normocephalic and atraumatic.      Nose: Nose normal.   Eyes:      Pupils: Pupils are equal, round, and reactive to light.   Cardiovascular:      Rate and Rhythm: Normal rate and regular rhythm.   Pulmonary:      Effort: Pulmonary effort is normal.      Breath sounds: Normal breath sounds. No wheezing.   Abdominal:      General: Abdomen is flat. Bowel sounds are normal. There is no distension.      Palpations: Abdomen is soft.      Tenderness: There is no abdominal tenderness. There is no guarding.   Skin:     General: Skin is warm and dry.      Coloration: Skin is not jaundiced.   Neurological:      Mental Status: She is alert.   Psychiatric:         Attention and Perception: Attention normal.         Mood and Affect: Affect normal.         Speech: Speech normal.         Behavior: Behavior is cooperative.      Comments: Pt was calm while speaking.         Significant Labs:  CBC: No results for input(s): "WBC", "HGB", "HCT", "PLT" in the last 48 hours.  CMP: No results for input(s): "GLU", "CALCIUM", "ALBUMIN", "PROT", "NA", "K", "CO2", "CL", "BUN", "CREATININE", "ALKPHOS", "ALT", "AST", "BILITOT" in the last 48 hours.    Significant Imaging:  Imaging results within the past 24 hours have been reviewed.    Assessment/Plan:     There are no hospital problems to display for this patient.        Impression:  Casimiro " esophagus, anemia  Plan: EGD    Javier Carmona MD  Gastroenterology  [facility] - Endoscopy

## 2025-05-16 NOTE — DISCHARGE INSTRUCTIONS
Procedure Date  5/16/25     Impression  Overall Impression:   Performed forceps biopsies in the stomach  Performed forceps biopsies in the esophagus  2 cm hiatal hernia  Erythematous mucosa in the fundus of the stomach and antrum; performed cold forceps biopsy  Mucosa of the esophagus was normal-appearing.  A 2 cm hiatal hernia was noted and biopsies were obtained at the EG junction.  The stomach had mild erythema and biopsies were obtained from the antrum and fundus.  The pyloric channel was patent.  The mucosa of the duodenal bulb through 3rd portion was normal-appearing.     Recommendation  Await pathology results, due: 5/20/2025      Disposition: Discharge patient to home in stable condition.  Resume diet.  No driving until tomorrow.  Follow up pathology results next week.  Follow up with PCP as scheduled, continue Protonix, follow up in GI clinic as needed.

## 2025-05-16 NOTE — ANESTHESIA PREPROCEDURE EVALUATION
05/16/2025  Kailee Malone is a 80 y.o., female.    Anesthesia Evaluation    I have reviewed the Patient Summary Reports.     I have reviewed the Nursing Notes. I have reviewed the NPO Status.   I have reviewed the Medications.     Review of Systems  Anesthesia Hx:  No problems with previous Anesthesia                Social:  Non-Smoker, No Alcohol Use       Hematology/Oncology:  Hematology Normal   Oncology Normal                                   EENT/Dental:  EENT/Dental Normal           Cardiovascular:     Hypertension           hyperlipidemia                               Pulmonary:    Asthma mild                   Renal/:  Renal/ Normal                 Hepatic/GI:   PUD, Hiatal Hernia, GERD Liver Disease,               Musculoskeletal:  Arthritis               Neurological:  Neurology Normal                                      Endocrine:  Endocrine Normal            Dermatological:  Skin Normal    Psych:  Psychiatric History  depression                Physical Exam  General:  Well nourished and Obesity       Airway/Jaw/Neck:  Airway Findings: Mouth Opening: Normal     Tongue: Normal      General Airway Assessment: Adult      Mallampati: II   TM Distance: Normal, at least 6 cm   Jaw/Neck Findings:            Eyes/Ears/Nose:  Eyes/Ears/Nose Findings:     Dental:  Dental Findings: In tact      Chest/Lungs:  Chest/Lungs Findings:  Clear to auscultation, Normal Respiratory Rate       Heart/Vascular:  Heart Findings: Rate: Normal  Rhythm: Regular Rhythm  Sounds: Normal                    Abdomen:  Abdomen Findings:  Normal, Soft, Nontender       Musculoskeletal:  Musculoskeletal Findings:     Mental Status:  Mental Status Findings:  Cooperative, Alert and Oriented         Anesthesia Plan  Type of Anesthesia, risks & benefits discussed:  Anesthesia Type:  MAC    Patient's Preference:   Plan  Factors:          Intra-op Monitoring Plan: standard ASA monitors  Intra-op Monitoring Plan Comments:   Post Op Pain Control Plan: multimodal analgesia  Post Op Pain Control Plan Comments:     Induction:   IV  Beta Blocker:  Patient is on a Beta-Blocker and has received one dose within the past 24 hours (No further documentation required).       Informed Consent: Informed consent signed with the Patient and all parties understand the risks and agree with anesthesia plan.  All questions answered.  Anesthesia consent signed with patient.  ASA Score: 3     Day of Surgery Review of History & Physical: I have interviewed and examined the patient. I have reviewed the patient's H&P dated:  There are no significant changes.            Ready For Surgery From Anesthesia Perspective.             Physical Exam  General: Well nourished and Obesity    Airway:  Mallampati: II   Mouth Opening: Normal  TM Distance: Normal, at least 6 cm  Tongue: Normal    Dental:  In tact    Chest/Lungs:  Clear to auscultation, Normal Respiratory Rate    Heart:  Rate: Normal  Rhythm: Regular Rhythm  Sounds: Normal    Abdomen:  Normal, Soft, Nontender        Anesthesia Plan  Type of Anesthesia, risks & benefits discussed:    Anesthesia Type: MAC  Intra-op Monitoring Plan: standard ASA monitors  Post Op Pain Control Plan: multimodal analgesia  Induction:  IV  Informed Consent: Informed consent signed with the Patient and all parties understand the risks and agree with anesthesia plan.  All questions answered.   ASA Score: 3  Day of Surgery Review of History & Physical: I have interviewed and examined the patient. I have reviewed the patient's H&P dated:     Ready For Surgery From Anesthesia Perspective.     .

## 2025-05-16 NOTE — ANESTHESIA POSTPROCEDURE EVALUATION
Anesthesia Post Evaluation    Patient: Kailee Malone    Procedure(s) Performed: * No procedures listed *    Final Anesthesia Type: MAC      Patient location during evaluation: GI PACU  Patient participation: Yes- Able to Participate  Level of consciousness: awake and alert  Post-procedure vital signs: reviewed and stable  Pain management: adequate  Airway patency: patent    PONV status at discharge: No PONV  Anesthetic complications: no      Cardiovascular status: blood pressure returned to baseline  Respiratory status: unassisted  Hydration status: euvolemic  Follow-up not needed.              Vitals Value Taken Time   /75 05/16/25 13:42   Temp 36.1 °C (97 °F) 05/16/25 13:25   Pulse 65 05/16/25 13:43   Resp 21 05/16/25 13:43   SpO2 97 % 05/16/25 13:43   Vitals shown include unfiled device data.      No case tracking events are documented in the log.      Pain/Bri Score: Bri Score: 10 (5/16/2025  1:32 PM)

## 2025-05-19 ENCOUNTER — RESULTS FOLLOW-UP (OUTPATIENT)
Dept: GASTROENTEROLOGY | Facility: CLINIC | Age: 80
End: 2025-05-19

## 2025-05-19 LAB
ESTROGEN SERPL-MCNC: NORMAL PG/ML
INSULIN SERPL-ACNC: NORMAL U[IU]/ML
LAB AP GROSS DESCRIPTION: NORMAL
LAB AP LABORATORY NOTES: NORMAL
T3RU NFR SERPL: NORMAL %

## 2025-05-19 NOTE — PROGRESS NOTES
EGD biopsy shows mild chronic.  Recommendation:  Continue Protonix 40 mg per as needed and return to GI clinic as needed.

## 2025-05-20 ENCOUNTER — TELEPHONE (OUTPATIENT)
Dept: GASTROENTEROLOGY | Facility: CLINIC | Age: 80
End: 2025-05-20
Payer: MEDICARE

## 2025-05-20 NOTE — TELEPHONE ENCOUNTER
Results and recommendations called to patient. Verbalized understanding.              ----- Message from Javier Carmona MD sent at 5/19/2025  3:44 PM CDT -----  EGD biopsy shows mild chronic.  Recommendation:  Continue Protonix 40 mg per as needed and return to GI clinic as needed.  ----- Message -----  From: Lab, Background User  Sent: 5/19/2025  11:57 AM CDT  To: Javier Carmona MD

## 2025-07-24 NOTE — PROGRESS NOTES
Subjective:       Patient ID: Kailee Malone is a 80 y.o. female     Chief Complaint:    Chief Complaint   Patient presents with    tremors of nervous system     Pt. States change medication 1 at night. Tremors no better.        Allergies:  Povidone-iodine, Povidone-iodine-ethyl alcohol, and Penicillins    Current Medications:    Outpatient Encounter Medications as of 7/25/2025   Medication Sig Dispense Refill    albuterol (PROVENTIL/VENTOLIN HFA) 90 mcg/actuation inhaler Inhale 2 puffs into the lungs.      alendronate (FOSAMAX) 70 MG tablet Take 70 mg by mouth every 7 days.      arformoteroL (BROVANA) 15 mcg/2 mL Nebu 2 mL Inhalation Twice a day      betamethasone dipropionate (BETANATE) 0.05 % ointment Apply topically.      budesonide (PULMICORT) 0.5 mg/2 mL nebulizer solution Inhale 0.25 mg into the lungs.      diclofenac (VOLTAREN) 50 MG EC tablet Take 50 mg by mouth 2 (two) times daily.      ergocalciferol (ERGOCALCIFEROL) 50,000 unit Cap Take 1 capsule (50,000 Units total) by mouth every 30 days. Take one capsule weekly for 12 weeks then reduce to one capsule monthly 3 capsule 3    EScitalopram oxalate (LEXAPRO) 20 MG tablet Take 20 mg by mouth.      gabapentin (NEURONTIN) 300 MG capsule       hydrOXYzine HCL (ATARAX) 25 MG tablet Take 25 mg by mouth every 6 (six) hours as needed.      ketoconazole (NIZORAL) 2 % cream APPLY TO AFFECTED AREAS TWICE DAILY UNTIL CLEAR      ketoconazole (NIZORAL) 2 % shampoo APPLY ON BACK LEAVE ON FOR 5 MINUTES THEN RINSE, 2 TO 3 TIMES WEEKLY      magnesium oxide (MAG-OX) 400 mg (241.3 mg magnesium) tablet Take 400 mg by mouth.      meclizine (ANTIVERT) 25 mg tablet       neomycin-polymyxin-dexamethasone (MAXITROL) 3.5mg/mL-10,000 unit/mL-0.1 % DrpS INSTILL 1 DROP INTO EACH EYE 4 TIMES DAILY      pantoprazole (PROTONIX) 40 MG tablet Take 40 mg by mouth.      potassium chloride (K-TAB) 20 mEq Take 20 mEq by mouth.      rOPINIRole (REQUIP) 0.25 MG tablet       SENNA PLUS  8.6-50 mg per tablet TAKE ONE TO TWO TABLETS BY MOUTH EVERYDAY WHILE TAKING NARCOTIC PAIN MEDS TO PREVENT CONSTIPATION      sodium chloride 1,000 mg TbSO oral tablet Take 1,000 mg by mouth 2 (two) times daily.      triamterene-hydrochlorothiazide 37.5-25 mg (MAXZIDE-25) 37.5-25 mg per tablet Take 1 tablet by mouth every morning.      [DISCONTINUED] primidone (MYSOLINE) 50 MG Tab Take 2 tablets (100 mg total) by mouth every evening. 60 tablet 5    [DISCONTINUED] propranoloL (INDERAL) 60 MG tablet       chlorthalidone (HYGROTEN) 25 MG Tab TAKE 1 TABLET BY MOUTH IN THE MORNING WITH FOOD (Patient not taking: Reported on 7/25/2025)      lansoprazole (PREVACID) 30 MG capsule Take 1 capsule (30 mg total) by mouth once daily. 90 capsule 3    methocarbamoL (ROBAXIN) 500 MG Tab Take 1 tablet (500 mg total) by mouth 2 (two) times daily as needed (neck pain). 40 tablet 0    primidone (MYSOLINE) 50 MG Tab Take 1 tablet (50 mg total) by mouth every evening. 30 tablet 11    propranoloL (INDERAL) 40 MG tablet Take 1 tablet (40 mg total) by mouth once daily. 30 tablet 11     No facility-administered encounter medications on file as of 7/25/2025.       History of Present Illness  81 y/o female following in neurology for tremors.    Tremor onset around 2003.  Has had worsening tremor in past due to anxiety and depression symptoms as well.  On primidone 50mg at night and propranolol 40mg daily.  In past higher dosing of propranolol caused bradycardia and higher dosing mysoline caused her drowsiness.    MRI of the brain with and without contrast done on April 12, 2022 showed no acute abnormality.     No parkinsonian symptoms appreciated on exam today    She does have concern about feeling her balance is off.  She has had this intermittent since prior knee surgeries last year.  Last PT was in November 2024.  On exam I note no neurological deficits, but on ambulation she seems to favor the right leg.   I  note no bradykinesia, cogwheel  rigidity or other parkinsonian symptoms.     Last visit reporting symptoms of neck pain, and pain radiating into the LUE tricep region.  Prior chiropractic evaluation without improved symptoms.  We obtained MRI cervical.  5/7/25, no central stenosis reported, though mild foraminal stenosis at multiple levels which certainly could explain her discomfort.  Has history of prior CTS release on the right, she has reports of sometimes with weakness in the hand  strength.  I do prescribe her robaxin to use prn for neck spasm           Review of Systems  Review of Systems   Constitutional:  Negative for diaphoresis and fever.   HENT:  Negative for congestion, hearing loss and tinnitus.    Eyes:  Negative for blurred vision, double vision, photophobia, discharge and redness.   Respiratory:  Negative for cough and shortness of breath.    Cardiovascular:  Negative for chest pain.   Gastrointestinal:  Negative for abdominal pain, nausea and vomiting.   Musculoskeletal:  Positive for falls and neck pain. Negative for back pain, joint pain and myalgias.   Skin:  Negative for itching and rash.   Neurological:  Positive for tremors. Negative for dizziness, sensory change, speech change, focal weakness, seizures, loss of consciousness, weakness and headaches.   Psychiatric/Behavioral:  Negative for depression, hallucinations and memory loss. The patient does not have insomnia.    All other systems reviewed and are negative.     Objective:     NEUROLOGICAL EXAMINATION:     MENTAL STATUS   Oriented to person, place, and time.   Registration: recalls 3 of 3 objects. Recall at 5 minutes: recalls 3 of 3 objects.   Attention: normal. Concentration: normal.   Speech: speech is normal   Level of consciousness: alert  Knowledge: good and consistent with education.   Normal comprehension.     CRANIAL NERVES     CN II   Visual fields full to confrontation.   Visual acuity: normal  Right visual field deficit: none  Left visual field  deficit: none     CN III, IV, VI   Pupils are equal, round, and reactive to light.  Extraocular motions are normal.   Right pupil: Size: 3 mm. Shape: regular. Reactivity: brisk. Consensual response: intact. Accommodation: intact.   Left pupil: Size: 3 mm. Shape: regular. Reactivity: brisk. Consensual response: intact. Accommodation: intact.   CN III: no CN III palsy  CN VI: no CN VI palsy  Nystagmus: none   Diplopia: none  Upgaze: normal  Downgaze: normal  Conjugate gaze: present  Vestibulo-ocular reflex: present    CN V   Facial sensation intact.   Right facial sensation deficit: none  Left facial sensation deficit: none  Right corneal reflex: normal  Left corneal reflex: normal    CN VII   Facial expression full, symmetric.   Right facial weakness: none  Left facial weakness: none  Right taste: normal  Left taste: normal    CN VIII   CN VIII normal.   Hearing: intact    CN IX, X   CN IX normal.   CN X normal.   Palate: symmetric    CN XI   CN XI normal.   Right sternocleidomastoid strength: normal  Left sternocleidomastoid strength: normal  Right trapezius strength: normal  Left trapezius strength: normal    CN XII   CN XII normal.   Tongue: not atrophic  Fasciculations: absent  Tongue deviation: none    MOTOR EXAM   Muscle bulk: normal  Overall muscle tone: normal  Right arm tone: normal  Left arm tone: normal  Right arm pronator drift: absent  Left arm pronator drift: absent  Right leg tone: normal  Left leg tone: normal    Strength   Right neck flexion: 5/5  Left neck flexion: 5/5  Right neck extension: 5/5  Left neck extension: 5/5  Right deltoid: 5/5  Left deltoid: 5/5  Right biceps: 5/5  Left biceps: 5/5  Right triceps: 5/5  Left triceps: 5/5  Right wrist flexion: 5/5  Left wrist flexion: 5/5  Right wrist extension: 5/5  Left wrist extension: 5/5  Right interossei: 5/5  Left interossei: 5/5  Right iliopsoas: 5/5  Left iliopsoas: 5/5  Right quadriceps: 5/5  Left quadriceps: 5/5  Right hamstrin/5  Left  hamstrin/5  Right anterior tibial: 5/5  Left anterior tibial: 5/5  Right posterior tibial: 5/5  Left posterior tibial: 5/5  Right gastroc: 5/5  Left gastroc: 5/5    REFLEXES     Reflexes   Right brachioradialis: 2+  Left brachioradialis: 2+  Right biceps: 2+  Left biceps: 2+  Right triceps: 2+  Left triceps: 2+  Right patellar: 2+  Left patellar: 2+  Right achilles: 2+  Left achilles: 2+  Right plantar: normal  Left plantar: normal  Right Leung: absent  Left Leung: absent  Right ankle clonus: absent  Left ankle clonus: absent  Right pendular knee jerk: absent  Left pendular knee jerk: absent    SENSORY EXAM   Light touch normal.   Right arm light touch: normal  Left arm light touch: normal  Right leg light touch: normal  Left leg light touch: normal  Vibration normal.   Right arm vibration: normal  Left arm vibration: normal  Right leg vibration: normal  Left leg vibration: normal  Proprioception normal.   Right arm proprioception: normal  Left arm proprioception: normal  Right leg proprioception: normal  Left leg proprioception: normal  Pinprick normal.   Right arm pinprick: normal  Left arm pinprick: normal  Right leg pinprick: normal  Left leg pinprick: normal  Graphesthesia: normal  Romberg: negative  Stereognosis: normal    GAIT AND COORDINATION     Gait  Gait: wide-based     Coordination   Finger to nose coordination: normal  Heel to shin coordination: normal  Tandem walking coordination: abnormal    Tremor   Resting tremor: absent  Intention tremor: present  Action tremor: absent       Physical Exam  Vitals and nursing note reviewed.   Constitutional:       Appearance: Normal appearance.   HENT:      Head: Normocephalic.   Eyes:      Extraocular Movements: Extraocular movements intact and EOM normal.      Pupils: Pupils are equal, round, and reactive to light.   Cardiovascular:      Rate and Rhythm: Normal rate and regular rhythm.   Pulmonary:      Effort: Pulmonary effort is normal.      Breath  sounds: Normal breath sounds.   Musculoskeletal:         General: No swelling or tenderness. Normal range of motion.      Cervical back: Normal range of motion and neck supple.      Right lower leg: No edema.      Left lower leg: No edema.   Skin:     General: Skin is warm and dry.      Coloration: Skin is not jaundiced.      Findings: No rash.   Neurological:      General: No focal deficit present.      Mental Status: She is alert and oriented to person, place, and time.      GCS: GCS eye subscore is 4. GCS verbal subscore is 5. GCS motor subscore is 6.      Cranial Nerves: No cranial nerve deficit.      Sensory: No sensory deficit.      Motor: Motor function is intact. No weakness.      Coordination: Coordination is intact. Coordination normal. Finger-Nose-Finger Test, Heel to Shin Test and Romberg Test normal.      Gait: Gait abnormal and tandem walk abnormal.      Deep Tendon Reflexes: Reflexes normal.      Reflex Scores:       Tricep reflexes are 2+ on the right side and 2+ on the left side.       Bicep reflexes are 2+ on the right side and 2+ on the left side.       Brachioradialis reflexes are 2+ on the right side and 2+ on the left side.       Patellar reflexes are 2+ on the right side and 2+ on the left side.       Achilles reflexes are 2+ on the right side and 2+ on the left side.  Psychiatric:         Mood and Affect: Mood normal.         Speech: Speech normal.         Behavior: Behavior normal.          Assessment:     Problem List Items Addressed This Visit          Neuro    Tremors of nervous system - Primary    Relevant Medications    propranoloL (INDERAL) 40 MG tablet    primidone (MYSOLINE) 50 MG Tab              Primary Diagnosis and ICD10  Tremors of nervous system [R25.1]    Plan:     Patient Instructions   Continue the propranolol 40mg daily  Continue the mysoline 50mg at night  Reviewed prior MRI cervical  Recommend wearing wrist splint at night when sleeping    Medications Discontinued During  This Encounter   Medication Reason    propranoloL (INDERAL) 60 MG tablet     primidone (MYSOLINE) 50 MG Tab          Requested Prescriptions     Signed Prescriptions Disp Refills    propranoloL (INDERAL) 40 MG tablet 30 tablet 11     Sig: Take 1 tablet (40 mg total) by mouth once daily.    primidone (MYSOLINE) 50 MG Tab 30 tablet 11     Sig: Take 1 tablet (50 mg total) by mouth every evening.    methocarbamoL (ROBAXIN) 500 MG Tab 40 tablet 0     Sig: Take 1 tablet (500 mg total) by mouth 2 (two) times daily as needed (neck pain).       No orders of the defined types were placed in this encounter.

## 2025-07-25 ENCOUNTER — OFFICE VISIT (OUTPATIENT)
Dept: NEUROLOGY | Facility: CLINIC | Age: 80
End: 2025-07-25
Payer: MEDICARE

## 2025-07-25 VITALS
BODY MASS INDEX: 30.99 KG/M2 | WEIGHT: 186 LBS | HEIGHT: 65 IN | SYSTOLIC BLOOD PRESSURE: 136 MMHG | DIASTOLIC BLOOD PRESSURE: 84 MMHG | HEART RATE: 73 BPM | OXYGEN SATURATION: 98 % | RESPIRATION RATE: 18 BRPM

## 2025-07-25 DIAGNOSIS — R25.1 TREMORS OF NERVOUS SYSTEM: Primary | ICD-10-CM

## 2025-07-25 PROCEDURE — 99213 OFFICE O/P EST LOW 20 MIN: CPT | Mod: S$PBB,,, | Performed by: NURSE PRACTITIONER

## 2025-07-25 PROCEDURE — 1126F AMNT PAIN NOTED NONE PRSNT: CPT | Mod: CPTII,,, | Performed by: NURSE PRACTITIONER

## 2025-07-25 PROCEDURE — 1159F MED LIST DOCD IN RCRD: CPT | Mod: CPTII,,, | Performed by: NURSE PRACTITIONER

## 2025-07-25 PROCEDURE — 99215 OFFICE O/P EST HI 40 MIN: CPT | Mod: PBBFAC | Performed by: NURSE PRACTITIONER

## 2025-07-25 PROCEDURE — 1101F PT FALLS ASSESS-DOCD LE1/YR: CPT | Mod: CPTII,,, | Performed by: NURSE PRACTITIONER

## 2025-07-25 PROCEDURE — 3075F SYST BP GE 130 - 139MM HG: CPT | Mod: CPTII,,, | Performed by: NURSE PRACTITIONER

## 2025-07-25 PROCEDURE — 3079F DIAST BP 80-89 MM HG: CPT | Mod: CPTII,,, | Performed by: NURSE PRACTITIONER

## 2025-07-25 PROCEDURE — 1160F RVW MEDS BY RX/DR IN RCRD: CPT | Mod: CPTII,,, | Performed by: NURSE PRACTITIONER

## 2025-07-25 PROCEDURE — 99999 PR PBB SHADOW E&M-EST. PATIENT-LVL V: CPT | Mod: PBBFAC,,, | Performed by: NURSE PRACTITIONER

## 2025-07-25 PROCEDURE — 3288F FALL RISK ASSESSMENT DOCD: CPT | Mod: CPTII,,, | Performed by: NURSE PRACTITIONER

## 2025-07-25 RX ORDER — METHOCARBAMOL 500 MG/1
500 TABLET, FILM COATED ORAL 2 TIMES DAILY PRN
Qty: 40 TABLET | Refills: 0 | Status: SHIPPED | OUTPATIENT
Start: 2025-07-25 | End: 2025-08-04

## 2025-07-25 RX ORDER — PRIMIDONE 50 MG/1
50 TABLET ORAL NIGHTLY
Qty: 30 TABLET | Refills: 11 | Status: SHIPPED | OUTPATIENT
Start: 2025-07-25 | End: 2026-07-25

## 2025-07-25 RX ORDER — PROPRANOLOL HYDROCHLORIDE 40 MG/1
40 TABLET ORAL DAILY
Qty: 30 TABLET | Refills: 11 | Status: SHIPPED | OUTPATIENT
Start: 2025-07-25 | End: 2026-07-25

## 2025-07-25 RX ORDER — ALBUTEROL SULFATE 90 UG/1
2 INHALANT RESPIRATORY (INHALATION)
COMMUNITY
Start: 2025-06-30

## 2025-07-25 RX ORDER — NEOMYCIN SULFATE, POLYMYXIN B SULFATE AND DEXAMETHASONE 3.5; 10000; 1 MG/ML; [USP'U]/ML; MG/ML
SUSPENSION/ DROPS OPHTHALMIC
COMMUNITY
Start: 2025-05-01

## 2025-07-25 NOTE — PATIENT INSTRUCTIONS
Continue the propranolol 40mg daily  Continue the mysoline 50mg at night  Reviewed prior MRI cervical  Recommend wearing wrist splint at night when sleeping

## 2025-09-02 ENCOUNTER — HOSPITAL ENCOUNTER (OUTPATIENT)
Dept: RADIOLOGY | Facility: HOSPITAL | Age: 80
Discharge: HOME | End: 2025-09-02
Attending: FAMILY MEDICINE
Payer: COMMERCIAL

## 2025-09-02 VITALS — BODY MASS INDEX: 30.32 KG/M2 | WEIGHT: 182 LBS | HEIGHT: 65 IN

## 2025-09-02 DIAGNOSIS — Z12.31 OTHER SCREENING MAMMOGRAM: ICD-10-CM

## 2025-09-02 PROCEDURE — 77067 SCR MAMMO BI INCL CAD: CPT | Mod: 26,,, | Performed by: RADIOLOGY

## 2025-09-02 PROCEDURE — 77063 BREAST TOMOSYNTHESIS BI: CPT | Mod: 26,,, | Performed by: RADIOLOGY

## 2025-09-02 PROCEDURE — 77067 SCR MAMMO BI INCL CAD: CPT | Mod: TC

## 2025-09-02 RX ORDER — METHOCARBAMOL 500 MG/1
TABLET, FILM COATED ORAL
Qty: 40 TABLET | Refills: 0 | Status: SHIPPED | OUTPATIENT
Start: 2025-09-02

## (undated) DEVICE — GLOVE SURGICAL PROTEXIS PI BLUE SIZE 7.0

## (undated) DEVICE — WRAP KNEE ACTIVE PO WITH 4 COLD PKS L/XL

## (undated) DEVICE — GLOVE SURGICAL PROTEXIS PI CLASSIC SIZE 8.0

## (undated) DEVICE — CUTTER TOMCAT 3.5X125MM

## (undated) DEVICE — APPLICATOR CHLORAPREP HI-LITE TINTED ORANGE 26ML

## (undated) DEVICE — WOUND DRAINAGE KIT MEDIUM 10

## (undated) DEVICE — TOURNIQUET CUFF DISP QC 34 INCH

## (undated) DEVICE — BUR CUTTER 3.5MM RESECTOR FORMULA

## (undated) DEVICE — BANDAGE ELASTIC FLEX-MASTER 6INX11YD STL DBL LNGTH

## (undated) DEVICE — TUBING ARTHRO STRYKER

## (undated) DEVICE — GLOVE SURGICAL PROTEXIS PI CLASSIC SIZE 7.0

## (undated) DEVICE — CDS KNEE ARTHROSCOPY

## (undated) DEVICE — SYRINGE 20CC LL PLASTIC  BX/48

## (undated) DEVICE — ESMARK BANDAGE 6INX3YD.

## (undated) DEVICE — SOL IRRIGATION SALINE 3000ML BAG

## (undated) DEVICE — CUTTER TOMCAT 4X125MM